# Patient Record
Sex: MALE | Race: OTHER | Employment: OTHER | URBAN - METROPOLITAN AREA
[De-identification: names, ages, dates, MRNs, and addresses within clinical notes are randomized per-mention and may not be internally consistent; named-entity substitution may affect disease eponyms.]

---

## 2021-08-05 ENCOUNTER — HOSPITAL ENCOUNTER (EMERGENCY)
Facility: HOSPITAL | Age: 66
Discharge: HOME | End: 2021-08-05
Attending: EMERGENCY MEDICINE
Payer: MEDICAID

## 2021-08-05 ENCOUNTER — APPOINTMENT (OUTPATIENT)
Dept: GENERAL RADIOLOGY | Facility: HOSPITAL | Age: 66
End: 2021-08-05
Attending: EMERGENCY MEDICINE
Payer: MEDICAID

## 2021-08-05 VITALS
DIASTOLIC BLOOD PRESSURE: 80 MMHG | RESPIRATION RATE: 18 BRPM | HEIGHT: 70 IN | HEART RATE: 67 BPM | OXYGEN SATURATION: 97 % | TEMPERATURE: 98 F | WEIGHT: 269 LBS | SYSTOLIC BLOOD PRESSURE: 143 MMHG | BODY MASS INDEX: 38.51 KG/M2

## 2021-08-05 DIAGNOSIS — R55 VASOVAGAL EPISODE: Primary | ICD-10-CM

## 2021-08-05 LAB
ALBUMIN SERPL-MCNC: 3.7 G/DL (ref 3.4–5)
ALBUMIN/GLOB SERPL: 0.8 {RATIO} (ref 1–2)
ALP LIVER SERPL-CCNC: 117 U/L
ALT SERPL-CCNC: 21 U/L
ANION GAP SERPL CALC-SCNC: 5 MMOL/L (ref 0–18)
AST SERPL-CCNC: 14 U/L (ref 15–37)
ATRIAL RATE: 73 BPM
BASOPHILS # BLD AUTO: 0.05 X10(3) UL (ref 0–0.2)
BASOPHILS NFR BLD AUTO: 0.7 %
BILIRUB SERPL-MCNC: 0.4 MG/DL (ref 0.1–2)
BUN BLD-MCNC: 19 MG/DL (ref 7–18)
CALCIUM BLD-MCNC: 9 MG/DL (ref 8.5–10.1)
CHLORIDE SERPL-SCNC: 104 MMOL/L (ref 98–112)
CO2 SERPL-SCNC: 29 MMOL/L (ref 21–32)
CREAT BLD-MCNC: 1.1 MG/DL
D-DIMER: 0.57 UG/ML FEU (ref ?–0.63)
EOSINOPHIL # BLD AUTO: 0.29 X10(3) UL (ref 0–0.7)
EOSINOPHIL NFR BLD AUTO: 4.2 %
ERYTHROCYTE [DISTWIDTH] IN BLOOD BY AUTOMATED COUNT: 11.9 %
GLOBULIN PLAS-MCNC: 4.4 G/DL (ref 2.8–4.4)
GLUCOSE BLD-MCNC: 161 MG/DL (ref 70–99)
HCT VFR BLD AUTO: 42 %
HGB BLD-MCNC: 13.6 G/DL
IMM GRANULOCYTES # BLD AUTO: 0.03 X10(3) UL (ref 0–1)
IMM GRANULOCYTES NFR BLD: 0.4 %
LYMPHOCYTES # BLD AUTO: 1.65 X10(3) UL (ref 1–4)
LYMPHOCYTES NFR BLD AUTO: 24 %
M PROTEIN MFR SERPL ELPH: 8.1 G/DL (ref 6.4–8.2)
MCH RBC QN AUTO: 26.7 PG (ref 26–34)
MCHC RBC AUTO-ENTMCNC: 32.4 G/DL (ref 31–37)
MCV RBC AUTO: 82.4 FL
MONOCYTES # BLD AUTO: 0.63 X10(3) UL (ref 0.1–1)
MONOCYTES NFR BLD AUTO: 9.2 %
NEUTROPHILS # BLD AUTO: 4.23 X10 (3) UL (ref 1.5–7.7)
NEUTROPHILS # BLD AUTO: 4.23 X10(3) UL (ref 1.5–7.7)
NEUTROPHILS NFR BLD AUTO: 61.5 %
NT-PROBNP SERPL-MCNC: 293 PG/ML (ref ?–125)
OSMOLALITY SERPL CALC.SUM OF ELEC: 292 MOSM/KG (ref 275–295)
PLATELET # BLD AUTO: 278 10(3)UL (ref 150–450)
POTASSIUM SERPL-SCNC: 4 MMOL/L (ref 3.5–5.1)
Q-T INTERVAL: 380 MS
QRS DURATION: 108 MS
QTC CALCULATION (BEZET): 454 MS
R AXIS: 13 DEGREES
RBC # BLD AUTO: 5.1 X10(6)UL
SARS-COV-2 RNA RESP QL NAA+PROBE: NOT DETECTED
SODIUM SERPL-SCNC: 138 MMOL/L (ref 136–145)
T AXIS: 44 DEGREES
TROPONIN I SERPL-MCNC: <0.045 NG/ML (ref ?–0.04)
VENTRICULAR RATE: 86 BPM
WBC # BLD AUTO: 6.9 X10(3) UL (ref 4–11)

## 2021-08-05 PROCEDURE — 85025 COMPLETE CBC W/AUTO DIFF WBC: CPT | Performed by: EMERGENCY MEDICINE

## 2021-08-05 PROCEDURE — 71045 X-RAY EXAM CHEST 1 VIEW: CPT | Performed by: EMERGENCY MEDICINE

## 2021-08-05 PROCEDURE — 93005 ELECTROCARDIOGRAM TRACING: CPT

## 2021-08-05 PROCEDURE — 84484 ASSAY OF TROPONIN QUANT: CPT | Performed by: EMERGENCY MEDICINE

## 2021-08-05 PROCEDURE — 83880 ASSAY OF NATRIURETIC PEPTIDE: CPT | Performed by: EMERGENCY MEDICINE

## 2021-08-05 PROCEDURE — 85379 FIBRIN DEGRADATION QUANT: CPT | Performed by: EMERGENCY MEDICINE

## 2021-08-05 PROCEDURE — 99284 EMERGENCY DEPT VISIT MOD MDM: CPT

## 2021-08-05 PROCEDURE — 80053 COMPREHEN METABOLIC PANEL: CPT | Performed by: EMERGENCY MEDICINE

## 2021-08-05 PROCEDURE — 99285 EMERGENCY DEPT VISIT HI MDM: CPT

## 2021-08-05 PROCEDURE — 93010 ELECTROCARDIOGRAM REPORT: CPT

## 2021-08-05 PROCEDURE — 36415 COLL VENOUS BLD VENIPUNCTURE: CPT

## 2021-08-05 RX ORDER — BICALUTAMIDE 50 MG/1
50 TABLET, FILM COATED ORAL 3 TIMES DAILY
COMMUNITY

## 2021-08-05 RX ORDER — CLOPIDOGREL BISULFATE 75 MG/1
75 TABLET ORAL DAILY
COMMUNITY

## 2021-08-05 NOTE — ED INITIAL ASSESSMENT (HPI)
Chills, nausea, diaphoretic today. Chest pain while in ambulance. Hx new onset afib dx 3 weeks ago, mitral stenosis, stage 4 prostate cancer dx 3 weeks ago.  Ileac stent placed in left leg 3 weeks ago

## 2021-08-05 NOTE — ED PROVIDER NOTES
Patient Seen in: [facility] Emergency Department      History   Patient presents with:  Chest Pain Angina    Stated Complaint: CP    HPI/Subjective:   HPI    74-year-old male brought in by family after an episode of sweating and chills today.   He wa 10\")   Wt 122 kg   SpO2 97%   BMI 38.60 kg/m²         Physical Exam    General:  Vitals as listed. No acute distress. Obese. HEENT: Sclerae anicteric. Conjunctivae show no pallor.   Oropharynx clear, mucous membranes moist   Neck: supple, no rigidity 10/09/2009, 8:38 AM.  INDICATIONS:  CP  PATIENT STATED HISTORY: (As transcribed by Technologist)   History of new onset afib diagnosed 3 weeks ago, mitral stenosis, stage 4 prostate cancer diagnosed 3 weeks ago.               CONCLUSION:  Mild cardiac enlar Disposition:  Discharge  8/5/2021  4:53 pm    Follow-up:  Natalie Andrews MD  [address] [place] [address]  [phone]    Schedule an appointment as soon as possible for a visit      See cardiology on Monday as scheduled

## 2023-01-01 ENCOUNTER — APPOINTMENT (OUTPATIENT)
Dept: GENERAL RADIOLOGY | Facility: HOSPITAL | Age: 68
End: 2023-01-01
Attending: INTERNAL MEDICINE
Payer: MEDICARE

## 2023-01-01 ENCOUNTER — APPOINTMENT (OUTPATIENT)
Dept: GENERAL RADIOLOGY | Facility: HOSPITAL | Age: 68
End: 2023-01-01
Attending: HOSPITALIST
Payer: MEDICARE

## 2023-01-01 ENCOUNTER — APPOINTMENT (OUTPATIENT)
Dept: CT IMAGING | Facility: HOSPITAL | Age: 68
End: 2023-01-01
Attending: INTERNAL MEDICINE
Payer: MEDICARE

## 2023-01-01 ENCOUNTER — APPOINTMENT (OUTPATIENT)
Dept: CT IMAGING | Facility: HOSPITAL | Age: 68
End: 2023-01-01
Attending: NURSE PRACTITIONER
Payer: MEDICARE

## 2023-01-01 ENCOUNTER — APPOINTMENT (OUTPATIENT)
Dept: INTERVENTIONAL RADIOLOGY/VASCULAR | Facility: HOSPITAL | Age: 68
End: 2023-01-01
Attending: INTERNAL MEDICINE
Payer: MEDICARE

## 2023-01-01 ENCOUNTER — HOSPITAL ENCOUNTER (INPATIENT)
Facility: HOSPITAL | Age: 68
LOS: 1 days | End: 2023-01-01
Attending: EMERGENCY MEDICINE | Admitting: HOSPITALIST
Payer: MEDICARE

## 2023-01-01 ENCOUNTER — APPOINTMENT (OUTPATIENT)
Dept: GENERAL RADIOLOGY | Facility: HOSPITAL | Age: 68
End: 2023-01-01
Attending: EMERGENCY MEDICINE
Payer: MEDICARE

## 2023-01-01 ENCOUNTER — APPOINTMENT (OUTPATIENT)
Dept: CT IMAGING | Facility: HOSPITAL | Age: 68
End: 2023-01-01
Attending: EMERGENCY MEDICINE
Payer: MEDICARE

## 2023-01-01 ENCOUNTER — HOSPITAL ENCOUNTER (INPATIENT)
Facility: HOSPITAL | Age: 68
LOS: 22 days | Discharge: NURSING, REHAB OR LONG-TERM CARE | End: 2023-01-01
Attending: EMERGENCY MEDICINE | Admitting: INTERNAL MEDICINE
Payer: MEDICARE

## 2023-01-01 ENCOUNTER — EXTERNAL FACILITY (OUTPATIENT)
Dept: FAMILY MEDICINE CLINIC | Facility: CLINIC | Age: 68
End: 2023-01-01

## 2023-01-01 VITALS
DIASTOLIC BLOOD PRESSURE: 44 MMHG | TEMPERATURE: 98 F | HEIGHT: 67 IN | WEIGHT: 206.81 LBS | BODY MASS INDEX: 32.46 KG/M2 | SYSTOLIC BLOOD PRESSURE: 79 MMHG

## 2023-01-01 VITALS
WEIGHT: 209.63 LBS | HEART RATE: 93 BPM | TEMPERATURE: 99 F | SYSTOLIC BLOOD PRESSURE: 105 MMHG | OXYGEN SATURATION: 100 % | RESPIRATION RATE: 24 BRPM | DIASTOLIC BLOOD PRESSURE: 60 MMHG | BODY MASS INDEX: 29 KG/M2

## 2023-01-01 DIAGNOSIS — C61 PROSTATE CARCINOMA (HCC): ICD-10-CM

## 2023-01-01 DIAGNOSIS — C79.9 METASTASIS FROM MALIGNANT TUMOR OF PROSTATE (HCC): Primary | ICD-10-CM

## 2023-01-01 DIAGNOSIS — C80.1 MENINGEAL CARCINOMATOSIS (HCC): Primary | ICD-10-CM

## 2023-01-01 DIAGNOSIS — D61.818 PANCYTOPENIA (HCC): ICD-10-CM

## 2023-01-01 DIAGNOSIS — D64.9 ANEMIA, UNSPECIFIED TYPE: ICD-10-CM

## 2023-01-01 DIAGNOSIS — C61 METASTASIS FROM MALIGNANT TUMOR OF PROSTATE (HCC): Primary | ICD-10-CM

## 2023-01-01 DIAGNOSIS — C79.49 MENINGEAL CARCINOMATOSIS (HCC): Primary | ICD-10-CM

## 2023-01-01 DIAGNOSIS — R06.02 SHORTNESS OF BREATH: ICD-10-CM

## 2023-01-01 DIAGNOSIS — R09.02 HYPOXIA: Primary | ICD-10-CM

## 2023-01-01 DIAGNOSIS — J69.0 ASPIRATION PNEUMONITIS (HCC): ICD-10-CM

## 2023-01-01 DIAGNOSIS — J98.11 ATELECTASIS: ICD-10-CM

## 2023-01-01 DIAGNOSIS — C79.31 MALIGNANT NEOPLASM METASTATIC TO BRAIN (HCC): ICD-10-CM

## 2023-01-01 LAB
ALBUMIN SERPL-MCNC: 2.1 G/DL (ref 3.4–5)
ALBUMIN SERPL-MCNC: 2.2 G/DL (ref 3.4–5)
ALBUMIN SERPL-MCNC: 2.2 G/DL (ref 3.4–5)
ALBUMIN SERPL-MCNC: 2.3 G/DL (ref 3.4–5)
ALBUMIN SERPL-MCNC: 2.5 G/DL (ref 3.4–5)
ALBUMIN/GLOB SERPL: 0.6 {RATIO} (ref 1–2)
ALBUMIN/GLOB SERPL: 0.7 {RATIO} (ref 1–2)
ALBUMIN/GLOB SERPL: 0.8 {RATIO} (ref 1–2)
ALP LIVER SERPL-CCNC: 331 U/L
ALP LIVER SERPL-CCNC: 378 U/L
ALP LIVER SERPL-CCNC: 405 U/L
ALP LIVER SERPL-CCNC: 413 U/L
ALP LIVER SERPL-CCNC: 475 U/L
ALT SERPL-CCNC: 25 U/L
ALT SERPL-CCNC: 33 U/L
ALT SERPL-CCNC: 35 U/L
ALT SERPL-CCNC: 36 U/L
ALT SERPL-CCNC: 39 U/L
ANION GAP SERPL CALC-SCNC: 2 MMOL/L (ref 0–18)
ANION GAP SERPL CALC-SCNC: 2 MMOL/L (ref 0–18)
ANION GAP SERPL CALC-SCNC: 4 MMOL/L (ref 0–18)
ANION GAP SERPL CALC-SCNC: 4 MMOL/L (ref 0–18)
ANION GAP SERPL CALC-SCNC: 5 MMOL/L (ref 0–18)
ANION GAP SERPL CALC-SCNC: 5 MMOL/L (ref 0–18)
ANION GAP SERPL CALC-SCNC: 6 MMOL/L (ref 0–18)
ANION GAP SERPL CALC-SCNC: 6 MMOL/L (ref 0–18)
ANION GAP SERPL CALC-SCNC: 8 MMOL/L (ref 0–18)
ANION GAP SERPL CALC-SCNC: 9 MMOL/L (ref 0–18)
ANION GAP SERPL CALC-SCNC: 9 MMOL/L (ref 0–18)
ANTIBODY SCREEN: NEGATIVE
ARTERIAL PATENCY WRIST A: POSITIVE
ARTERIAL PATENCY WRIST A: POSITIVE
AST SERPL-CCNC: 137 U/L (ref 15–37)
AST SERPL-CCNC: 40 U/L (ref 15–37)
AST SERPL-CCNC: 47 U/L (ref 15–37)
AST SERPL-CCNC: 56 U/L (ref 15–37)
AST SERPL-CCNC: 61 U/L (ref 15–37)
BASE EXCESS BLDA CALC-SCNC: 0.4 MMOL/L (ref ?–2)
BASE EXCESS BLDA CALC-SCNC: 1.1 MMOL/L (ref ?–2)
BASE EXCESS BLDA CALC-SCNC: 5 MMOL/L (ref ?–2)
BASE EXCESS BLDA CALC-SCNC: 7.4 MMOL/L (ref ?–2)
BASE EXCESS BLDV CALC-SCNC: 1.3 MMOL/L
BASOPHILS # BLD AUTO: 0.02 X10(3) UL (ref 0–0.2)
BASOPHILS # BLD: 0 X10(3) UL (ref 0–0.2)
BASOPHILS NFR BLD AUTO: 0.8 %
BASOPHILS NFR BLD AUTO: 1.1 %
BASOPHILS NFR BLD AUTO: 1.7 %
BASOPHILS NFR BLD: 0 %
BILIRUB SERPL-MCNC: 0.4 MG/DL (ref 0.1–2)
BILIRUB SERPL-MCNC: 0.5 MG/DL (ref 0.1–2)
BILIRUB SERPL-MCNC: 1 MG/DL (ref 0.1–2)
BILIRUB UR QL STRIP.AUTO: NEGATIVE
BLOOD TYPE BARCODE: 5100
BODY TEMPERATURE: 97.4 F
BODY TEMPERATURE: 98.6 F
BUN BLD-MCNC: 11 MG/DL (ref 7–18)
BUN BLD-MCNC: 12 MG/DL (ref 7–18)
BUN BLD-MCNC: 15 MG/DL (ref 7–18)
BUN BLD-MCNC: 17 MG/DL (ref 7–18)
BUN BLD-MCNC: 18 MG/DL (ref 7–18)
BUN BLD-MCNC: 19 MG/DL (ref 7–18)
BUN BLD-MCNC: 19 MG/DL (ref 7–18)
BUN BLD-MCNC: 20 MG/DL (ref 7–18)
BUN BLD-MCNC: 9 MG/DL (ref 7–18)
C DIFF TOX B STL QL: NEGATIVE
CA-I BLD-SCNC: 1.14 MMOL/L (ref 0.95–1.32)
CA-I BLD-SCNC: 1.19 MMOL/L (ref 0.95–1.32)
CA-I BLD-SCNC: 1.2 MMOL/L (ref 0.95–1.32)
CA-I BLD-SCNC: 1.24 MMOL/L (ref 0.95–1.32)
CALCIUM BLD-MCNC: 8.1 MG/DL (ref 8.5–10.1)
CALCIUM BLD-MCNC: 8.2 MG/DL (ref 8.5–10.1)
CALCIUM BLD-MCNC: 8.2 MG/DL (ref 8.5–10.1)
CALCIUM BLD-MCNC: 8.3 MG/DL (ref 8.5–10.1)
CALCIUM BLD-MCNC: 8.4 MG/DL (ref 8.5–10.1)
CALCIUM BLD-MCNC: 8.4 MG/DL (ref 8.5–10.1)
CALCIUM BLD-MCNC: 8.6 MG/DL (ref 8.5–10.1)
CALCIUM BLD-MCNC: 8.9 MG/DL (ref 8.5–10.1)
CHLORIDE SERPL-SCNC: 104 MMOL/L (ref 98–112)
CHLORIDE SERPL-SCNC: 107 MMOL/L (ref 98–112)
CHLORIDE SERPL-SCNC: 109 MMOL/L (ref 98–112)
CHLORIDE SERPL-SCNC: 113 MMOL/L (ref 98–112)
CHLORIDE SERPL-SCNC: 115 MMOL/L (ref 98–112)
CHLORIDE SERPL-SCNC: 116 MMOL/L (ref 98–112)
CHLORIDE SERPL-SCNC: 117 MMOL/L (ref 98–112)
CHLORIDE SERPL-SCNC: 99 MMOL/L (ref 98–112)
CHLORIDE UR-SCNC: 13 MMOL/L
CLARITY UR REFRACT.AUTO: CLEAR
CO2 SERPL-SCNC: 22 MMOL/L (ref 21–32)
CO2 SERPL-SCNC: 24 MMOL/L (ref 21–32)
CO2 SERPL-SCNC: 24 MMOL/L (ref 21–32)
CO2 SERPL-SCNC: 27 MMOL/L (ref 21–32)
CO2 SERPL-SCNC: 28 MMOL/L (ref 21–32)
CO2 SERPL-SCNC: 31 MMOL/L (ref 21–32)
COHGB MFR BLD: 1.4 % SAT (ref 0–3)
COHGB MFR BLD: 1.5 % SAT (ref 0–3)
COHGB MFR BLD: 1.6 % SAT (ref 0–3)
COHGB MFR BLD: 1.8 % SAT (ref 0–3)
COLOR UR AUTO: YELLOW
CREAT BLD-MCNC: 0.42 MG/DL
CREAT BLD-MCNC: 0.52 MG/DL
CREAT BLD-MCNC: 0.62 MG/DL
CREAT BLD-MCNC: 0.64 MG/DL
CREAT BLD-MCNC: 0.65 MG/DL
CREAT BLD-MCNC: 0.66 MG/DL
CREAT BLD-MCNC: 0.67 MG/DL
CREAT BLD-MCNC: 0.68 MG/DL
CREAT BLD-MCNC: 0.68 MG/DL
CREAT BLD-MCNC: 0.76 MG/DL
CREAT BLD-MCNC: 0.83 MG/DL
EGFRCR SERPLBLD CKD-EPI 2021: 100 ML/MIN/1.73M2 (ref 60–?)
EGFRCR SERPLBLD CKD-EPI 2021: 103 ML/MIN/1.73M2 (ref 60–?)
EGFRCR SERPLBLD CKD-EPI 2021: 103 ML/MIN/1.73M2 (ref 60–?)
EGFRCR SERPLBLD CKD-EPI 2021: 104 ML/MIN/1.73M2 (ref 60–?)
EGFRCR SERPLBLD CKD-EPI 2021: 105 ML/MIN/1.73M2 (ref 60–?)
EGFRCR SERPLBLD CKD-EPI 2021: 105 ML/MIN/1.73M2 (ref 60–?)
EGFRCR SERPLBLD CKD-EPI 2021: 106 ML/MIN/1.73M2 (ref 60–?)
EGFRCR SERPLBLD CKD-EPI 2021: 119 ML/MIN/1.73M2 (ref 60–?)
EGFRCR SERPLBLD CKD-EPI 2021: 97 ML/MIN/1.73M2 (ref 60–?)
EOSINOPHIL # BLD AUTO: 0.01 X10(3) UL (ref 0–0.7)
EOSINOPHIL # BLD AUTO: 0.01 X10(3) UL (ref 0–0.7)
EOSINOPHIL # BLD AUTO: 0.04 X10(3) UL (ref 0–0.7)
EOSINOPHIL # BLD: 0 X10(3) UL (ref 0–0.7)
EOSINOPHIL # BLD: 0.03 X10(3) UL (ref 0–0.7)
EOSINOPHIL # BLD: 0.04 X10(3) UL (ref 0–0.7)
EOSINOPHIL NFR BLD AUTO: 0.5 %
EOSINOPHIL NFR BLD AUTO: 0.8 %
EOSINOPHIL NFR BLD AUTO: 1.7 %
EOSINOPHIL NFR BLD: 0 %
EOSINOPHIL NFR BLD: 1 %
EOSINOPHIL NFR BLD: 1 %
ERYTHROCYTE [DISTWIDTH] IN BLOOD BY AUTOMATED COUNT: 15.6 %
ERYTHROCYTE [DISTWIDTH] IN BLOOD BY AUTOMATED COUNT: 15.9 %
ERYTHROCYTE [DISTWIDTH] IN BLOOD BY AUTOMATED COUNT: 16.1 %
ERYTHROCYTE [DISTWIDTH] IN BLOOD BY AUTOMATED COUNT: 16.2 %
ERYTHROCYTE [DISTWIDTH] IN BLOOD BY AUTOMATED COUNT: 16.3 %
ERYTHROCYTE [DISTWIDTH] IN BLOOD BY AUTOMATED COUNT: 16.4 %
ERYTHROCYTE [DISTWIDTH] IN BLOOD BY AUTOMATED COUNT: 16.5 %
ERYTHROCYTE [DISTWIDTH] IN BLOOD BY AUTOMATED COUNT: 16.6 %
ERYTHROCYTE [DISTWIDTH] IN BLOOD BY AUTOMATED COUNT: 16.7 %
ERYTHROCYTE [DISTWIDTH] IN BLOOD BY AUTOMATED COUNT: 16.8 %
EXPIRATORY PRESSURE: 5 CM H2O
FIO2: 100 %
FLUAV + FLUBV RNA SPEC NAA+PROBE: NEGATIVE
FLUAV + FLUBV RNA SPEC NAA+PROBE: NEGATIVE
GFR SERPLBLD BASED ON 1.73 SQ M-ARVRAT: 104 ML/MIN/1.73M2 (ref 60–?)
GFR SERPLBLD BASED ON 1.73 SQ M-ARVRAT: 112 ML/MIN/1.73M2 (ref 60–?)
GLOBULIN PLAS-MCNC: 3 G/DL (ref 2.8–4.4)
GLOBULIN PLAS-MCNC: 3 G/DL (ref 2.8–4.4)
GLOBULIN PLAS-MCNC: 3.1 G/DL (ref 2.8–4.4)
GLOBULIN PLAS-MCNC: 3.2 G/DL (ref 2.8–4.4)
GLOBULIN PLAS-MCNC: 4.2 G/DL (ref 2.8–4.4)
GLUCOSE BLD-MCNC: 102 MG/DL (ref 70–99)
GLUCOSE BLD-MCNC: 105 MG/DL (ref 70–99)
GLUCOSE BLD-MCNC: 105 MG/DL (ref 70–99)
GLUCOSE BLD-MCNC: 107 MG/DL (ref 70–99)
GLUCOSE BLD-MCNC: 108 MG/DL (ref 70–99)
GLUCOSE BLD-MCNC: 116 MG/DL (ref 70–99)
GLUCOSE BLD-MCNC: 116 MG/DL (ref 70–99)
GLUCOSE BLD-MCNC: 117 MG/DL (ref 70–99)
GLUCOSE BLD-MCNC: 119 MG/DL (ref 70–99)
GLUCOSE BLD-MCNC: 120 MG/DL (ref 70–99)
GLUCOSE BLD-MCNC: 121 MG/DL (ref 70–99)
GLUCOSE BLD-MCNC: 123 MG/DL (ref 70–99)
GLUCOSE BLD-MCNC: 124 MG/DL (ref 70–99)
GLUCOSE BLD-MCNC: 124 MG/DL (ref 70–99)
GLUCOSE BLD-MCNC: 125 MG/DL (ref 70–99)
GLUCOSE BLD-MCNC: 128 MG/DL (ref 70–99)
GLUCOSE BLD-MCNC: 128 MG/DL (ref 70–99)
GLUCOSE BLD-MCNC: 129 MG/DL (ref 70–99)
GLUCOSE BLD-MCNC: 129 MG/DL (ref 70–99)
GLUCOSE BLD-MCNC: 130 MG/DL (ref 70–99)
GLUCOSE BLD-MCNC: 131 MG/DL (ref 70–99)
GLUCOSE BLD-MCNC: 135 MG/DL (ref 70–99)
GLUCOSE BLD-MCNC: 136 MG/DL (ref 70–99)
GLUCOSE BLD-MCNC: 136 MG/DL (ref 70–99)
GLUCOSE BLD-MCNC: 137 MG/DL (ref 70–99)
GLUCOSE BLD-MCNC: 138 MG/DL (ref 70–99)
GLUCOSE BLD-MCNC: 139 MG/DL (ref 70–99)
GLUCOSE BLD-MCNC: 140 MG/DL (ref 70–99)
GLUCOSE BLD-MCNC: 140 MG/DL (ref 70–99)
GLUCOSE BLD-MCNC: 141 MG/DL (ref 70–99)
GLUCOSE BLD-MCNC: 141 MG/DL (ref 70–99)
GLUCOSE BLD-MCNC: 142 MG/DL (ref 70–99)
GLUCOSE BLD-MCNC: 145 MG/DL (ref 70–99)
GLUCOSE BLD-MCNC: 146 MG/DL (ref 70–99)
GLUCOSE BLD-MCNC: 147 MG/DL (ref 70–99)
GLUCOSE BLD-MCNC: 147 MG/DL (ref 70–99)
GLUCOSE BLD-MCNC: 154 MG/DL (ref 70–99)
GLUCOSE BLD-MCNC: 157 MG/DL (ref 70–99)
GLUCOSE BLD-MCNC: 158 MG/DL (ref 70–99)
GLUCOSE BLD-MCNC: 159 MG/DL (ref 70–99)
GLUCOSE BLD-MCNC: 162 MG/DL (ref 70–99)
GLUCOSE BLD-MCNC: 166 MG/DL (ref 70–99)
GLUCOSE BLD-MCNC: 168 MG/DL (ref 70–99)
GLUCOSE BLD-MCNC: 170 MG/DL (ref 70–99)
GLUCOSE BLD-MCNC: 172 MG/DL (ref 70–99)
GLUCOSE BLD-MCNC: 179 MG/DL (ref 70–99)
GLUCOSE BLD-MCNC: 182 MG/DL (ref 70–99)
GLUCOSE BLD-MCNC: 185 MG/DL (ref 70–99)
GLUCOSE BLD-MCNC: 189 MG/DL (ref 70–99)
GLUCOSE BLD-MCNC: 189 MG/DL (ref 70–99)
GLUCOSE BLD-MCNC: 190 MG/DL (ref 70–99)
GLUCOSE BLD-MCNC: 193 MG/DL (ref 70–99)
GLUCOSE BLD-MCNC: 195 MG/DL (ref 70–99)
GLUCOSE BLD-MCNC: 196 MG/DL (ref 70–99)
GLUCOSE BLD-MCNC: 197 MG/DL (ref 70–99)
GLUCOSE BLD-MCNC: 197 MG/DL (ref 70–99)
GLUCOSE BLD-MCNC: 199 MG/DL (ref 70–99)
GLUCOSE BLD-MCNC: 204 MG/DL (ref 70–99)
GLUCOSE BLD-MCNC: 205 MG/DL (ref 70–99)
GLUCOSE BLD-MCNC: 205 MG/DL (ref 70–99)
GLUCOSE BLD-MCNC: 206 MG/DL (ref 70–99)
GLUCOSE BLD-MCNC: 210 MG/DL (ref 70–99)
GLUCOSE BLD-MCNC: 212 MG/DL (ref 70–99)
GLUCOSE BLD-MCNC: 216 MG/DL (ref 70–99)
GLUCOSE BLD-MCNC: 217 MG/DL (ref 70–99)
GLUCOSE BLD-MCNC: 221 MG/DL (ref 70–99)
GLUCOSE BLD-MCNC: 223 MG/DL (ref 70–99)
GLUCOSE BLD-MCNC: 224 MG/DL (ref 70–99)
GLUCOSE BLD-MCNC: 225 MG/DL (ref 70–99)
GLUCOSE BLD-MCNC: 226 MG/DL (ref 70–99)
GLUCOSE BLD-MCNC: 230 MG/DL (ref 70–99)
GLUCOSE BLD-MCNC: 233 MG/DL (ref 70–99)
GLUCOSE BLD-MCNC: 235 MG/DL (ref 70–99)
GLUCOSE BLD-MCNC: 236 MG/DL (ref 70–99)
GLUCOSE BLD-MCNC: 238 MG/DL (ref 70–99)
GLUCOSE BLD-MCNC: 239 MG/DL (ref 70–99)
GLUCOSE BLD-MCNC: 239 MG/DL (ref 70–99)
GLUCOSE BLD-MCNC: 241 MG/DL (ref 70–99)
GLUCOSE BLD-MCNC: 241 MG/DL (ref 70–99)
GLUCOSE BLD-MCNC: 243 MG/DL (ref 70–99)
GLUCOSE BLD-MCNC: 245 MG/DL (ref 70–99)
GLUCOSE BLD-MCNC: 246 MG/DL (ref 70–99)
GLUCOSE BLD-MCNC: 248 MG/DL (ref 70–99)
GLUCOSE BLD-MCNC: 249 MG/DL (ref 70–99)
GLUCOSE BLD-MCNC: 253 MG/DL (ref 70–99)
GLUCOSE BLD-MCNC: 255 MG/DL (ref 70–99)
GLUCOSE BLD-MCNC: 261 MG/DL (ref 70–99)
GLUCOSE BLD-MCNC: 266 MG/DL (ref 70–99)
GLUCOSE BLD-MCNC: 270 MG/DL (ref 70–99)
GLUCOSE BLD-MCNC: 282 MG/DL (ref 70–99)
GLUCOSE BLD-MCNC: 285 MG/DL (ref 70–99)
GLUCOSE BLD-MCNC: 286 MG/DL (ref 70–99)
GLUCOSE BLD-MCNC: 318 MG/DL (ref 70–99)
GLUCOSE UR STRIP.AUTO-MCNC: NEGATIVE MG/DL
HCO3 BLDA-SCNC: 25.3 MEQ/L (ref 21–27)
HCO3 BLDA-SCNC: 25.7 MEQ/L (ref 21–27)
HCO3 BLDA-SCNC: 28.8 MEQ/L (ref 21–27)
HCO3 BLDA-SCNC: 30.6 MEQ/L (ref 21–27)
HCO3 BLDV-SCNC: 24.6 MEQ/L (ref 22–26)
HCT VFR BLD AUTO: 18.6 %
HCT VFR BLD AUTO: 20.4 %
HCT VFR BLD AUTO: 22.4 %
HCT VFR BLD AUTO: 22.6 %
HCT VFR BLD AUTO: 22.6 %
HCT VFR BLD AUTO: 23.3 %
HCT VFR BLD AUTO: 23.4 %
HCT VFR BLD AUTO: 23.5 %
HCT VFR BLD AUTO: 23.8 %
HCT VFR BLD AUTO: 24.8 %
HCT VFR BLD AUTO: 25.2 %
HCT VFR BLD AUTO: 25.9 %
HCT VFR BLD AUTO: 27.1 %
HCT VFR BLD AUTO: 28.1 %
HCT VFR BLD AUTO: 29.3 %
HCT VFR BLD AUTO: 29.6 %
HGB BLD-MCNC: 5.8 G/DL
HGB BLD-MCNC: 6.2 G/DL
HGB BLD-MCNC: 6.6 G/DL
HGB BLD-MCNC: 7 G/DL
HGB BLD-MCNC: 7.1 G/DL
HGB BLD-MCNC: 7.2 G/DL
HGB BLD-MCNC: 7.3 G/DL
HGB BLD-MCNC: 7.3 G/DL
HGB BLD-MCNC: 7.4 G/DL
HGB BLD-MCNC: 7.5 G/DL
HGB BLD-MCNC: 7.6 G/DL
HGB BLD-MCNC: 7.7 G/DL
HGB BLD-MCNC: 7.8 G/DL
HGB BLD-MCNC: 7.8 G/DL
HGB BLD-MCNC: 8 G/DL
HGB BLD-MCNC: 8.1 G/DL
HGB BLD-MCNC: 8.4 G/DL
HGB BLD-MCNC: 9 G/DL
HGB BLD-MCNC: 9.7 G/DL
IMM GRANULOCYTES # BLD AUTO: 0.05 X10(3) UL (ref 0–1)
IMM GRANULOCYTES # BLD AUTO: 0.05 X10(3) UL (ref 0–1)
IMM GRANULOCYTES # BLD AUTO: 0.09 X10(3) UL (ref 0–1)
IMM GRANULOCYTES NFR BLD: 2.7 %
IMM GRANULOCYTES NFR BLD: 3.7 %
IMM GRANULOCYTES NFR BLD: 4.2 %
INR BLD: 1.13 (ref 0.85–1.16)
INSP PRESSURE: 12 CM H2O
KETONES UR STRIP.AUTO-MCNC: NEGATIVE MG/DL
L/M: 4 L/MIN
L/M: 6 L/MIN
LACTATE BLD-SCNC: 1.5 MMOL/L (ref 0.5–2)
LACTATE BLD-SCNC: 1.6 MMOL/L (ref 0.5–2)
LACTATE BLD-SCNC: 2.6 MMOL/L (ref 0.5–2)
LACTATE BLD-SCNC: 2.7 MMOL/L (ref 0.5–2)
LACTATE SERPL-SCNC: 1.2 MMOL/L (ref 0.4–2)
LACTATE SERPL-SCNC: 2 MMOL/L (ref 0.4–2)
LACTATE SERPL-SCNC: 2 MMOL/L (ref 0.4–2)
LACTATE SERPL-SCNC: 2.6 MMOL/L (ref 0.4–2)
LACTATE SERPL-SCNC: 2.7 MMOL/L (ref 0.4–2)
LACTATE SERPL-SCNC: 3.6 MMOL/L (ref 0.4–2)
LACTATE SERPL-SCNC: 3.8 MMOL/L (ref 0.4–2)
LDH SERPL L TO P-CCNC: 1574 U/L
LDH SERPL L TO P-CCNC: >4000 U/L
LEUKOCYTE ESTERASE UR QL STRIP.AUTO: NEGATIVE
LIPASE SERPL-CCNC: 23 U/L (ref 13–75)
LYMPHOCYTES # BLD AUTO: 0.31 X10(3) UL (ref 1–4)
LYMPHOCYTES # BLD AUTO: 0.59 X10(3) UL (ref 1–4)
LYMPHOCYTES # BLD AUTO: 0.8 X10(3) UL (ref 1–4)
LYMPHOCYTES NFR BLD AUTO: 26.3 %
LYMPHOCYTES NFR BLD AUTO: 31.9 %
LYMPHOCYTES NFR BLD AUTO: 33.1 %
LYMPHOCYTES NFR BLD: 0.26 X10(3) UL (ref 1–4)
LYMPHOCYTES NFR BLD: 0.27 X10(3) UL (ref 1–4)
LYMPHOCYTES NFR BLD: 0.28 X10(3) UL (ref 1–4)
LYMPHOCYTES NFR BLD: 0.45 X10(3) UL (ref 1–4)
LYMPHOCYTES NFR BLD: 0.48 X10(3) UL (ref 1–4)
LYMPHOCYTES NFR BLD: 0.54 X10(3) UL (ref 1–4)
LYMPHOCYTES NFR BLD: 0.6 X10(3) UL (ref 1–4)
LYMPHOCYTES NFR BLD: 14 %
LYMPHOCYTES NFR BLD: 14 %
LYMPHOCYTES NFR BLD: 15 %
LYMPHOCYTES NFR BLD: 15 %
LYMPHOCYTES NFR BLD: 16 %
LYMPHOCYTES NFR BLD: 17 %
LYMPHOCYTES NFR BLD: 20 %
MAGNESIUM SERPL-MCNC: 1.9 MG/DL (ref 1.6–2.6)
MAGNESIUM SERPL-MCNC: 2 MG/DL (ref 1.6–2.6)
MAGNESIUM SERPL-MCNC: 2.3 MG/DL (ref 1.6–2.6)
MAGNESIUM SERPL-MCNC: 2.4 MG/DL (ref 1.6–2.6)
MCH RBC QN AUTO: 25.5 PG (ref 26–34)
MCH RBC QN AUTO: 25.6 PG (ref 26–34)
MCH RBC QN AUTO: 25.8 PG (ref 26–34)
MCH RBC QN AUTO: 26 PG (ref 26–34)
MCH RBC QN AUTO: 26 PG (ref 26–34)
MCH RBC QN AUTO: 26.1 PG (ref 26–34)
MCH RBC QN AUTO: 26.1 PG (ref 26–34)
MCH RBC QN AUTO: 26.5 PG (ref 26–34)
MCH RBC QN AUTO: 26.7 PG (ref 26–34)
MCH RBC QN AUTO: 26.8 PG (ref 26–34)
MCH RBC QN AUTO: 26.9 PG (ref 26–34)
MCH RBC QN AUTO: 27.1 PG (ref 26–34)
MCH RBC QN AUTO: 27.2 PG (ref 26–34)
MCH RBC QN AUTO: 27.3 PG (ref 26–34)
MCH RBC QN AUTO: 27.4 PG (ref 26–34)
MCH RBC QN AUTO: 27.5 PG (ref 26–34)
MCHC RBC AUTO-ENTMCNC: 28.2 G/DL (ref 31–37)
MCHC RBC AUTO-ENTMCNC: 28.4 G/DL (ref 31–37)
MCHC RBC AUTO-ENTMCNC: 28.8 G/DL (ref 31–37)
MCHC RBC AUTO-ENTMCNC: 29.5 G/DL (ref 31–37)
MCHC RBC AUTO-ENTMCNC: 29.8 G/DL (ref 31–37)
MCHC RBC AUTO-ENTMCNC: 30.1 G/DL (ref 31–37)
MCHC RBC AUTO-ENTMCNC: 30.4 G/DL (ref 31–37)
MCHC RBC AUTO-ENTMCNC: 30.5 G/DL (ref 31–37)
MCHC RBC AUTO-ENTMCNC: 30.7 G/DL (ref 31–37)
MCHC RBC AUTO-ENTMCNC: 31.1 G/DL (ref 31–37)
MCHC RBC AUTO-ENTMCNC: 31.1 G/DL (ref 31–37)
MCHC RBC AUTO-ENTMCNC: 31.2 G/DL (ref 31–37)
MCHC RBC AUTO-ENTMCNC: 31.3 G/DL (ref 31–37)
MCHC RBC AUTO-ENTMCNC: 31.4 G/DL (ref 31–37)
MCHC RBC AUTO-ENTMCNC: 31.5 G/DL (ref 31–37)
MCHC RBC AUTO-ENTMCNC: 31.9 G/DL (ref 31–37)
MCV RBC AUTO: 83.6 FL
MCV RBC AUTO: 84 FL
MCV RBC AUTO: 85.3 FL
MCV RBC AUTO: 85.3 FL
MCV RBC AUTO: 85.7 FL
MCV RBC AUTO: 86.1 FL
MCV RBC AUTO: 87.2 FL
MCV RBC AUTO: 87.3 FL
MCV RBC AUTO: 87.8 FL
MCV RBC AUTO: 87.8 FL
MCV RBC AUTO: 88.3 FL
MCV RBC AUTO: 88.9 FL
MCV RBC AUTO: 89.3 FL
MCV RBC AUTO: 90 FL
MCV RBC AUTO: 90.3 FL
MCV RBC AUTO: 91.9 FL
METAMYELOCYTES # BLD: 0.02 X10(3) UL
METAMYELOCYTES # BLD: 0.03 X10(3) UL
METAMYELOCYTES # BLD: 0.07 X10(3) UL
METAMYELOCYTES # BLD: 0.08 X10(3) UL
METAMYELOCYTES NFR BLD: 1 %
METAMYELOCYTES NFR BLD: 1 %
METAMYELOCYTES NFR BLD: 2 %
METAMYELOCYTES NFR BLD: 2 %
METHGB MFR BLD: 0 % SAT (ref 0.4–1.5)
METHGB MFR BLD: 0.4 % SAT (ref 0.4–1.5)
METHGB MFR BLD: 0.7 % SAT (ref 0.4–1.5)
METHGB MFR BLD: 0.7 % SAT (ref 0.4–1.5)
MONOCYTES # BLD AUTO: 0.12 X10(3) UL (ref 0.1–1)
MONOCYTES # BLD AUTO: 0.14 X10(3) UL (ref 0.1–1)
MONOCYTES # BLD AUTO: 0.14 X10(3) UL (ref 0.1–1)
MONOCYTES # BLD: 0.03 X10(3) UL (ref 0.1–1)
MONOCYTES # BLD: 0.08 X10(3) UL (ref 0.1–1)
MONOCYTES # BLD: 0.1 X10(3) UL (ref 0.1–1)
MONOCYTES # BLD: 0.13 X10(3) UL (ref 0.1–1)
MONOCYTES # BLD: 0.2 X10(3) UL (ref 0.1–1)
MONOCYTES # BLD: 0.28 X10(3) UL (ref 0.1–1)
MONOCYTES # BLD: 0.32 X10(3) UL (ref 0.1–1)
MONOCYTES NFR BLD AUTO: 11.9 %
MONOCYTES NFR BLD AUTO: 5.8 %
MONOCYTES NFR BLD AUTO: 6.5 %
MONOCYTES NFR BLD: 1 %
MONOCYTES NFR BLD: 20 %
MONOCYTES NFR BLD: 3 %
MONOCYTES NFR BLD: 5 %
MONOCYTES NFR BLD: 5 %
MONOCYTES NFR BLD: 8 %
MONOCYTES NFR BLD: 9 %
MORPHOLOGY: NORMAL
MORPHOLOGY: NORMAL
MYELOCYTES # BLD: 0.08 X10(3) UL
MYELOCYTES NFR BLD: 2 %
NEUTROPHILS # BLD AUTO: 0.65 X10 (3) UL (ref 1.5–7.7)
NEUTROPHILS # BLD AUTO: 0.65 X10(3) UL (ref 1.5–7.7)
NEUTROPHILS # BLD AUTO: 0.86 X10 (3) UL (ref 1.5–7.7)
NEUTROPHILS # BLD AUTO: 1.06 X10 (3) UL (ref 1.5–7.7)
NEUTROPHILS # BLD AUTO: 1.06 X10 (3) UL (ref 1.5–7.7)
NEUTROPHILS # BLD AUTO: 1.06 X10(3) UL (ref 1.5–7.7)
NEUTROPHILS # BLD AUTO: 1.1 X10 (3) UL (ref 1.5–7.7)
NEUTROPHILS # BLD AUTO: 1.33 X10 (3) UL (ref 1.5–7.7)
NEUTROPHILS # BLD AUTO: 1.33 X10(3) UL (ref 1.5–7.7)
NEUTROPHILS # BLD AUTO: 1.63 X10 (3) UL (ref 1.5–7.7)
NEUTROPHILS # BLD AUTO: 2.04 X10 (3) UL (ref 1.5–7.7)
NEUTROPHILS # BLD AUTO: 2.25 X10 (3) UL (ref 1.5–7.7)
NEUTROPHILS # BLD AUTO: 2.76 X10 (3) UL (ref 1.5–7.7)
NEUTROPHILS NFR BLD AUTO: 54.9 %
NEUTROPHILS NFR BLD AUTO: 55.1 %
NEUTROPHILS NFR BLD AUTO: 57.3 %
NEUTROPHILS NFR BLD: 60 %
NEUTROPHILS NFR BLD: 65 %
NEUTROPHILS NFR BLD: 69 %
NEUTROPHILS NFR BLD: 73 %
NEUTROPHILS NFR BLD: 75 %
NEUTROPHILS NFR BLD: 76 %
NEUTROPHILS NFR BLD: 79 %
NEUTS BAND NFR BLD: 1 %
NEUTS BAND NFR BLD: 10 %
NEUTS BAND NFR BLD: 5 %
NEUTS BAND NFR BLD: 6 %
NEUTS BAND NFR BLD: 9 %
NEUTS HYPERSEG # BLD: 0.84 X10(3) UL (ref 1.5–7.7)
NEUTS HYPERSEG # BLD: 1.22 X10(3) UL (ref 1.5–7.7)
NEUTS HYPERSEG # BLD: 1.52 X10(3) UL (ref 1.5–7.7)
NEUTS HYPERSEG # BLD: 2.18 X10(3) UL (ref 1.5–7.7)
NEUTS HYPERSEG # BLD: 2.66 X10(3) UL (ref 1.5–7.7)
NEUTS HYPERSEG # BLD: 2.72 X10(3) UL (ref 1.5–7.7)
NEUTS HYPERSEG # BLD: 3 X10(3) UL (ref 1.5–7.7)
NITRITE UR QL STRIP.AUTO: NEGATIVE
NRBC BLD MANUAL-RTO: 1 %
NRBC BLD MANUAL-RTO: 10 %
NRBC BLD MANUAL-RTO: 3 %
NRBC BLD MANUAL-RTO: 4 %
NRBC BLD MANUAL-RTO: 5 %
NT-PROBNP SERPL-MCNC: 2267 PG/ML (ref ?–125)
OSMOLALITY SERPL CALC.SUM OF ELEC: 274 MOSM/KG (ref 275–295)
OSMOLALITY SERPL CALC.SUM OF ELEC: 280 MOSM/KG (ref 275–295)
OSMOLALITY SERPL CALC.SUM OF ELEC: 293 MOSM/KG (ref 275–295)
OSMOLALITY SERPL CALC.SUM OF ELEC: 300 MOSM/KG (ref 275–295)
OSMOLALITY SERPL CALC.SUM OF ELEC: 309 MOSM/KG (ref 275–295)
OSMOLALITY SERPL CALC.SUM OF ELEC: 311 MOSM/KG (ref 275–295)
OSMOLALITY SERPL CALC.SUM OF ELEC: 312 MOSM/KG (ref 275–295)
OSMOLALITY SERPL CALC.SUM OF ELEC: 313 MOSM/KG (ref 275–295)
OSMOLALITY SERPL CALC.SUM OF ELEC: 314 MOSM/KG (ref 275–295)
OSMOLALITY SERPL CALC.SUM OF ELEC: 320 MOSM/KG (ref 275–295)
OSMOLALITY SERPL CALC.SUM OF ELEC: 323 MOSM/KG (ref 275–295)
OXYHGB MFR BLDA: 90.7 % (ref 92–100)
OXYHGB MFR BLDA: 92.4 % (ref 92–100)
OXYHGB MFR BLDA: 96.1 % (ref 92–100)
OXYHGB MFR BLDA: 98.2 % (ref 92–100)
OXYHGB MFR BLDV: 47 % (ref 72–78)
PCO2 BLDA: 33 MM HG (ref 35–45)
PCO2 BLDA: 40 MM HG (ref 35–45)
PCO2 BLDA: 40 MM HG (ref 35–45)
PCO2 BLDA: 55 MM HG (ref 35–45)
PCO2 BLDV: 40 MM HG (ref 38–50)
PEEP: 5 CM H2O
PH BLDA: 7.3 [PH] (ref 7.35–7.45)
PH BLDA: 7.47 [PH] (ref 7.35–7.45)
PH BLDA: 7.48 [PH] (ref 7.35–7.45)
PH BLDA: 7.5 [PH] (ref 7.35–7.45)
PH BLDV: 7.42 [PH] (ref 7.33–7.43)
PH UR STRIP.AUTO: 5 [PH] (ref 5–8)
PHOSPHATE SERPL-MCNC: 1.7 MG/DL (ref 2.5–4.9)
PHOSPHATE SERPL-MCNC: 2.4 MG/DL (ref 2.5–4.9)
PHOSPHATE SERPL-MCNC: 2.4 MG/DL (ref 2.5–4.9)
PHOSPHATE SERPL-MCNC: 2.7 MG/DL (ref 2.5–4.9)
PHOSPHATE SERPL-MCNC: 2.7 MG/DL (ref 2.5–4.9)
PHOSPHATE SERPL-MCNC: 3.2 MG/DL (ref 2.5–4.9)
PLATELET # BLD AUTO: 18 10(3)UL (ref 150–450)
PLATELET # BLD AUTO: 20 10(3)UL (ref 150–450)
PLATELET # BLD AUTO: 21 10(3)UL (ref 150–450)
PLATELET # BLD AUTO: 22 10(3)UL (ref 150–450)
PLATELET # BLD AUTO: 23 10(3)UL (ref 150–450)
PLATELET # BLD AUTO: 24 10(3)UL (ref 150–450)
PLATELET # BLD AUTO: 25 10(3)UL (ref 150–450)
PLATELET # BLD AUTO: 26 10(3)UL (ref 150–450)
PLATELET # BLD AUTO: 28 10(3)UL (ref 150–450)
PLATELET # BLD AUTO: 30 10(3)UL (ref 150–450)
PLATELET # BLD AUTO: 33 10(3)UL (ref 150–450)
PLATELET # BLD AUTO: 39 10(3)UL (ref 150–450)
PLATELET # BLD AUTO: 49 10(3)UL (ref 150–450)
PLATELET # BLD AUTO: 49 10(3)UL (ref 150–450)
PLATELET # BLD AUTO: 52 10(3)UL (ref 150–450)
PLATELET # BLD AUTO: 56 10(3)UL (ref 150–450)
PLATELET # BLD AUTO: 86 10(3)UL (ref 150–450)
PLATELET # BLD AUTO: 96 10(3)UL (ref 150–450)
PLATELET MORPHOLOGY: NORMAL
PO2 BLDA: 249 MM HG (ref 80–100)
PO2 BLDA: 62 MM HG (ref 80–100)
PO2 BLDA: 68 MM HG (ref 80–100)
PO2 BLDA: 79 MM HG (ref 80–100)
PO2 BLDV: 29 MM HG (ref 30–50)
POTASSIUM BLD-SCNC: 3.3 MMOL/L (ref 3.6–5.1)
POTASSIUM BLD-SCNC: 3.8 MMOL/L (ref 3.6–5.1)
POTASSIUM BLD-SCNC: 4.3 MMOL/L (ref 3.6–5.1)
POTASSIUM BLD-SCNC: 4.8 MMOL/L (ref 3.6–5.1)
POTASSIUM SERPL-SCNC: 3.1 MMOL/L (ref 3.5–5.1)
POTASSIUM SERPL-SCNC: 3.4 MMOL/L (ref 3.5–5.1)
POTASSIUM SERPL-SCNC: 3.6 MMOL/L (ref 3.5–5.1)
POTASSIUM SERPL-SCNC: 3.8 MMOL/L (ref 3.5–5.1)
POTASSIUM SERPL-SCNC: 3.8 MMOL/L (ref 3.5–5.1)
POTASSIUM SERPL-SCNC: 3.9 MMOL/L (ref 3.5–5.1)
POTASSIUM SERPL-SCNC: 4 MMOL/L (ref 3.5–5.1)
POTASSIUM SERPL-SCNC: 4.1 MMOL/L (ref 3.5–5.1)
POTASSIUM SERPL-SCNC: 4.2 MMOL/L (ref 3.5–5.1)
POTASSIUM SERPL-SCNC: 4.3 MMOL/L (ref 3.5–5.1)
POTASSIUM SERPL-SCNC: 4.4 MMOL/L (ref 3.5–5.1)
PROT SERPL-MCNC: 5.1 G/DL (ref 6.4–8.2)
PROT SERPL-MCNC: 5.3 G/DL (ref 6.4–8.2)
PROT SERPL-MCNC: 5.3 G/DL (ref 6.4–8.2)
PROT SERPL-MCNC: 5.4 G/DL (ref 6.4–8.2)
PROT SERPL-MCNC: 6.7 G/DL (ref 6.4–8.2)
PROT UR STRIP.AUTO-MCNC: NEGATIVE MG/DL
PROTHROMBIN TIME: 14.5 SECONDS (ref 11.6–14.8)
PSA SERPL-MCNC: 299 NG/ML (ref ?–4)
Q-T INTERVAL: 316 MS
QRS DURATION: 88 MS
QTC CALCULATION (BEZET): 474 MS
R AXIS: -18 DEGREES
RBC # BLD AUTO: 2.17 X10(6)UL
RBC # BLD AUTO: 2.34 X10(6)UL
RBC # BLD AUTO: 2.55 X10(6)UL
RBC # BLD AUTO: 2.59 X10(6)UL
RBC # BLD AUTO: 2.59 X10(6)UL
RBC # BLD AUTO: 2.65 X10(6)UL
RBC # BLD AUTO: 2.71 X10(6)UL
RBC # BLD AUTO: 2.73 X10(6)UL
RBC # BLD AUTO: 2.8 X10(6)UL
RBC # BLD AUTO: 2.81 X10(6)UL
RBC # BLD AUTO: 2.88 X10(6)UL
RBC # BLD AUTO: 2.9 X10(6)UL
RBC # BLD AUTO: 3.07 X10(6)UL
RBC # BLD AUTO: 3.16 X10(6)UL
RBC # BLD AUTO: 3.22 X10(6)UL
RBC # BLD AUTO: 3.49 X10(6)UL
RBC UR QL AUTO: NEGATIVE
RH BLOOD TYPE: POSITIVE
RSV RNA SPEC NAA+PROBE: NEGATIVE
SARS-COV-2 RNA RESP QL NAA+PROBE: NOT DETECTED
SODIUM BLD-SCNC: 135 MMOL/L (ref 135–145)
SODIUM BLD-SCNC: 139 MMOL/L (ref 135–145)
SODIUM BLD-SCNC: 145 MMOL/L (ref 135–145)
SODIUM BLD-SCNC: 147 MMOL/L (ref 135–145)
SODIUM SERPL-SCNC: 131 MMOL/L (ref 136–145)
SODIUM SERPL-SCNC: 135 MMOL/L (ref 136–145)
SODIUM SERPL-SCNC: 140 MMOL/L (ref 136–145)
SODIUM SERPL-SCNC: 142 MMOL/L (ref 136–145)
SODIUM SERPL-SCNC: 146 MMOL/L (ref 136–145)
SODIUM SERPL-SCNC: 147 MMOL/L (ref 136–145)
SODIUM SERPL-SCNC: 148 MMOL/L (ref 136–145)
SODIUM SERPL-SCNC: 148 MMOL/L (ref 136–145)
SODIUM SERPL-SCNC: 149 MMOL/L (ref 136–145)
SODIUM SERPL-SCNC: 149 MMOL/L (ref 136–145)
SODIUM SERPL-SCNC: 152 MMOL/L (ref 136–145)
SP GR UR STRIP.AUTO: 1.01 (ref 1–1.03)
T AXIS: 85 DEGREES
TOTAL CELLS COUNTED BLD: 10
TOTAL CELLS COUNTED BLD: 100
TOTAL CELLS COUNTED BLD: 25
TROPONIN I HIGH SENSITIVITY: 14 NG/L
TROPONIN I HIGH SENSITIVITY: 15 NG/L
UNIT VOLUME: 199 ML
UNIT VOLUME: 200 ML
UNIT VOLUME: 206 ML
UNIT VOLUME: 277 ML
UNIT VOLUME: 350 ML
UNIT VOLUME: 350 ML
UROBILINOGEN UR STRIP.AUTO-MCNC: <2 MG/DL
VENT RATE: 14 /MIN
VENTRICULAR RATE: 135 BPM
WBC # BLD AUTO: 1.2 X10(3) UL (ref 4–11)
WBC # BLD AUTO: 1.4 X10(3) UL (ref 4–11)
WBC # BLD AUTO: 1.6 X10(3) UL (ref 4–11)
WBC # BLD AUTO: 1.7 X10(3) UL (ref 4–11)
WBC # BLD AUTO: 1.8 X10(3) UL (ref 4–11)
WBC # BLD AUTO: 1.8 X10(3) UL (ref 4–11)
WBC # BLD AUTO: 1.9 X10(3) UL (ref 4–11)
WBC # BLD AUTO: 2.4 X10(3) UL (ref 4–11)
WBC # BLD AUTO: 2.5 X10(3) UL (ref 4–11)
WBC # BLD AUTO: 2.6 X10(3) UL (ref 4–11)
WBC # BLD AUTO: 2.8 X10(3) UL (ref 4–11)
WBC # BLD AUTO: 3.2 X10(3) UL (ref 4–11)
WBC # BLD AUTO: 3.6 X10(3) UL (ref 4–11)
WBC # BLD AUTO: 4 X10(3) UL (ref 4–11)

## 2023-01-01 PROCEDURE — 99233 SBSQ HOSP IP/OBS HIGH 50: CPT | Performed by: HOSPITALIST

## 2023-01-01 PROCEDURE — 3E053XZ INTRODUCTION OF VASOPRESSOR INTO PERIPHERAL ARTERY, PERCUTANEOUS APPROACH: ICD-10-PCS | Performed by: HOSPITALIST

## 2023-01-01 PROCEDURE — 99232 SBSQ HOSP IP/OBS MODERATE 35: CPT | Performed by: HOSPITALIST

## 2023-01-01 PROCEDURE — 99223 1ST HOSP IP/OBS HIGH 75: CPT | Performed by: INTERNAL MEDICINE

## 2023-01-01 PROCEDURE — 71045 X-RAY EXAM CHEST 1 VIEW: CPT | Performed by: INTERNAL MEDICINE

## 2023-01-01 PROCEDURE — 1111F DSCHRG MED/CURRENT MED MERGE: CPT | Performed by: FAMILY MEDICINE

## 2023-01-01 PROCEDURE — 5A09357 ASSISTANCE WITH RESPIRATORY VENTILATION, LESS THAN 24 CONSECUTIVE HOURS, CONTINUOUS POSITIVE AIRWAY PRESSURE: ICD-10-PCS | Performed by: HOSPITALIST

## 2023-01-01 PROCEDURE — 30233R1 TRANSFUSION OF NONAUTOLOGOUS PLATELETS INTO PERIPHERAL VEIN, PERCUTANEOUS APPROACH: ICD-10-PCS | Performed by: HOSPITALIST

## 2023-01-01 PROCEDURE — 99235 HOSP IP/OBS SAME DATE MOD 70: CPT | Performed by: HOSPITALIST

## 2023-01-01 PROCEDURE — 99291 CRITICAL CARE FIRST HOUR: CPT | Performed by: INTERNAL MEDICINE

## 2023-01-01 PROCEDURE — 71045 X-RAY EXAM CHEST 1 VIEW: CPT | Performed by: EMERGENCY MEDICINE

## 2023-01-01 PROCEDURE — 71275 CT ANGIOGRAPHY CHEST: CPT | Performed by: NURSE PRACTITIONER

## 2023-01-01 PROCEDURE — 71045 X-RAY EXAM CHEST 1 VIEW: CPT | Performed by: HOSPITALIST

## 2023-01-01 PROCEDURE — 99232 SBSQ HOSP IP/OBS MODERATE 35: CPT | Performed by: INTERNAL MEDICINE

## 2023-01-01 PROCEDURE — 43752 NASAL/OROGASTRIC W/TUBE PLMT: CPT | Performed by: INTERNAL MEDICINE

## 2023-01-01 PROCEDURE — 74176 CT ABD & PELVIS W/O CONTRAST: CPT | Performed by: EMERGENCY MEDICINE

## 2023-01-01 PROCEDURE — 74340 X-RAY GUIDE FOR GI TUBE: CPT | Performed by: INTERNAL MEDICINE

## 2023-01-01 PROCEDURE — 44500 INTRO GASTROINTESTINAL TUBE: CPT | Performed by: INTERNAL MEDICINE

## 2023-01-01 PROCEDURE — 99233 SBSQ HOSP IP/OBS HIGH 50: CPT | Performed by: INTERNAL MEDICINE

## 2023-01-01 PROCEDURE — 0DH63UZ INSERTION OF FEEDING DEVICE INTO STOMACH, PERCUTANEOUS APPROACH: ICD-10-PCS | Performed by: RADIOLOGY

## 2023-01-01 PROCEDURE — 70450 CT HEAD/BRAIN W/O DYE: CPT | Performed by: EMERGENCY MEDICINE

## 2023-01-01 PROCEDURE — 30233R1 TRANSFUSION OF NONAUTOLOGOUS PLATELETS INTO PERIPHERAL VEIN, PERCUTANEOUS APPROACH: ICD-10-PCS | Performed by: INTERNAL MEDICINE

## 2023-01-01 PROCEDURE — 99239 HOSP IP/OBS DSCHRG MGMT >30: CPT | Performed by: HOSPITALIST

## 2023-01-01 PROCEDURE — 99223 1ST HOSP IP/OBS HIGH 75: CPT | Performed by: HOSPITALIST

## 2023-01-01 PROCEDURE — 70496 CT ANGIOGRAPHY HEAD: CPT | Performed by: INTERNAL MEDICINE

## 2023-01-01 PROCEDURE — 30233N1 TRANSFUSION OF NONAUTOLOGOUS RED BLOOD CELLS INTO PERIPHERAL VEIN, PERCUTANEOUS APPROACH: ICD-10-PCS | Performed by: HOSPITALIST

## 2023-01-01 PROCEDURE — 3E0G76Z INTRODUCTION OF NUTRITIONAL SUBSTANCE INTO UPPER GI, VIA NATURAL OR ARTIFICIAL OPENING: ICD-10-PCS | Performed by: INTERNAL MEDICINE

## 2023-01-01 PROCEDURE — 30233N1 TRANSFUSION OF NONAUTOLOGOUS RED BLOOD CELLS INTO PERIPHERAL VEIN, PERCUTANEOUS APPROACH: ICD-10-PCS | Performed by: INTERNAL MEDICINE

## 2023-01-01 RX ORDER — METOCLOPRAMIDE HYDROCHLORIDE 5 MG/ML
10 INJECTION INTRAMUSCULAR; INTRAVENOUS EVERY 8 HOURS PRN
Status: DISCONTINUED | OUTPATIENT
Start: 2023-01-01 | End: 2023-01-01

## 2023-01-01 RX ORDER — POTASSIUM CHLORIDE 1.5 G/1.58G
40 POWDER, FOR SOLUTION ORAL ONCE
Status: COMPLETED | OUTPATIENT
Start: 2023-01-01 | End: 2023-01-01

## 2023-01-01 RX ORDER — SCOLOPAMINE TRANSDERMAL SYSTEM 1 MG/1
1 PATCH, EXTENDED RELEASE TRANSDERMAL
Status: DISCONTINUED | OUTPATIENT
Start: 2023-01-01 | End: 2023-01-01

## 2023-01-01 RX ORDER — GABAPENTIN 250 MG/5ML
250 SOLUTION ORAL 2 TIMES DAILY
COMMUNITY

## 2023-01-01 RX ORDER — ACETAMINOPHEN 500 MG
500 TABLET ORAL EVERY 4 HOURS PRN
Status: DISCONTINUED | OUTPATIENT
Start: 2023-01-01 | End: 2023-01-01

## 2023-01-01 RX ORDER — INSULIN DETEMIR 100 [IU]/ML
15 INJECTION, SOLUTION SUBCUTANEOUS DAILY
Qty: 5 EACH | Refills: 0 | Status: SHIPPED | OUTPATIENT
Start: 2023-01-01 | End: 2023-08-13

## 2023-01-01 RX ORDER — MORPHINE SULFATE 2 MG/ML
0.5 INJECTION, SOLUTION INTRAMUSCULAR; INTRAVENOUS EVERY 4 HOURS PRN
Status: DISCONTINUED | OUTPATIENT
Start: 2023-01-01 | End: 2023-01-01

## 2023-01-01 RX ORDER — MORPHINE SULFATE 15 MG/1
30 TABLET, FILM COATED, EXTENDED RELEASE ORAL EVERY 12 HOURS SCHEDULED
Qty: 30 TABLET | Refills: 0 | Status: SHIPPED | OUTPATIENT
Start: 2023-01-01 | End: 2023-08-13

## 2023-01-01 RX ORDER — LANSOPRAZOLE 30 MG/1
30 TABLET, ORALLY DISINTEGRATING, DELAYED RELEASE ORAL
Status: DISCONTINUED | OUTPATIENT
Start: 2023-01-01 | End: 2023-01-01

## 2023-01-01 RX ORDER — MIDAZOLAM HYDROCHLORIDE 1 MG/ML
INJECTION INTRAMUSCULAR; INTRAVENOUS
Status: COMPLETED
Start: 2023-01-01 | End: 2023-01-01

## 2023-01-01 RX ORDER — NICOTINE POLACRILEX 4 MG
15 LOZENGE BUCCAL
Status: DISCONTINUED | OUTPATIENT
Start: 2023-01-01 | End: 2023-01-01

## 2023-01-01 RX ORDER — FINASTERIDE 5 MG/1
5 TABLET, FILM COATED ORAL DAILY
Status: DISCONTINUED | OUTPATIENT
Start: 2023-01-01 | End: 2023-01-01

## 2023-01-01 RX ORDER — IPRATROPIUM BROMIDE AND ALBUTEROL SULFATE 2.5; .5 MG/3ML; MG/3ML
3 SOLUTION RESPIRATORY (INHALATION) EVERY 6 HOURS PRN
Refills: 0 | Status: SHIPPED | COMMUNITY
Start: 2023-01-01

## 2023-01-01 RX ORDER — DEXAMETHASONE SODIUM PHOSPHATE 4 MG/ML
4 VIAL (ML) INJECTION DAILY
Status: DISCONTINUED | OUTPATIENT
Start: 2023-01-01 | End: 2023-01-01

## 2023-01-01 RX ORDER — DILTIAZEM HYDROCHLORIDE 5 MG/ML
10 INJECTION INTRAVENOUS EVERY 2 HOUR PRN
Status: DISCONTINUED | OUTPATIENT
Start: 2023-01-01 | End: 2023-01-01

## 2023-01-01 RX ORDER — ONDANSETRON 2 MG/ML
4 INJECTION INTRAMUSCULAR; INTRAVENOUS EVERY 6 HOURS PRN
Status: DISCONTINUED | OUTPATIENT
Start: 2023-01-01 | End: 2023-01-01

## 2023-01-01 RX ORDER — DILTIAZEM HYDROCHLORIDE 5 MG/ML
10 INJECTION INTRAVENOUS EVERY 6 HOURS PRN
Status: DISCONTINUED | OUTPATIENT
Start: 2023-01-01 | End: 2023-01-01

## 2023-01-01 RX ORDER — SODIUM CHLORIDE 9 MG/ML
INJECTION, SOLUTION INTRAVENOUS ONCE
Status: COMPLETED | OUTPATIENT
Start: 2023-01-01 | End: 2023-01-01

## 2023-01-01 RX ORDER — DEXTROSE MONOHYDRATE 25 G/50ML
50 INJECTION, SOLUTION INTRAVENOUS
Status: DISCONTINUED | OUTPATIENT
Start: 2023-01-01 | End: 2023-01-01

## 2023-01-01 RX ORDER — HYDROCODONE BITARTRATE AND ACETAMINOPHEN 5; 325 MG/1; MG/1
2 TABLET ORAL EVERY 4 HOURS PRN
Status: DISCONTINUED | OUTPATIENT
Start: 2023-01-01 | End: 2023-01-01

## 2023-01-01 RX ORDER — METOPROLOL SUCCINATE 25 MG/1
25 TABLET, EXTENDED RELEASE ORAL NIGHTLY
Status: DISCONTINUED | OUTPATIENT
Start: 2023-01-01 | End: 2023-01-01

## 2023-01-01 RX ORDER — DILTIAZEM HYDROCHLORIDE 5 MG/ML
5 INJECTION INTRAVENOUS ONCE
Status: COMPLETED | OUTPATIENT
Start: 2023-01-01 | End: 2023-01-01

## 2023-01-01 RX ORDER — DEXAMETHASONE SODIUM PHOSPHATE 4 MG/ML
4 INJECTION, SOLUTION INTRA-ARTICULAR; INTRALESIONAL; INTRAMUSCULAR; INTRAVENOUS; SOFT TISSUE DAILY
Status: DISCONTINUED | OUTPATIENT
Start: 2023-01-01 | End: 2023-01-01

## 2023-01-01 RX ORDER — AMOXICILLIN AND CLAVULANATE POTASSIUM 600; 42.9 MG/5ML; MG/5ML
875 POWDER, FOR SUSPENSION ORAL 2 TIMES DAILY
Status: DISCONTINUED | OUTPATIENT
Start: 2023-01-01 | End: 2023-01-01

## 2023-01-01 RX ORDER — BISACODYL 10 MG
10 SUPPOSITORY, RECTAL RECTAL
Status: DISCONTINUED | OUTPATIENT
Start: 2023-01-01 | End: 2023-01-01

## 2023-01-01 RX ORDER — LORAZEPAM 2 MG/ML
0.5 INJECTION INTRAMUSCULAR EVERY 30 MIN PRN
Status: DISCONTINUED | OUTPATIENT
Start: 2023-01-01 | End: 2023-01-01

## 2023-01-01 RX ORDER — POTASSIUM CHLORIDE 14.9 MG/ML
20 INJECTION INTRAVENOUS ONCE
Status: COMPLETED | OUTPATIENT
Start: 2023-01-01 | End: 2023-01-01

## 2023-01-01 RX ORDER — MORPHINE SULFATE 4 MG/ML
4 INJECTION, SOLUTION INTRAMUSCULAR; INTRAVENOUS EVERY 2 HOUR PRN
Status: DISCONTINUED | OUTPATIENT
Start: 2023-01-01 | End: 2023-01-01

## 2023-01-01 RX ORDER — HYDROMORPHONE HYDROCHLORIDE 1 MG/ML
0.5 INJECTION, SOLUTION INTRAMUSCULAR; INTRAVENOUS; SUBCUTANEOUS EVERY 30 MIN PRN
Status: ACTIVE | OUTPATIENT
Start: 2023-01-01 | End: 2023-01-01

## 2023-01-01 RX ORDER — IPRATROPIUM BROMIDE AND ALBUTEROL SULFATE 2.5; .5 MG/3ML; MG/3ML
3 SOLUTION RESPIRATORY (INHALATION) EVERY 6 HOURS PRN
Status: DISCONTINUED | OUTPATIENT
Start: 2023-01-01 | End: 2023-01-01

## 2023-01-01 RX ORDER — METOPROLOL TARTRATE 5 MG/5ML
5 INJECTION INTRAVENOUS EVERY 6 HOURS SCHEDULED
Status: DISCONTINUED | OUTPATIENT
Start: 2023-01-01 | End: 2023-01-01

## 2023-01-01 RX ORDER — INSULIN ASPART 100 [IU]/ML
INJECTION, SOLUTION INTRAVENOUS; SUBCUTANEOUS
Qty: 5 EACH | Refills: 0 | Status: SHIPPED | OUTPATIENT
Start: 2023-01-01 | End: 2023-08-13

## 2023-01-01 RX ORDER — ONDANSETRON 2 MG/ML
4 INJECTION INTRAMUSCULAR; INTRAVENOUS EVERY 4 HOURS PRN
Status: DISCONTINUED | OUTPATIENT
Start: 2023-01-01 | End: 2023-01-01 | Stop reason: ALTCHOICE

## 2023-01-01 RX ORDER — ECHINACEA PURPUREA EXTRACT 125 MG
1 TABLET ORAL
Status: DISCONTINUED | OUTPATIENT
Start: 2023-01-01 | End: 2023-01-01

## 2023-01-01 RX ORDER — HYDROCODONE BITARTRATE AND ACETAMINOPHEN 5; 325 MG/1; MG/1
1 TABLET ORAL EVERY 4 HOURS PRN
Status: DISCONTINUED | OUTPATIENT
Start: 2023-01-01 | End: 2023-01-01

## 2023-01-01 RX ORDER — SODIUM CHLORIDE 9 MG/ML
INJECTION, SOLUTION INTRAVENOUS CONTINUOUS
Status: DISCONTINUED | OUTPATIENT
Start: 2023-01-01 | End: 2023-01-01

## 2023-01-01 RX ORDER — ENEMA 19; 7 G/133ML; G/133ML
1 ENEMA RECTAL ONCE AS NEEDED
Status: DISCONTINUED | OUTPATIENT
Start: 2023-01-01 | End: 2023-01-01

## 2023-01-01 RX ORDER — MORPHINE SULFATE 2 MG/ML
1 INJECTION, SOLUTION INTRAMUSCULAR; INTRAVENOUS EVERY 2 HOUR PRN
Status: DISCONTINUED | OUTPATIENT
Start: 2023-01-01 | End: 2023-01-01

## 2023-01-01 RX ORDER — SODIUM CHLORIDE 9 MG/ML
INJECTION, SOLUTION INTRAVENOUS ONCE
Status: DISCONTINUED | OUTPATIENT
Start: 2023-01-01 | End: 2023-01-01

## 2023-01-01 RX ORDER — MINERAL OIL AND PETROLATUM 150; 830 MG/G; MG/G
OINTMENT OPHTHALMIC EVERY 4 HOURS
Status: DISCONTINUED | OUTPATIENT
Start: 2023-01-01 | End: 2023-01-01

## 2023-01-01 RX ORDER — POLYETHYLENE GLYCOL 3350 17 G/17G
17 POWDER, FOR SOLUTION ORAL DAILY PRN
Status: DISCONTINUED | OUTPATIENT
Start: 2023-01-01 | End: 2023-01-01

## 2023-01-01 RX ORDER — GABAPENTIN 250 MG/5ML
250 SOLUTION ORAL 2 TIMES DAILY
Status: DISCONTINUED | OUTPATIENT
Start: 2023-01-01 | End: 2023-01-01

## 2023-01-01 RX ORDER — SODIUM BICARBONATE 325 MG/1
325 TABLET ORAL AS NEEDED
Status: DISCONTINUED | OUTPATIENT
Start: 2023-01-01 | End: 2023-01-01

## 2023-01-01 RX ORDER — SENNOSIDES 8.6 MG
17.2 TABLET ORAL NIGHTLY PRN
Status: DISCONTINUED | OUTPATIENT
Start: 2023-01-01 | End: 2023-01-01

## 2023-01-01 RX ORDER — MORPHINE SULFATE 15 MG/1
30 TABLET, FILM COATED, EXTENDED RELEASE ORAL EVERY 12 HOURS SCHEDULED
Qty: 6 TABLET | Refills: 0 | Status: SHIPPED | OUTPATIENT
Start: 2023-01-01 | End: 2023-01-01

## 2023-01-01 RX ORDER — FUROSEMIDE 10 MG/ML
20 INJECTION INTRAMUSCULAR; INTRAVENOUS ONCE
Status: COMPLETED | OUTPATIENT
Start: 2023-01-01 | End: 2023-01-01

## 2023-01-01 RX ORDER — IPRATROPIUM BROMIDE AND ALBUTEROL SULFATE 2.5; .5 MG/3ML; MG/3ML
3 SOLUTION RESPIRATORY (INHALATION)
Status: DISCONTINUED | OUTPATIENT
Start: 2023-01-01 | End: 2023-01-01

## 2023-01-01 RX ORDER — MORPHINE SULFATE 2 MG/ML
1 INJECTION, SOLUTION INTRAMUSCULAR; INTRAVENOUS EVERY 4 HOURS PRN
Status: DISCONTINUED | OUTPATIENT
Start: 2023-01-01 | End: 2023-01-01

## 2023-01-01 RX ORDER — ACETAMINOPHEN 325 MG/1
650 TABLET ORAL EVERY 4 HOURS PRN
Status: DISCONTINUED | OUTPATIENT
Start: 2023-01-01 | End: 2023-01-01

## 2023-01-01 RX ORDER — ACETAMINOPHEN 650 MG/1
650 SUPPOSITORY RECTAL EVERY 6 HOURS PRN
Status: DISCONTINUED | OUTPATIENT
Start: 2023-01-01 | End: 2023-01-01

## 2023-01-01 RX ORDER — NICOTINE POLACRILEX 4 MG
30 LOZENGE BUCCAL
Status: DISCONTINUED | OUTPATIENT
Start: 2023-01-01 | End: 2023-01-01

## 2023-01-01 RX ORDER — FUROSEMIDE 10 MG/ML
20 INJECTION INTRAMUSCULAR; INTRAVENOUS ONCE
Status: DISCONTINUED | OUTPATIENT
Start: 2023-01-01 | End: 2023-01-01

## 2023-01-01 RX ORDER — DEXTROSE AND SODIUM CHLORIDE 5; .45 G/100ML; G/100ML
INJECTION, SOLUTION INTRAVENOUS CONTINUOUS
Status: DISCONTINUED | OUTPATIENT
Start: 2023-01-01 | End: 2023-01-01

## 2023-01-01 RX ORDER — SODIUM CHLORIDE 9 MG/ML
INJECTION, SOLUTION INTRAVENOUS CONTINUOUS
Status: ACTIVE | OUTPATIENT
Start: 2023-01-01 | End: 2023-01-01

## 2023-01-01 RX ORDER — METOPROLOL TARTRATE 5 MG/5ML
5 INJECTION INTRAVENOUS EVERY 4 HOURS PRN
Status: DISCONTINUED | OUTPATIENT
Start: 2023-01-01 | End: 2023-01-01

## 2023-01-01 RX ORDER — CEFAZOLIN SODIUM 1 G/3ML
INJECTION, POWDER, FOR SOLUTION INTRAMUSCULAR; INTRAVENOUS
Status: COMPLETED
Start: 2023-01-01 | End: 2023-01-01

## 2023-01-01 RX ORDER — MORPHINE SULFATE 2 MG/ML
2 INJECTION, SOLUTION INTRAMUSCULAR; INTRAVENOUS EVERY 2 HOUR PRN
Status: DISCONTINUED | OUTPATIENT
Start: 2023-01-01 | End: 2023-01-01

## 2023-01-01 RX ORDER — ENOXAPARIN SODIUM 100 MG/ML
40 INJECTION SUBCUTANEOUS DAILY
Status: DISCONTINUED | OUTPATIENT
Start: 2023-01-01 | End: 2023-01-01

## 2023-01-01 RX ORDER — DEXAMETHASONE 4 MG/1
4 TABLET ORAL DAILY
Status: DISCONTINUED | OUTPATIENT
Start: 2023-01-01 | End: 2023-01-01

## 2023-01-01 RX ORDER — TAMSULOSIN HYDROCHLORIDE 0.4 MG/1
0.4 CAPSULE ORAL
Status: DISCONTINUED | OUTPATIENT
Start: 2023-01-01 | End: 2023-01-01

## 2023-01-01 RX ORDER — PANTOPRAZOLE SODIUM 40 MG/1
40 TABLET, DELAYED RELEASE ORAL
Status: DISCONTINUED | OUTPATIENT
Start: 2023-01-01 | End: 2023-01-01 | Stop reason: SDUPTHER

## 2023-01-01 RX ORDER — DEXAMETHASONE 4 MG/1
4 TABLET ORAL
Status: DISCONTINUED | OUTPATIENT
Start: 2023-01-01 | End: 2023-01-01

## 2023-01-01 RX ORDER — LIDOCAINE HYDROCHLORIDE 10 MG/ML
INJECTION, SOLUTION INFILTRATION; PERINEURAL
Status: COMPLETED
Start: 2023-01-01 | End: 2023-01-01

## 2023-01-01 RX ORDER — BENZONATATE 100 MG/1
200 CAPSULE ORAL 3 TIMES DAILY PRN
Status: DISCONTINUED | OUTPATIENT
Start: 2023-01-01 | End: 2023-01-01

## 2023-01-01 RX ORDER — MELATONIN
3 NIGHTLY PRN
Status: DISCONTINUED | OUTPATIENT
Start: 2023-01-01 | End: 2023-01-01

## 2023-01-26 NOTE — LETTER
[facility] [place] BLOOD OR BLOOD COMPONENTS      In the course of your treatment, it may become necessary to administer a transfusion of blood or blood components. This form provides basic information concerning this procedure and, if signed by you, authorizes its performance by qualified medical personnel. DESCRIPTION OF PROCEDURE:  Blood is introduced into one of your veins, commonly in the arm, using a sterilized disposable needle. The amount of blood transfused, and whether the transfusion will be of blood or blood components is a judgment the physician will make based on your particular needs. RISKS:  The transfusion is a common procedure of low risk. MINOR AND TEMPORARY REACTIONS ARE NOT UNCOMMON, including a slight bruise, swelling or local reaction in the area where the needle pierces your skin, or a non-serious reaction to the transfused material itself, including headache, fever or a mild skin reaction, such as rash. Serious reactions are possible, though very unlikely and include severe allergic reaction (shock)  and destruction (hemolysis) of transfused blood cells. Infectious diseases which are known to be transmitted by blood transfusion include CERTAIN TYPES OF VIRAL HEPATITIS, a viral infection of the liver, HUMAN IMMUNODEFICIENCY VIRUS (HIV-1,2) infection, a viral infection known to cause ACQUIRED IMMUNODEFICIENCY SYNDROME (AIDS) AS WELL AS CERTAIN OTHER BACTERIAL, VIRAL AND PARASITIC DISEASES. While a minimal risk of acquiring an infectious disease from transfused blood exists, in accordance with Federal and State law all due care has been taken in donor selection and testing to avoid transmission of disease. ALTERNATIVES:  If loss of blood poses serious threats in the course of your treatment, THERE IS NO EFFECTIVE ALTERNATIVE TO BLOOD TRANSFUSION.  However, if you have any further questions on this matter, your physician will fully explain the alternatives to you if it has not already been done. I,Kael Bishop, have read/had read to me the above. I understand the matters bearing on the decision whether or not to authorize a transfusion of blood or blood components. I have no questions which have not been answered to my full satisfaction.  I hereby consent to such transfusion as  my physician may deem necessary or advisable in the course of my treatment.        _______________   __________________________________________________  Date     Signature of Patient, Parent or Legal Guardian      ([facility])      __________________________________________  Witness to Signature (title or relationship to patient)    Patient Name: Polina Nesbitt     : 1955                 Printed: 2023     Medical Record #: [id number]                    Page 1 of 1 No

## 2023-03-01 ENCOUNTER — APPOINTMENT (OUTPATIENT)
Dept: GENERAL RADIOLOGY | Facility: HOSPITAL | Age: 68
End: 2023-03-01
Attending: EMERGENCY MEDICINE
Payer: MEDICAID

## 2023-03-01 ENCOUNTER — APPOINTMENT (OUTPATIENT)
Dept: MRI IMAGING | Facility: HOSPITAL | Age: 68
End: 2023-03-01
Attending: EMERGENCY MEDICINE
Payer: MEDICAID

## 2023-03-01 ENCOUNTER — HOSPITAL ENCOUNTER (INPATIENT)
Facility: HOSPITAL | Age: 68
LOS: 9 days | Discharge: NURSING, REHAB OR LONG-TERM CARE | End: 2023-03-10
Attending: INTERNAL MEDICINE | Admitting: HOSPITALIST
Payer: MEDICAID

## 2023-03-01 ENCOUNTER — APPOINTMENT (OUTPATIENT)
Dept: MRI IMAGING | Facility: HOSPITAL | Age: 68
End: 2023-03-01
Attending: Other
Payer: MEDICAID

## 2023-03-01 DIAGNOSIS — C79.51 PROSTATE CANCER METASTATIC TO BONE (HCC): ICD-10-CM

## 2023-03-01 DIAGNOSIS — C61 PROSTATE CANCER METASTATIC TO BONE (HCC): ICD-10-CM

## 2023-03-01 DIAGNOSIS — R53.1 WEAKNESS: Primary | ICD-10-CM

## 2023-03-01 PROBLEM — E87.1 HYPONATREMIA: Status: ACTIVE | Noted: 2023-03-01

## 2023-03-01 PROBLEM — E87.1 HYPONATREMIA: Status: ACTIVE | Noted: 2023-01-01

## 2023-03-01 LAB
ALBUMIN SERPL-MCNC: 3 G/DL (ref 3.4–5)
ALBUMIN/GLOB SERPL: 0.7 {RATIO} (ref 1–2)
ALP LIVER SERPL-CCNC: 930 U/L
ALT SERPL-CCNC: 28 U/L
ANION GAP SERPL CALC-SCNC: 3 MMOL/L (ref 0–18)
AST SERPL-CCNC: 77 U/L (ref 15–37)
BASOPHILS # BLD AUTO: 0.01 X10(3) UL (ref 0–0.2)
BASOPHILS NFR BLD AUTO: 0.1 %
BILIRUB SERPL-MCNC: 0.7 MG/DL (ref 0.1–2)
BILIRUB UR QL STRIP.AUTO: NEGATIVE
BUN BLD-MCNC: 20 MG/DL (ref 7–18)
CALCIUM BLD-MCNC: 8.9 MG/DL (ref 8.5–10.1)
CHLORIDE SERPL-SCNC: 107 MMOL/L (ref 98–112)
CLARITY UR REFRACT.AUTO: CLEAR
CO2 SERPL-SCNC: 24 MMOL/L (ref 21–32)
COLOR UR AUTO: YELLOW
CREAT BLD-MCNC: 1.18 MG/DL
EOSINOPHIL # BLD AUTO: 0.03 X10(3) UL (ref 0–0.7)
EOSINOPHIL NFR BLD AUTO: 0.4 %
ERYTHROCYTE [DISTWIDTH] IN BLOOD BY AUTOMATED COUNT: 12.6 %
EST. AVERAGE GLUCOSE BLD GHB EST-MCNC: 134 MG/DL (ref 68–126)
GFR SERPLBLD BASED ON 1.73 SQ M-ARVRAT: 68 ML/MIN/1.73M2 (ref 60–?)
GLOBULIN PLAS-MCNC: 4.2 G/DL (ref 2.8–4.4)
GLUCOSE BLD-MCNC: 149 MG/DL (ref 70–99)
GLUCOSE BLD-MCNC: 169 MG/DL (ref 70–99)
GLUCOSE BLD-MCNC: 212 MG/DL (ref 70–99)
GLUCOSE UR STRIP.AUTO-MCNC: NEGATIVE MG/DL
HBA1C MFR BLD: 6.3 % (ref ?–5.7)
HCT VFR BLD AUTO: 37.8 %
HGB BLD-MCNC: 12.4 G/DL
HYALINE CASTS #/AREA URNS AUTO: PRESENT /LPF
IMM GRANULOCYTES # BLD AUTO: 0.03 X10(3) UL (ref 0–1)
IMM GRANULOCYTES NFR BLD: 0.4 %
INR BLD: 2.8 (ref 0.85–1.16)
KETONES UR STRIP.AUTO-MCNC: NEGATIVE MG/DL
LDH SERPL L TO P-CCNC: 1651 U/L
LEUKOCYTE ESTERASE UR QL STRIP.AUTO: NEGATIVE
LYMPHOCYTES # BLD AUTO: 1.27 X10(3) UL (ref 1–4)
LYMPHOCYTES NFR BLD AUTO: 15.9 %
MCH RBC QN AUTO: 27.9 PG (ref 26–34)
MCHC RBC AUTO-ENTMCNC: 32.8 G/DL (ref 31–37)
MCV RBC AUTO: 84.9 FL
MONOCYTES # BLD AUTO: 0.75 X10(3) UL (ref 0.1–1)
MONOCYTES NFR BLD AUTO: 9.4 %
NEUTROPHILS # BLD AUTO: 5.92 X10 (3) UL (ref 1.5–7.7)
NEUTROPHILS # BLD AUTO: 5.92 X10(3) UL (ref 1.5–7.7)
NEUTROPHILS NFR BLD AUTO: 73.8 %
NITRITE UR QL STRIP.AUTO: NEGATIVE
OSMOLALITY SERPL CALC.SUM OF ELEC: 287 MOSM/KG (ref 275–295)
PH UR STRIP.AUTO: 5 [PH] (ref 5–8)
PLATELET # BLD AUTO: 263 10(3)UL (ref 150–450)
POTASSIUM SERPL-SCNC: 4.7 MMOL/L (ref 3.5–5.1)
PROT SERPL-MCNC: 7.2 G/DL (ref 6.4–8.2)
PROT UR STRIP.AUTO-MCNC: 30 MG/DL
PROTHROMBIN TIME: 29.1 SECONDS (ref 11.6–14.8)
PSA SERPL-MCNC: 50 NG/ML (ref ?–4)
RBC # BLD AUTO: 4.45 X10(6)UL
RBC UR QL AUTO: NEGATIVE
SARS-COV-2 RNA RESP QL NAA+PROBE: NOT DETECTED
SODIUM SERPL-SCNC: 134 MMOL/L (ref 136–145)
SP GR UR STRIP.AUTO: 1.02 (ref 1–1.03)
UROBILINOGEN UR STRIP.AUTO-MCNC: <2 MG/DL
WBC # BLD AUTO: 8 X10(3) UL (ref 4–11)

## 2023-03-01 PROCEDURE — 73502 X-RAY EXAM HIP UNI 2-3 VIEWS: CPT | Performed by: EMERGENCY MEDICINE

## 2023-03-01 PROCEDURE — 99223 1ST HOSP IP/OBS HIGH 75: CPT | Performed by: INTERNAL MEDICINE

## 2023-03-01 PROCEDURE — 99223 1ST HOSP IP/OBS HIGH 75: CPT | Performed by: STUDENT IN AN ORGANIZED HEALTH CARE EDUCATION/TRAINING PROGRAM

## 2023-03-01 PROCEDURE — 73552 X-RAY EXAM OF FEMUR 2/>: CPT | Performed by: EMERGENCY MEDICINE

## 2023-03-01 PROCEDURE — 72158 MRI LUMBAR SPINE W/O & W/DYE: CPT | Performed by: EMERGENCY MEDICINE

## 2023-03-01 RX ORDER — SENNOSIDES 8.6 MG
17.2 TABLET ORAL NIGHTLY PRN
Status: DISCONTINUED | OUTPATIENT
Start: 2023-03-01 | End: 2023-03-10

## 2023-03-01 RX ORDER — FINASTERIDE 5 MG/1
5 TABLET, FILM COATED ORAL DAILY
COMMUNITY

## 2023-03-01 RX ORDER — NICOTINE POLACRILEX 4 MG
15 LOZENGE BUCCAL
Status: DISCONTINUED | OUTPATIENT
Start: 2023-03-01 | End: 2023-03-10

## 2023-03-01 RX ORDER — FINASTERIDE 5 MG/1
5 TABLET, FILM COATED ORAL DAILY
Status: DISCONTINUED | OUTPATIENT
Start: 2023-03-01 | End: 2023-03-10

## 2023-03-01 RX ORDER — SODIUM PHOSPHATE, DIBASIC AND SODIUM PHOSPHATE, MONOBASIC 7; 19 G/133ML; G/133ML
1 ENEMA RECTAL ONCE AS NEEDED
Status: DISCONTINUED | OUTPATIENT
Start: 2023-03-01 | End: 2023-03-10

## 2023-03-01 RX ORDER — GADOTERATE MEGLUMINE 376.9 MG/ML
20 INJECTION INTRAVENOUS
Status: COMPLETED | OUTPATIENT
Start: 2023-03-01 | End: 2023-03-01

## 2023-03-01 RX ORDER — MELATONIN
3 NIGHTLY PRN
Status: DISCONTINUED | OUTPATIENT
Start: 2023-03-01 | End: 2023-03-10

## 2023-03-01 RX ORDER — NICOTINE POLACRILEX 4 MG
30 LOZENGE BUCCAL
Status: DISCONTINUED | OUTPATIENT
Start: 2023-03-01 | End: 2023-03-10

## 2023-03-01 RX ORDER — HYDROCODONE BITARTRATE AND ACETAMINOPHEN 5; 325 MG/1; MG/1
1 TABLET ORAL EVERY 4 HOURS PRN
Status: DISCONTINUED | OUTPATIENT
Start: 2023-03-01 | End: 2023-03-05

## 2023-03-01 RX ORDER — APALUTAMIDE 60 MG/1
60 TABLET, FILM COATED ORAL 4 TIMES DAILY
COMMUNITY
Start: 2023-02-20

## 2023-03-01 RX ORDER — TAMSULOSIN HYDROCHLORIDE 0.4 MG/1
0.4 CAPSULE ORAL EVERY EVENING
Status: DISCONTINUED | OUTPATIENT
Start: 2023-03-01 | End: 2023-03-10

## 2023-03-01 RX ORDER — WARFARIN SODIUM 7.5 MG/1
7.5 TABLET ORAL DAILY
Status: ON HOLD | COMMUNITY
Start: 2023-01-24 | End: 2023-03-08

## 2023-03-01 RX ORDER — ACETAMINOPHEN 325 MG/1
650 TABLET ORAL EVERY 4 HOURS PRN
Status: DISCONTINUED | OUTPATIENT
Start: 2023-03-01 | End: 2023-03-10

## 2023-03-01 RX ORDER — ACETAMINOPHEN 500 MG
500 TABLET ORAL EVERY 4 HOURS PRN
Status: DISCONTINUED | OUTPATIENT
Start: 2023-03-01 | End: 2023-03-10

## 2023-03-01 RX ORDER — ONDANSETRON 2 MG/ML
4 INJECTION INTRAMUSCULAR; INTRAVENOUS EVERY 6 HOURS PRN
Status: DISCONTINUED | OUTPATIENT
Start: 2023-03-01 | End: 2023-03-10

## 2023-03-01 RX ORDER — HYDROCODONE BITARTRATE AND ACETAMINOPHEN 5; 325 MG/1; MG/1
2 TABLET ORAL EVERY 4 HOURS PRN
Status: DISCONTINUED | OUTPATIENT
Start: 2023-03-01 | End: 2023-03-05

## 2023-03-01 RX ORDER — BISACODYL 10 MG
10 SUPPOSITORY, RECTAL RECTAL
Status: DISCONTINUED | OUTPATIENT
Start: 2023-03-01 | End: 2023-03-10

## 2023-03-01 RX ORDER — TAMSULOSIN HYDROCHLORIDE 0.4 MG/1
0.4 CAPSULE ORAL DAILY
COMMUNITY

## 2023-03-01 RX ORDER — DEXTROSE MONOHYDRATE 25 G/50ML
50 INJECTION, SOLUTION INTRAVENOUS
Status: DISCONTINUED | OUTPATIENT
Start: 2023-03-01 | End: 2023-03-10

## 2023-03-01 RX ORDER — POLYETHYLENE GLYCOL 3350 17 G/17G
17 POWDER, FOR SOLUTION ORAL DAILY PRN
Status: DISCONTINUED | OUTPATIENT
Start: 2023-03-01 | End: 2023-03-10

## 2023-03-01 NOTE — ED QUICK NOTES
Orders for admission, patient is aware of plan and ready to go upstairs. Any questions, please call ED RN Pierre Purvis at extension [phone].      Patient Covid vaccination status: Fully vaccinated     COVID Test Ordered in ED: Rapid SARS-CoV-2 by PCR    COVID Suspicion at Admission: N/A    Running Infusions:  None    Mental Status/LOC at time of transport: AAOX4    Other pertinent information:   CIWA score: N/A   NIH score:  N/A

## 2023-03-01 NOTE — ED QUICK NOTES
Per MD MRI called him and stated pt could go to MRI. MRI tech just got off the phone with this RN stating he called for pt 30min ago. This RN was not aware. Per EDT they did call and pt was in XR and asked them to have pt go to MRI post XR which apparently wasn't done. This RN explained to MRI tech that no one is able to bring pt at this time and I was unaware they called 30min ago. MRI tech also states he is unable to get pt and will do the next stat pt. Explained to him pt is awaiting to go to floor for MRI. Pt then taken to MRI by [facility] transporter per MD order.  Pt can go to floor after MRI per hospitalist

## 2023-03-01 NOTE — ED INITIAL ASSESSMENT (HPI)
Pt to ED for left leg pain and swelling. Pt has hx of a fib and is on coumadin. Pt also has hx of stage 4 prostate CA and recently had 17 hour flight. +CMS to bilateral legs with strong pedal pulses.  Pt states pain radiates from hip down leg denies trauma

## 2023-03-01 NOTE — ED QUICK NOTES
Report given to [facility] on 4th floor. She is aware pt is in MRI and will be there after its complete.  MRI made aware to send pt to floor post scan

## 2023-03-02 ENCOUNTER — APPOINTMENT (OUTPATIENT)
Dept: MRI IMAGING | Facility: HOSPITAL | Age: 68
End: 2023-03-02
Attending: Other
Payer: MEDICAID

## 2023-03-02 LAB
ANION GAP SERPL CALC-SCNC: 5 MMOL/L (ref 0–18)
BASOPHILS # BLD AUTO: 0.01 X10(3) UL (ref 0–0.2)
BASOPHILS NFR BLD AUTO: 0.2 %
BUN BLD-MCNC: 18 MG/DL (ref 7–18)
CALCIUM BLD-MCNC: 8.6 MG/DL (ref 8.5–10.1)
CHLORIDE SERPL-SCNC: 109 MMOL/L (ref 98–112)
CO2 SERPL-SCNC: 22 MMOL/L (ref 21–32)
CREAT BLD-MCNC: 0.76 MG/DL
EOSINOPHIL # BLD AUTO: 0.02 X10(3) UL (ref 0–0.7)
EOSINOPHIL NFR BLD AUTO: 0.3 %
ERYTHROCYTE [DISTWIDTH] IN BLOOD BY AUTOMATED COUNT: 12.4 %
GFR SERPLBLD BASED ON 1.73 SQ M-ARVRAT: 100 ML/MIN/1.73M2 (ref 60–?)
GLUCOSE BLD-MCNC: 170 MG/DL (ref 70–99)
GLUCOSE BLD-MCNC: 185 MG/DL (ref 70–99)
GLUCOSE BLD-MCNC: 186 MG/DL (ref 70–99)
GLUCOSE BLD-MCNC: 194 MG/DL (ref 70–99)
GLUCOSE BLD-MCNC: 261 MG/DL (ref 70–99)
HCT VFR BLD AUTO: 33.3 %
HGB BLD-MCNC: 11 G/DL
IMM GRANULOCYTES # BLD AUTO: 0.03 X10(3) UL (ref 0–1)
IMM GRANULOCYTES NFR BLD: 0.5 %
LYMPHOCYTES # BLD AUTO: 1.51 X10(3) UL (ref 1–4)
LYMPHOCYTES NFR BLD AUTO: 25.5 %
MCH RBC QN AUTO: 28 PG (ref 26–34)
MCHC RBC AUTO-ENTMCNC: 33 G/DL (ref 31–37)
MCV RBC AUTO: 84.7 FL
MONOCYTES # BLD AUTO: 0.6 X10(3) UL (ref 0.1–1)
MONOCYTES NFR BLD AUTO: 10.2 %
NEUTROPHILS # BLD AUTO: 3.74 X10 (3) UL (ref 1.5–7.7)
NEUTROPHILS # BLD AUTO: 3.74 X10(3) UL (ref 1.5–7.7)
NEUTROPHILS NFR BLD AUTO: 63.3 %
OSMOLALITY SERPL CALC.SUM OF ELEC: 289 MOSM/KG (ref 275–295)
PLATELET # BLD AUTO: 255 10(3)UL (ref 150–450)
POTASSIUM SERPL-SCNC: 3.9 MMOL/L (ref 3.5–5.1)
Q-T INTERVAL: 342 MS
QRS DURATION: 92 MS
QTC CALCULATION (BEZET): 452 MS
R AXIS: -23 DEGREES
RBC # BLD AUTO: 3.93 X10(6)UL
SODIUM SERPL-SCNC: 136 MMOL/L (ref 136–145)
T AXIS: 12 DEGREES
VENTRICULAR RATE: 105 BPM
WBC # BLD AUTO: 5.9 X10(3) UL (ref 4–11)

## 2023-03-02 PROCEDURE — 72141 MRI NECK SPINE W/O DYE: CPT | Performed by: OTHER

## 2023-03-02 PROCEDURE — 99232 SBSQ HOSP IP/OBS MODERATE 35: CPT | Performed by: INTERNAL MEDICINE

## 2023-03-02 PROCEDURE — 72146 MRI CHEST SPINE W/O DYE: CPT | Performed by: OTHER

## 2023-03-02 PROCEDURE — 99254 IP/OBS CNSLTJ NEW/EST MOD 60: CPT | Performed by: INTERNAL MEDICINE

## 2023-03-02 PROCEDURE — 99222 1ST HOSP IP/OBS MODERATE 55: CPT | Performed by: OTHER

## 2023-03-02 RX ORDER — DEXAMETHASONE 4 MG/1
4 TABLET ORAL EVERY 12 HOURS SCHEDULED
Status: DISCONTINUED | OUTPATIENT
Start: 2023-03-02 | End: 2023-03-10

## 2023-03-02 RX ORDER — GABAPENTIN 100 MG/1
100 CAPSULE ORAL 3 TIMES DAILY
Status: COMPLETED | OUTPATIENT
Start: 2023-03-02 | End: 2023-03-03

## 2023-03-02 RX ORDER — GABAPENTIN 100 MG/1
200 CAPSULE ORAL 3 TIMES DAILY
Status: DISCONTINUED | OUTPATIENT
Start: 2023-03-04 | End: 2023-03-10

## 2023-03-02 RX ORDER — DEXAMETHASONE 4 MG/1
4 TABLET ORAL EVERY 12 HOURS SCHEDULED
Qty: 40 TABLET | Refills: 0 | Status: SHIPPED | OUTPATIENT
Start: 2023-03-02

## 2023-03-02 NOTE — PLAN OF CARE
Seen by Dr. Arnol Oswald from orthopedics spine. See notes. Remains alert and oriented. Still c/o legs pain. IV decadron given. Prn norco given with some relief. Dr. Daina Alonzo of MRI orders changed to be done without contrast. MRI thoracic/ cervical done tonight, result pending. Patient still c/o weakness/ pain to legs. Incontinent of urine. No new symptoms. Family at the bedside.    Problem: MUSCULOSKELETAL - ADULT  Goal: Return mobility to safest level of function  Description: INTERVENTIONS:  - Assess patient stability and activity tolerance for standing, transferring and ambulating w/ or w/o assistive devices  - Assist with transfers and ambulation using safe patient handling equipment as needed  - Ensure adequate protection for wounds/incisions during mobilization  - Obtain PT/OT consults as needed  - Advance activity as appropriate  - Communicate ordered activity level and limitations with patient/family  Outcome: Not Progressing

## 2023-03-02 NOTE — PLAN OF CARE
Patient admitted via [address] to room. Safety precautions initiated. Bed in low position. Call light in reach. Pt AOx4. VSS. RA. Speaks english not very well. Reported BLLE pain. Tylenol 650 mg administered PRN. Good appetite. Family at the bedside. MRI lumbar done. MRI thoracic and cervical ordered. Will continue plan of care.        Problem: Diabetes/Glucose Control  Goal: Glucose maintained within prescribed range  Description: INTERVENTIONS:  - Monitor Blood Glucose as ordered  - Assess for signs and symptoms of hyperglycemia and hypoglycemia  - Administer ordered medications to maintain glucose within target range  - Assess barriers to adequate nutritional intake and initiate nutrition consult as needed  - Instruct patient on self management of diabetes  3/1/2023 1818 by Pedro Luis Pearson RN  Outcome: Progressing  3/1/2023 1818 by Pedro Luis Pearson RN  Outcome: Progressing     Problem: MUSCULOSKELETAL - ADULT  Goal: Return mobility to safest level of function  Description: INTERVENTIONS:  - Assess patient stability and activity tolerance for standing, transferring and ambulating w/ or w/o assistive devices  - Assist with transfers and ambulation using safe patient handling equipment as needed  - Ensure adequate protection for wounds/incisions during mobilization  - Obtain PT/OT consults as needed  - Advance activity as appropriate  - Communicate ordered activity level and limitations with patient/family  3/1/2023 1818 by Pedro Luis Pearson RN  Outcome: Progressing  3/1/2023 1818 by Pedro Luis Pearson RN  Outcome: Progressing     Problem: NEUROLOGICAL - ADULT  Goal: Achieves stable or improved neurological status  Description: INTERVENTIONS  - Assess for and report changes in neurological status  - Initiate measures to prevent increased intracranial pressure  - Maintain blood pressure and fluid volume within ordered parameters to optimize cerebral perfusion and minimize risk of hemorrhage  - Monitor temperature, glucose, and sodium.  Initiate appropriate interventions as ordered  3/1/2023 1818 by Horace Tucker RN  Outcome: Progressing  3/1/2023 1818 by Horace Tucker RN  Outcome: Progressing     Problem: PAIN - ADULT  Goal: Verbalizes/displays adequate comfort level or patient's stated pain goal  Description: INTERVENTIONS:  - Encourage pt to monitor pain and request assistance  - Assess pain using appropriate pain scale  - Administer analgesics based on type and severity of pain and evaluate response  - Implement non-pharmacological measures as appropriate and evaluate response  - Consider cultural and social influences on pain and pain management  - Manage/alleviate anxiety  - Utilize distraction and/or relaxation techniques  - Monitor for opioid side effects  - Notify MD/LIP if interventions unsuccessful or patient reports new pain  - Anticipate increased pain with activity and pre-medicate as appropriate  Outcome: Progressing

## 2023-03-02 NOTE — CM/SW NOTE
SW noted PT recommendation for [facility] placement for patient. SW sent referral for [facility] via [facility] and will discuss options list with patient and family once it is available. SW noted consult order for shower bench. SW will discuss DME and desired DC plan with patient and family once [facility] choice list is available. SW will continue to follow for plan of care changes and remain available for any additional DC needs or concerns.      Isael Peters MSW, LSW  Discharge Planner   [phone]

## 2023-03-02 NOTE — PLAN OF CARE
Pt alert and oriented X4. VSS. No co of pain for me today. All meds given per [facility]. Physical therapy worked with pt, rec [facility]. Family at bedside. Fall precautions in place, call light in reach. Pt and family requested no insulin at dinner, mainly is managed with diet.      Problem: Diabetes/Glucose Control  Goal: Glucose maintained within prescribed range  Description: INTERVENTIONS:  - Monitor Blood Glucose as ordered  - Assess for signs and symptoms of hyperglycemia and hypoglycemia  - Administer ordered medications to maintain glucose within target range  - Assess barriers to adequate nutritional intake and initiate nutrition consult as needed  - Instruct patient on self management of diabetes  Outcome: Progressing

## 2023-03-03 LAB
GLUCOSE BLD-MCNC: 131 MG/DL (ref 70–99)
GLUCOSE BLD-MCNC: 151 MG/DL (ref 70–99)
GLUCOSE BLD-MCNC: 192 MG/DL (ref 70–99)
GLUCOSE BLD-MCNC: 221 MG/DL (ref 70–99)
INR BLD: 1.46 (ref 0.85–1.16)
PROTHROMBIN TIME: 17.6 SECONDS (ref 11.6–14.8)

## 2023-03-03 PROCEDURE — 99232 SBSQ HOSP IP/OBS MODERATE 35: CPT | Performed by: INTERNAL MEDICINE

## 2023-03-03 NOTE — PLAN OF CARE
Pt received A&Ox4, Yakut speaking - son at bedside. VSS. RA. Tele A-fib. Afebrile. Pt c/o hip pain radiating to knees, norco given prn. Medications given per MAR. External catheter in place. Call light within reach. Fall precautions in place. Problem: Diabetes/Glucose Control  Goal: Glucose maintained within prescribed range  Description: INTERVENTIONS:  - Monitor Blood Glucose as ordered  - Assess for signs and symptoms of hyperglycemia and hypoglycemia  - Administer ordered medications to maintain glucose within target range  - Assess barriers to adequate nutritional intake and initiate nutrition consult as needed  - Instruct patient on self management of diabetes  Outcome: Progressing     Problem: MUSCULOSKELETAL - ADULT  Goal: Return mobility to safest level of function  Description: INTERVENTIONS:  - Assess patient stability and activity tolerance for standing, transferring and ambulating w/ or w/o assistive devices  - Assist with transfers and ambulation using safe patient handling equipment as needed  - Ensure adequate protection for wounds/incisions during mobilization  - Obtain PT/OT consults as needed  - Advance activity as appropriate  - Communicate ordered activity level and limitations with patient/family  Outcome: Progressing     Problem: NEUROLOGICAL - ADULT  Goal: Achieves stable or improved neurological status  Description: INTERVENTIONS  - Assess for and report changes in neurological status  - Initiate measures to prevent increased intracranial pressure  - Maintain blood pressure and fluid volume within ordered parameters to optimize cerebral perfusion and minimize risk of hemorrhage  - Monitor temperature, glucose, and sodium.  Initiate appropriate interventions as ordered  Outcome: Progressing     Problem: PAIN - ADULT  Goal: Verbalizes/displays adequate comfort level or patient's stated pain goal  Description: INTERVENTIONS:  - Encourage pt to monitor pain and request assistance  - Assess pain using appropriate pain scale  - Administer analgesics based on type and severity of pain and evaluate response  - Implement non-pharmacological measures as appropriate and evaluate response  - Consider cultural and social influences on pain and pain management  - Manage/alleviate anxiety  - Utilize distraction and/or relaxation techniques  - Monitor for opioid side effects  - Notify MD/LIP if interventions unsuccessful or patient reports new pain  - Anticipate increased pain with activity and pre-medicate as appropriate  Outcome: Progressing

## 2023-03-03 NOTE — DISCHARGE INSTRUCTIONS
If you wish to establish care for your cancer at [facility], please call [phone] and ask for appointment with Dr. Arcenio Doshi or Dr. Alberto Lindo. Monitor INR for goal 2-3.   INR check on 3/11/23

## 2023-03-03 NOTE — CM/SW NOTE
SW met with patient and his daughter at bedside to discuss [facility] choice list.     Daughter, Daniela Walsh, is concerned about patient going to [facility] due to patient being immunocompromised with his Cancer. She reported that she would speak with her brother regarding choice list and would notify this SW about choice shortly. SW will continue to follow for patient and family choice. SW will continue to follow for plan of care changes and remain available for any additional DC needs or concerns.      Owen Orlando MSW, LSW  Discharge Planner   [phone]

## 2023-03-04 LAB
GLUCOSE BLD-MCNC: 159 MG/DL (ref 70–99)
GLUCOSE BLD-MCNC: 175 MG/DL (ref 70–99)
GLUCOSE BLD-MCNC: 200 MG/DL (ref 70–99)
GLUCOSE BLD-MCNC: 223 MG/DL (ref 70–99)
INR BLD: 1.34 (ref 0.85–1.16)
PROTHROMBIN TIME: 16.6 SECONDS (ref 11.6–14.8)

## 2023-03-04 PROCEDURE — 99232 SBSQ HOSP IP/OBS MODERATE 35: CPT | Performed by: INTERNAL MEDICINE

## 2023-03-04 RX ORDER — GABAPENTIN 100 MG/1
200 CAPSULE ORAL 3 TIMES DAILY
Qty: 90 CAPSULE | Refills: 1 | Status: SHIPPED | OUTPATIENT
Start: 2023-03-04

## 2023-03-04 RX ORDER — HYDROCODONE BITARTRATE AND ACETAMINOPHEN 5; 325 MG/1; MG/1
1 TABLET ORAL EVERY 4 HOURS PRN
Qty: 20 TABLET | Refills: 0 | Status: SHIPPED | OUTPATIENT
Start: 2023-03-04

## 2023-03-04 NOTE — PLAN OF CARE
Problem: Diabetes/Glucose Control  Goal: Glucose maintained within prescribed range  Description: INTERVENTIONS:  - Monitor Blood Glucose as ordered  - Assess for signs and symptoms of hyperglycemia and hypoglycemia  - Administer ordered medications to maintain glucose within target range  - Assess barriers to adequate nutritional intake and initiate nutrition consult as needed  - Instruct patient on self management of diabetes  3/3/2023 1950 by Marilou Prado RN  Outcome: Progressing  3/3/2023 1911 by Marilou Prado RN  Outcome: Progressing     Problem: MUSCULOSKELETAL - ADULT  Goal: Return mobility to safest level of function  Description: INTERVENTIONS:  - Assess patient stability and activity tolerance for standing, transferring and ambulating w/ or w/o assistive devices  - Assist with transfers and ambulation using safe patient handling equipment as needed  - Ensure adequate protection for wounds/incisions during mobilization  - Obtain PT/OT consults as needed  - Advance activity as appropriate  - Communicate ordered activity level and limitations with patient/family  3/3/2023 1950 by Marilou Prado RN  Outcome: Not Progressing  3/3/2023 1911 by Marilou Prado RN  Outcome: Progressing     Problem: NEUROLOGICAL - ADULT  Goal: Achieves stable or improved neurological status  Description: INTERVENTIONS  - Assess for and report changes in neurological status  - Initiate measures to prevent increased intracranial pressure  - Maintain blood pressure and fluid volume within ordered parameters to optimize cerebral perfusion and minimize risk of hemorrhage  - Monitor temperature, glucose, and sodium. Initiate appropriate interventions as ordered  3/3/2023 1950 by Marilou Prado RN  Outcome: Progressing  3/3/2023 1911 by Marilou Prado RN  Outcome: Progressing  Medicated x1 for c/o low backpain Has fair appetite,No s/s of diabetic reaction,  Awaiting for [facility] placement, Assisted needs.

## 2023-03-04 NOTE — PLAN OF CARE
Patient alert and oriented x4. Pashto speaking, Son at bedside to translate. Online Language translation available. Afebrile. Room air. No SOB. Tele Afib, on Coumadin. Complained of low back pain, PRN Norco given with good relief. On PO Chemo 4x/daily (own meds). Blood glucose monitored, no Insulin coverage given per MAR. Patient on max assist to be able sit on the edge of bed. Incontinent of bladder. On Primofit. Fall precautions in place. Call light in reach. Needs attended. Son at bedside overnight. Problem: MUSCULOSKELETAL - ADULT  Goal: Return mobility to safest level of function  Description: INTERVENTIONS:  - Assess patient stability and activity tolerance for standing, transferring and ambulating w/ or w/o assistive devices  - Assist with transfers and ambulation using safe patient handling equipment as needed  - Ensure adequate protection for wounds/incisions during mobilization  - Obtain PT/OT consults as needed  - Advance activity as appropriate  - Communicate ordered activity level and limitations with patient/family  Outcome: Progressing     Problem: NEUROLOGICAL - ADULT  Goal: Achieves stable or improved neurological status  Description: INTERVENTIONS  - Assess for and report changes in neurological status  - Initiate measures to prevent increased intracranial pressure  - Maintain blood pressure and fluid volume within ordered parameters to optimize cerebral perfusion and minimize risk of hemorrhage  - Monitor temperature, glucose, and sodium.  Initiate appropriate interventions as ordered  Outcome: Progressing     Problem: PAIN - ADULT  Goal: Verbalizes/displays adequate comfort level or patient's stated pain goal  Description: INTERVENTIONS:  - Encourage pt to monitor pain and request assistance  - Assess pain using appropriate pain scale  - Administer analgesics based on type and severity of pain and evaluate response  - Implement non-pharmacological measures as appropriate and evaluate response  - Consider cultural and social influences on pain and pain management  - Manage/alleviate anxiety  - Utilize distraction and/or relaxation techniques  - Monitor for opioid side effects  - Notify MD/LIP if interventions unsuccessful or patient reports new pain  - Anticipate increased pain with activity and pre-medicate as appropriate  Outcome: Progressing

## 2023-03-04 NOTE — CM/SW NOTE
Received call from son - family [facility] [address] [facility]- they preferred [facility] but were told that pt would not receive PT there due to hie Medicaid. Sw re-opened referral and messaged [facility] to see if they will provide PT under his Medicaid plan. Family also asking about [facility].  Needs PT/ OT to see pt to see if they feel he would be appropriate for Acute Rehab.   Sent message to RN to request PT/OT to re-eval.    Deyanira Vu LCSW  /Discharge Planner

## 2023-03-05 LAB
GLUCOSE BLD-MCNC: 165 MG/DL (ref 70–99)
GLUCOSE BLD-MCNC: 171 MG/DL (ref 70–99)
GLUCOSE BLD-MCNC: 172 MG/DL (ref 70–99)
GLUCOSE BLD-MCNC: 240 MG/DL (ref 70–99)
INR BLD: 1.45 (ref 0.85–1.16)
PROTHROMBIN TIME: 17.6 SECONDS (ref 11.6–14.8)

## 2023-03-05 PROCEDURE — 99232 SBSQ HOSP IP/OBS MODERATE 35: CPT | Performed by: INTERNAL MEDICINE

## 2023-03-05 RX ORDER — WARFARIN SODIUM 5 MG/1
10 TABLET ORAL
Status: DISCONTINUED | OUTPATIENT
Start: 2023-03-05 | End: 2023-03-05

## 2023-03-05 RX ORDER — OXYCODONE AND ACETAMINOPHEN 10; 325 MG/1; MG/1
1 TABLET ORAL EVERY 4 HOURS PRN
Status: DISCONTINUED | OUTPATIENT
Start: 2023-03-05 | End: 2023-03-10

## 2023-03-05 RX ORDER — WARFARIN SODIUM 5 MG/1
10 TABLET ORAL NIGHTLY
Status: DISCONTINUED | OUTPATIENT
Start: 2023-03-05 | End: 2023-03-05

## 2023-03-05 RX ORDER — METOPROLOL SUCCINATE 25 MG/1
25 TABLET, EXTENDED RELEASE ORAL
Status: DISCONTINUED | OUTPATIENT
Start: 2023-03-06 | End: 2023-03-10

## 2023-03-05 NOTE — CONSULTS
[facility] Dosing Service  Warfarin (Coumadin) Initial Dosing    Chayo Tapia is a 74year old patient for whom pharmacy has been consulted to dose warfarin (COUMADIN) for  AFib  by Dr. Ronnie Gaytan. Based on this indication, goal INR is 2-3. Pertinent Patient Medical History:  Arrythmia, Mitral stenosis  Potential Drug Interactions:      Latest INR:   Recent Labs     03/05/23  0732   INR 1.45*       Other Anticoagulants:  none  Home regimen (if applicable): Warfarin 7.5 mg qhs  Date/Time last dose was given (if applicable): 7.5 mg @6/61    Based on above -  1. For today, Give warfarin (COUMADIN) 7.5 mg at 2100 tonight    2. PT/INR ordered daily while on warfarin    3. Pharmacy will continue to follow. We appreciate the opportunity to assist in the care of this patient.     CORBY Richardson [facility]  3/5/2023  2:23 PM

## 2023-03-05 NOTE — PLAN OF CARE
Patient alert and oriented X3. Afebrile Room air. No SOB. PRN pain medication given for low back pain with good relief. No nausea and vomiting. Blood glucose monitored, insulin coverage given per MAR. Patient with Hx of AFIB, on coumadin. Max assist. Fall precaution in place. Call light in reach. Needs attended. Son at bedside overnight.       Problem: Diabetes/Glucose Control  Goal: Glucose maintained within prescribed range  Description: INTERVENTIONS:  - Monitor Blood Glucose as ordered  - Assess for signs and symptoms of hyperglycemia and hypoglycemia  - Administer ordered medications to maintain glucose within target range  - Assess barriers to adequate nutritional intake and initiate nutrition consult as needed  - Instruct patient on self management of diabetes  Outcome: Progressing     Problem: MUSCULOSKELETAL - ADULT  Goal: Return mobility to safest level of function  Description: INTERVENTIONS:  - Assess patient stability and activity tolerance for standing, transferring and ambulating w/ or w/o assistive devices  - Assist with transfers and ambulation using safe patient handling equipment as needed  - Ensure adequate protection for wounds/incisions during mobilization  - Obtain PT/OT consults as needed  - Advance activity as appropriate  - Communicate ordered activity level and limitations with patient/family  Outcome: Progressing     Problem: PAIN - ADULT  Goal: Verbalizes/displays adequate comfort level or patient's stated pain goal  Description: INTERVENTIONS:  - Encourage pt to monitor pain and request assistance  - Assess pain using appropriate pain scale  - Administer analgesics based on type and severity of pain and evaluate response  - Implement non-pharmacological measures as appropriate and evaluate response  - Consider cultural and social influences on pain and pain management  - Manage/alleviate anxiety  - Utilize distraction and/or relaxation techniques  - Monitor for opioid side effects  - Notify MD/LIP if interventions unsuccessful or patient reports new pain  - Anticipate increased pain with activity and pre-medicate as appropriate  Outcome: Progressing

## 2023-03-05 NOTE — PROGRESS NOTES
Has fair appetite,Medicated for pain,Shed meds given. Up in chair w/ stand by assist,Son at bedside and assist w/ care,Pt to re evaluate tomorrow for acute rehab,Assisted needs.

## 2023-03-06 ENCOUNTER — APPOINTMENT (OUTPATIENT)
Dept: ULTRASOUND IMAGING | Facility: HOSPITAL | Age: 68
End: 2023-03-06
Attending: INTERNAL MEDICINE
Payer: MEDICAID

## 2023-03-06 LAB
ANION GAP SERPL CALC-SCNC: 6 MMOL/L (ref 0–18)
BUN BLD-MCNC: 19 MG/DL (ref 7–18)
CALCIUM BLD-MCNC: 9.1 MG/DL (ref 8.5–10.1)
CHLORIDE SERPL-SCNC: 104 MMOL/L (ref 98–112)
CO2 SERPL-SCNC: 25 MMOL/L (ref 21–32)
CREAT BLD-MCNC: 0.74 MG/DL
ERYTHROCYTE [DISTWIDTH] IN BLOOD BY AUTOMATED COUNT: 12.3 %
GFR SERPLBLD BASED ON 1.73 SQ M-ARVRAT: 101 ML/MIN/1.73M2 (ref 60–?)
GLUCOSE BLD-MCNC: 152 MG/DL (ref 70–99)
GLUCOSE BLD-MCNC: 167 MG/DL (ref 70–99)
GLUCOSE BLD-MCNC: 191 MG/DL (ref 70–99)
GLUCOSE BLD-MCNC: 206 MG/DL (ref 70–99)
GLUCOSE BLD-MCNC: 253 MG/DL (ref 70–99)
HCT VFR BLD AUTO: 37.9 %
HGB BLD-MCNC: 12 G/DL
INR BLD: 1.65 (ref 0.85–1.16)
MAGNESIUM SERPL-MCNC: 2 MG/DL (ref 1.6–2.6)
MCH RBC QN AUTO: 27.3 PG (ref 26–34)
MCHC RBC AUTO-ENTMCNC: 31.7 G/DL (ref 31–37)
MCV RBC AUTO: 86.1 FL
NT-PROBNP SERPL-MCNC: 415 PG/ML (ref ?–125)
OSMOLALITY SERPL CALC.SUM OF ELEC: 287 MOSM/KG (ref 275–295)
PLATELET # BLD AUTO: 322 10(3)UL (ref 150–450)
POTASSIUM SERPL-SCNC: 4.1 MMOL/L (ref 3.5–5.1)
PROTHROMBIN TIME: 19.4 SECONDS (ref 11.6–14.8)
RBC # BLD AUTO: 4.4 X10(6)UL
SODIUM SERPL-SCNC: 135 MMOL/L (ref 136–145)
WBC # BLD AUTO: 8 X10(3) UL (ref 4–11)

## 2023-03-06 PROCEDURE — 93970 EXTREMITY STUDY: CPT | Performed by: INTERNAL MEDICINE

## 2023-03-06 PROCEDURE — 99232 SBSQ HOSP IP/OBS MODERATE 35: CPT | Performed by: INTERNAL MEDICINE

## 2023-03-06 NOTE — PROGRESS NOTES
Pt is alert and oriented and speaks French. Pt is weak and has son at bedside. Pt has no complaints of pain. Pt has call light in reach and safety measures in place.

## 2023-03-06 NOTE — CM/SW NOTE
MALLORY noted PMR consult verdict was a recommendation for Acute Rehab. MALLORY sent referral to Acute Rehab's via [facility] and will follow for list of accepting. MALLORY will continue to follow for plan of care changes and remain available for any additional DC needs or concerns.      Rebeca Davis MSW, LSW  Discharge Planner   [phone]

## 2023-03-06 NOTE — PLAN OF CARE
Received pt alert and orientated x3-4. Korean speaking, family at the bedside. VSS. No sob on RA. Afebrile. C/o pain, PRN given with good relief. Worked with physical therapy. Voiding. Tolerating diet. Fall precautions in place. Call light within reach.       Problem: Diabetes/Glucose Control  Goal: Glucose maintained within prescribed range  Description: INTERVENTIONS:  - Monitor Blood Glucose as ordered  - Assess for signs and symptoms of hyperglycemia and hypoglycemia  - Administer ordered medications to maintain glucose within target range  - Assess barriers to adequate nutritional intake and initiate nutrition consult as needed  - Instruct patient on self management of diabetes  Outcome: Progressing     Problem: MUSCULOSKELETAL - ADULT  Goal: Return mobility to safest level of function  Description: INTERVENTIONS:  - Assess patient stability and activity tolerance for standing, transferring and ambulating w/ or w/o assistive devices  - Assist with transfers and ambulation using safe patient handling equipment as needed  - Ensure adequate protection for wounds/incisions during mobilization  - Obtain PT/OT consults as needed  - Advance activity as appropriate  - Communicate ordered activity level and limitations with patient/family  Outcome: Progressing

## 2023-03-06 NOTE — CONSULTS
120 [facility] Dosing Service  Warfarin (Coumadin) Subsequent Dosing    Mouna Espino is a 74year old patient for whom pharmacy is dosing warfarin (Coumadin). Goal INR is 2-3    Recent Labs   Lab 03/01/23  1210 03/03/23  0626 03/04/23  0546 03/05/23  0732 03/06/23  0621   INR 2.80* 1.46* 1.34* 1.45* 1.65*       Consulted by:  Dr. Georgana Angelucci  Indication:  afib  Potential Drug Interactions:  none  Other Anticoagulants:  none  Home regimen (if applicable):  warfarin 7.5 mg qhs    Inpatient Dosing History:    Date 3/5 3/6       INR 1.45 1.65       Coumadin dose 7.5 mg                 Based on above -  1. For today, Give warfarin (COUMADIN) 7.5 mg at 2100 tonight  2   PT/INR ordered daily while on warfarin  3. Pharmacy will continue to follow. We appreciate the opportunity to assist in the care of this patient.     Pili Beckett, PharmD  3/6/2023  10:39 AM

## 2023-03-07 ENCOUNTER — APPOINTMENT (OUTPATIENT)
Dept: CV DIAGNOSTICS | Facility: HOSPITAL | Age: 68
End: 2023-03-07
Attending: INTERNAL MEDICINE
Payer: MEDICAID

## 2023-03-07 LAB
GLUCOSE BLD-MCNC: 166 MG/DL (ref 70–99)
GLUCOSE BLD-MCNC: 187 MG/DL (ref 70–99)
GLUCOSE BLD-MCNC: 200 MG/DL (ref 70–99)
GLUCOSE BLD-MCNC: 228 MG/DL (ref 70–99)
INR BLD: 1.54 (ref 0.85–1.16)
PROTHROMBIN TIME: 18.4 SECONDS (ref 11.6–14.8)

## 2023-03-07 PROCEDURE — 93306 TTE W/DOPPLER COMPLETE: CPT | Performed by: INTERNAL MEDICINE

## 2023-03-07 PROCEDURE — 99232 SBSQ HOSP IP/OBS MODERATE 35: CPT | Performed by: INTERNAL MEDICINE

## 2023-03-07 RX ORDER — WARFARIN SODIUM 2 MG/1
8 TABLET ORAL
Status: DISCONTINUED | OUTPATIENT
Start: 2023-03-07 | End: 2023-03-07

## 2023-03-07 RX ORDER — OXYCODONE AND ACETAMINOPHEN 10; 325 MG/1; MG/1
1-2 TABLET ORAL EVERY 4 HOURS PRN
Qty: 30 TABLET | Refills: 0 | Status: SHIPPED | OUTPATIENT
Start: 2023-03-07

## 2023-03-07 RX ORDER — WARFARIN SODIUM 5 MG/1
10 TABLET ORAL
Status: COMPLETED | OUTPATIENT
Start: 2023-03-07 | End: 2023-03-07

## 2023-03-07 RX ORDER — METOPROLOL SUCCINATE 25 MG/1
25 TABLET, EXTENDED RELEASE ORAL
Qty: 90 TABLET | Refills: 2 | Status: SHIPPED | OUTPATIENT
Start: 2023-03-08

## 2023-03-07 NOTE — CONSULTS
120 [facility] Dosing Service  Warfarin (Coumadin) Subsequent Dosing    Michelle Da Silva is a 74year old patient for whom pharmacy is dosing warfarin (Coumadin). Goal INR is 2-3    Recent Labs   Lab 03/03/23  0626 03/04/23  0546 03/05/23  0732 03/06/23  0621 03/07/23  0852   INR 1.46* 1.34* 1.45* 1.65* 1.54*       Consulted by: Dr. Gisell Mcmullen  Indication: a-fib  Potential Drug Interactions: none  Other Anticoagulants: none   Home regimen (if applicable): 7.5 mg qhs    Inpatient Dosing History:     Date 3/5 3/6  3/7         INR 1.45 1.65  1.54         Coumadin dose 7.5 mg  7.5 mg  8 mg                  Based on above -  1. For today, Give warfarin (COUMADIN) 8 mg at 2100 tonight  2   PT/INR ordered daily while on warfarin  3. Pharmacy will continue to follow. We appreciate the opportunity to assist in the care of this patient.     Linda Oliveira, PharmD  3/7/2023  10:43 AM

## 2023-03-07 NOTE — PLAN OF CARE
Pt. A&O x4. VSS. Family at the bedside to translate. Pt. C/o pain; PRN pain medication given per [facility] with relief. US doppler of BLE completed; negative for DVT. Pt. Sitting in the chair for most of the day; able to ambulate with assist with the walker. Call light within reach. Will continue to monitor. Problem: Diabetes/Glucose Control  Goal: Glucose maintained within prescribed range  Description: INTERVENTIONS:  - Monitor Blood Glucose as ordered  - Assess for signs and symptoms of hyperglycemia and hypoglycemia  - Administer ordered medications to maintain glucose within target range  - Assess barriers to adequate nutritional intake and initiate nutrition consult as needed  - Instruct patient on self management of diabetes  Outcome: Progressing     Problem: MUSCULOSKELETAL - ADULT  Goal: Return mobility to safest level of function  Description: INTERVENTIONS:  - Assess patient stability and activity tolerance for standing, transferring and ambulating w/ or w/o assistive devices  - Assist with transfers and ambulation using safe patient handling equipment as needed  - Ensure adequate protection for wounds/incisions during mobilization  - Obtain PT/OT consults as needed  - Advance activity as appropriate  - Communicate ordered activity level and limitations with patient/family  Outcome: Progressing     Problem: NEUROLOGICAL - ADULT  Goal: Achieves stable or improved neurological status  Description: INTERVENTIONS  - Assess for and report changes in neurological status  - Initiate measures to prevent increased intracranial pressure  - Maintain blood pressure and fluid volume within ordered parameters to optimize cerebral perfusion and minimize risk of hemorrhage  - Monitor temperature, glucose, and sodium.  Initiate appropriate interventions as ordered  Outcome: Progressing     Problem: PAIN - ADULT  Goal: Verbalizes/displays adequate comfort level or patient's stated pain goal  Description: INTERVENTIONS:  - Encourage pt to monitor pain and request assistance  - Assess pain using appropriate pain scale  - Administer analgesics based on type and severity of pain and evaluate response  - Implement non-pharmacological measures as appropriate and evaluate response  - Consider cultural and social influences on pain and pain management  - Manage/alleviate anxiety  - Utilize distraction and/or relaxation techniques  - Monitor for opioid side effects  - Notify MD/LIP if interventions unsuccessful or patient reports new pain  - Anticipate increased pain with activity and pre-medicate as appropriate  Outcome: Progressing

## 2023-03-07 NOTE — CM/SW NOTE
Celsa spoke to son today re: dc plans. [facility] did eval and approved pt for acute rehab but [facility] is out of network. Additional Acute Rehab referrals sent and [facility] is reviewing for possible admission. Explained to son that once [facility] approves they will need to get insurance auth also. Advised that he have a back up plan  Of [facility] choice picked out also. Son states pt is eligible [place] but [place]. He did apply for Medicare for pt but this may take at least 30 days.     Oscar Erickson LCSW  /Discharge Planner

## 2023-03-07 NOTE — CM/SW NOTE
MALLORY spoke to [facility]- their physiatrist is reviewing pt and would like updated therapy notes-sw sent message to PT. [facility] liaison states therapy notes not currently supporting need for acute rehab so would most likely not be able to get insurance auth. They will send request for updates in aidin. Mallory updates son that likely will not get approval for acute rehab- no other acute rehabs are even considering pt. Provided son an updated SNF list and pointed out the ones that would consider doing supportive therapy ( 3x week ). Pt's insurance does not cover therapy in a nursing home. Advised that son have SNF choice picked out as back up plans. Son also updated that pt has dc orders.       Itzel Busch LCSW  /Discharge Planner

## 2023-03-08 LAB
GLUCOSE BLD-MCNC: 139 MG/DL (ref 70–99)
GLUCOSE BLD-MCNC: 176 MG/DL (ref 70–99)
GLUCOSE BLD-MCNC: 206 MG/DL (ref 70–99)
GLUCOSE BLD-MCNC: 213 MG/DL (ref 70–99)
INR BLD: 1.62 (ref 0.85–1.16)
PROTHROMBIN TIME: 19.1 SECONDS (ref 11.6–14.8)
SARS-COV-2 RNA RESP QL NAA+PROBE: NOT DETECTED

## 2023-03-08 PROCEDURE — 99232 SBSQ HOSP IP/OBS MODERATE 35: CPT | Performed by: HOSPITALIST

## 2023-03-08 RX ORDER — WARFARIN SODIUM 7.5 MG/1
10 TABLET ORAL DAILY
Refills: 0 | Status: SHIPPED | COMMUNITY
Start: 2023-03-08 | End: 2023-03-10

## 2023-03-08 RX ORDER — WARFARIN SODIUM 5 MG/1
10 TABLET ORAL
Status: COMPLETED | OUTPATIENT
Start: 2023-03-08 | End: 2023-03-08

## 2023-03-08 NOTE — CONSULTS
120 [facility] Dosing Service  Warfarin (Coumadin) Subsequent Dosing    Aníbal Martinez is a 74year old patient for whom pharmacy is dosing warfarin (Coumadin). Goal INR is 2-3    Recent Labs   Lab 03/04/23  0546 03/05/23  0732 03/06/23  0621 03/07/23  0852 03/08/23  0635   INR 1.34* 1.45* 1.65* 1.54* 1.62*       Consulted by:  Dr. Carmelo Og  Indication:  afib  Potential Drug Interactions:  none  Other Anticoagulants:  none  Home regimen (if applicable):  warfarin 7.5 mg qhs    Inpatient Dosing History:    Date 3/5 3/6 3/7 3/8     INR 1.45 1.65 1.54 1.62     Coumadin dose 7.5 mg 7.5 mg 10 mg               Based on above -  1. For today, Give warfarin (COUMADIN) 10 mg at 2100 tonight  2   PT/INR ordered daily while on warfarin  3. Pharmacy will continue to follow. We appreciate the opportunity to assist in the care of this patient.     Sameer Bell, PharmD  3/8/2023  11:36 AM

## 2023-03-08 NOTE — CM/SW NOTE
SW received notice from [facility] in [facility] that they are able to accept patient and will submit to patient's insurance for authorization if family is agreeable. MALLORY spoke with patient's son Moreno Erazo who reported that he is agreeable. SW reserved [facility] in [address] [place] [address] and son will continue touring [facility] facilities in case backup placement is needed. SW will continue to follow for plan of care changes and remain available for any additional DC needs or concerns.      Laura Marks MSW, LSW  Discharge Planner   [phone]

## 2023-03-08 NOTE — PLAN OF CARE
Received pt alert and orientated x3-4. Korean speaking, family at the bedside. VSS. No sob on RA. Afebrile. C/o pain, PRN given with good relief. Worked with physical therapy. Up and walking in the halls, sitting up in the chair. Blood sugars covered with insulin per MAR. Voiding. Tolerating diet. Fall precautions in place. Call light within reach.     Problem: Diabetes/Glucose Control  Goal: Glucose maintained within prescribed range  Description: INTERVENTIONS:  - Monitor Blood Glucose as ordered  - Assess for signs and symptoms of hyperglycemia and hypoglycemia  - Administer ordered medications to maintain glucose within target range  - Assess barriers to adequate nutritional intake and initiate nutrition consult as needed  - Instruct patient on self management of diabetes  Outcome: Progressing     Problem: MUSCULOSKELETAL - ADULT  Goal: Return mobility to safest level of function  Description: INTERVENTIONS:  - Assess patient stability and activity tolerance for standing, transferring and ambulating w/ or w/o assistive devices  - Assist with transfers and ambulation using safe patient handling equipment as needed  - Ensure adequate protection for wounds/incisions during mobilization  - Obtain PT/OT consults as needed  - Advance activity as appropriate  - Communicate ordered activity level and limitations with patient/family  Outcome: Progressing     Problem: PAIN - ADULT  Goal: Verbalizes/displays adequate comfort level or patient's stated pain goal  Description: INTERVENTIONS:  - Encourage pt to monitor pain and request assistance  - Assess pain using appropriate pain scale  - Administer analgesics based on type and severity of pain and evaluate response  - Implement non-pharmacological measures as appropriate and evaluate response  - Consider cultural and social influences on pain and pain management  - Manage/alleviate anxiety  - Utilize distraction and/or relaxation techniques  - Monitor for opioid side effects  - Notify MD/LIP if interventions unsuccessful or patient reports new pain  - Anticipate increased pain with activity and pre-medicate as appropriate  Outcome: Progressing

## 2023-03-08 NOTE — PLAN OF CARE
Received pt a/o x4. VSS. Afebrile. Icelandic speaking, family at bedside to translate per pt request. Pt c/o LE pain, PRN given with relief. Up to chair this shift. Tolerating diet. Family questions answered & updated on POC. Call light within reach. Safety precautions in place.      Problem: Diabetes/Glucose Control  Goal: Glucose maintained within prescribed range  Description: INTERVENTIONS:  - Monitor Blood Glucose as ordered  - Assess for signs and symptoms of hyperglycemia and hypoglycemia  - Administer ordered medications to maintain glucose within target range  - Assess barriers to adequate nutritional intake and initiate nutrition consult as needed  - Instruct patient on self management of diabetes  Outcome: Progressing     Problem: MUSCULOSKELETAL - ADULT  Goal: Return mobility to safest level of function  Description: INTERVENTIONS:  - Assess patient stability and activity tolerance for standing, transferring and ambulating w/ or w/o assistive devices  - Assist with transfers and ambulation using safe patient handling equipment as needed  - Ensure adequate protection for wounds/incisions during mobilization  - Obtain PT/OT consults as needed  - Advance activity as appropriate  - Communicate ordered activity level and limitations with patient/family  Outcome: Progressing     Problem: PAIN - ADULT  Goal: Verbalizes/displays adequate comfort level or patient's stated pain goal  Description: INTERVENTIONS:  - Encourage pt to monitor pain and request assistance  - Assess pain using appropriate pain scale  - Administer analgesics based on type and severity of pain and evaluate response  - Implement non-pharmacological measures as appropriate and evaluate response  - Consider cultural and social influences on pain and pain management  - Manage/alleviate anxiety  - Utilize distraction and/or relaxation techniques  - Monitor for opioid side effects  - Notify MD/LIP if interventions unsuccessful or patient reports new pain  - Anticipate increased pain with activity and pre-medicate as appropriate  Outcome: Progressing

## 2023-03-08 NOTE — CM/SW NOTE
MALLORY sent updated PT and OT notes to [facility] for their review. MALLORY will continue to follow for acceptance and insurance authorization. MALLORY will continue to follow for plan of care changes and remain available for any additional DC needs or concerns.      Monika TATE, AARONW  Discharge Planner   [phone]

## 2023-03-08 NOTE — CM/SW NOTE
SW messaged PT and OT to request early morning eval for patient to assist with DC planning. SW will continue to follow for plan of care changes and remain available for any additional DC needs or concerns.      Javier Camacho MSW, LSW  Discharge Planner   [phone]

## 2023-03-08 NOTE — PROGRESS NOTES
Talked with Dr. Mei Mitchell concerning the Decadron at discharge.  Per MD will keep 4mg BID at discharge and will have patient follow with hemo/onc at d/c

## 2023-03-09 LAB
GLUCOSE BLD-MCNC: 130 MG/DL (ref 70–99)
GLUCOSE BLD-MCNC: 177 MG/DL (ref 70–99)
GLUCOSE BLD-MCNC: 188 MG/DL (ref 70–99)
GLUCOSE BLD-MCNC: 258 MG/DL (ref 70–99)
INR BLD: 1.98 (ref 0.85–1.16)
PROTHROMBIN TIME: 22.3 SECONDS (ref 11.6–14.8)

## 2023-03-09 PROCEDURE — 99497 ADVNCD CARE PLAN 30 MIN: CPT | Performed by: HOSPITALIST

## 2023-03-09 PROCEDURE — 99232 SBSQ HOSP IP/OBS MODERATE 35: CPT | Performed by: HOSPITALIST

## 2023-03-09 NOTE — PLAN OF CARE
Received pt a/o x4. Mongolian speaking, son at bedside to translate. VSS. Afebrile. Pt c/o Lt leg pain, PRN given with relief. Tolerating diet. Plan to discharge to [facility] today. Ambulating within room using walker. Call light within reach. Safety precautions in place.      Problem: Diabetes/Glucose Control  Goal: Glucose maintained within prescribed range  Description: INTERVENTIONS:  - Monitor Blood Glucose as ordered  - Assess for signs and symptoms of hyperglycemia and hypoglycemia  - Administer ordered medications to maintain glucose within target range  - Assess barriers to adequate nutritional intake and initiate nutrition consult as needed  - Instruct patient on self management of diabetes  Outcome: Progressing     Problem: MUSCULOSKELETAL - ADULT  Goal: Return mobility to safest level of function  Description: INTERVENTIONS:  - Assess patient stability and activity tolerance for standing, transferring and ambulating w/ or w/o assistive devices  - Assist with transfers and ambulation using safe patient handling equipment as needed  - Ensure adequate protection for wounds/incisions during mobilization  - Obtain PT/OT consults as needed  - Advance activity as appropriate  - Communicate ordered activity level and limitations with patient/family  Outcome: Progressing     Problem: PAIN - ADULT  Goal: Verbalizes/displays adequate comfort level or patient's stated pain goal  Description: INTERVENTIONS:  - Encourage pt to monitor pain and request assistance  - Assess pain using appropriate pain scale  - Administer analgesics based on type and severity of pain and evaluate response  - Implement non-pharmacological measures as appropriate and evaluate response  - Consider cultural and social influences on pain and pain management  - Manage/alleviate anxiety  - Utilize distraction and/or relaxation techniques  - Monitor for opioid side effects  - Notify MD/LIP if interventions unsuccessful or patient reports new pain  - Anticipate increased pain with activity and pre-medicate as appropriate  Outcome: Progressing

## 2023-03-09 NOTE — CM/SW NOTE
MALLORY verified with [facility] Liaison that patient's insurance authorization for Acute rehab is still pending. SW will continue to follow for plan of care changes and remain available for any additional DC needs or concerns.      Isael Peters MSW, LSW  Discharge Planner   [phone]

## 2023-03-09 NOTE — PLAN OF CARE
Patient is alert, Bengali speaking. Family member at bedside to assist with translating. Patient c/o GARY leg pain - PRN given per MAR. Assist x1 with a walker to the chair for meal, then back to bed. Fall precautions in place, call light within reach, bed alarm on.

## 2023-03-09 NOTE — CONSULTS
[facility] Dosing Service  Warfarin (Coumadin) Subsequent Dosing    Sandor Gilman is a 74year old patient for whom pharmacy is dosing warfarin (Coumadin). Goal INR is 2-3    Recent Labs   Lab 03/05/23  0732 03/06/23  0621 03/07/23  0852 03/08/23  0635 03/09/23  0651   INR 1.45* 1.65* 1.54* 1.62* 1.98*       Consulted by:  Dr. Peter Arteaga  Indication:  afib  Potential Drug Interactions:  none  Other Anticoagulants:  none  Home regimen (if applicable):  warfarin 7.5 mg qhs    Inpatient Dosing History:    Date 3/5 3/6 3/7 3/8 3/9    INR 1.45 1.65 1.54 1.62 1.98    Coumadin dose 7.5mg 7.5 mg 7.5 mg 10 mg 19 mg             Based on above -  1. For today, Give warfarin (COUMADIN) 7.5 mg at 2100 tonight  2   PT/INR ordered daily while on warfarin  3. Pharmacy will continue to follow. We appreciate the opportunity to assist in the care of this patient.     Jeff Downs, PharmD  3/9/2023  1:57 PM

## 2023-03-10 VITALS
HEIGHT: 70 IN | WEIGHT: 273.31 LBS | DIASTOLIC BLOOD PRESSURE: 58 MMHG | BODY MASS INDEX: 39.13 KG/M2 | RESPIRATION RATE: 16 BRPM | OXYGEN SATURATION: 96 % | TEMPERATURE: 98 F | SYSTOLIC BLOOD PRESSURE: 123 MMHG | HEART RATE: 69 BPM

## 2023-03-10 LAB
GLUCOSE BLD-MCNC: 178 MG/DL (ref 70–99)
GLUCOSE BLD-MCNC: 233 MG/DL (ref 70–99)
INR BLD: 1.91 (ref 0.85–1.16)
PROTHROMBIN TIME: 21.7 SECONDS (ref 11.6–14.8)

## 2023-03-10 PROCEDURE — 99239 HOSP IP/OBS DSCHRG MGMT >30: CPT | Performed by: HOSPITALIST

## 2023-03-10 RX ORDER — WARFARIN SODIUM 7.5 MG/1
8 TABLET ORAL DAILY
Refills: 0 | Status: SHIPPED | COMMUNITY
Start: 2023-03-10

## 2023-03-10 RX ORDER — WARFARIN SODIUM 2 MG/1
8 TABLET ORAL
Status: DISCONTINUED | OUTPATIENT
Start: 2023-03-10 | End: 2023-03-10

## 2023-03-10 NOTE — CONSULTS
120 [facility] Dosing Service  Warfarin (Coumadin) Subsequent Dosing    Umang Peters is a 74year old patient for whom pharmacy is dosing warfarin (Coumadin). Goal INR is 2-3    Recent Labs   Lab 03/06/23  0621 03/07/23  0852 03/08/23  0635 03/09/23  0651 03/10/23  0737   INR 1.65* 1.54* 1.62* 1.98* 1.91*       Consulted by: Dr. Malachi Sherman  Indication: a-fib  Potential Drug Interactions: none  Other Anticoagulants: none  Home regimen (if applicable): 7.5 mg qhs    Inpatient Dosing History:     Date 3/5 3/6 3/7 3/8 3/9  3/10   INR 1.45 1.65 1.54 1.62 1.98  1.91   Coumadin dose 7.5mg 7.5 mg 7.5 mg 10 mg 7.5 mg 8 mg             Based on above -  1. For today, Give warfarin (COUMADIN) 8 mg at 2100 tonight  2   PT/INR ordered daily while on warfarin  3. Pharmacy will continue to follow. We appreciate the opportunity to assist in the care of this patient.     Pedrito Chun, PharmD  3/10/2023  11:06 AM

## 2023-03-10 NOTE — PLAN OF CARE
Patient enroutee to [facility] rehab in [facility] via wheelchair/medicare accopanied by 2 transport personnel. Several attempts were made to call [facility] for nursing report but to no avail.either writer's call is unattended or call is transferred miultiple times to different extensions but call is unattended.

## 2023-03-10 NOTE — PLAN OF CARE
Received pt a/o x4. Maori speaking, son at bedside to translate. VSS. Afebrile. Pt c/o Lt leg pain, PRN given with relief. Up to chair & ambulating within room this shift. Awaiting insurance auth, hoping to discharge to rehab today. Call light within reach. Safety precautions in place.      Problem: Diabetes/Glucose Control  Goal: Glucose maintained within prescribed range  Description: INTERVENTIONS:  - Monitor Blood Glucose as ordered  - Assess for signs and symptoms of hyperglycemia and hypoglycemia  - Administer ordered medications to maintain glucose within target range  - Assess barriers to adequate nutritional intake and initiate nutrition consult as needed  - Instruct patient on self management of diabetes  Outcome: Progressing     Problem: PAIN - ADULT  Goal: Verbalizes/displays adequate comfort level or patient's stated pain goal  Description: INTERVENTIONS:  - Encourage pt to monitor pain and request assistance  - Assess pain using appropriate pain scale  - Administer analgesics based on type and severity of pain and evaluate response  - Implement non-pharmacological measures as appropriate and evaluate response  - Consider cultural and social influences on pain and pain management  - Manage/alleviate anxiety  - Utilize distraction and/or relaxation techniques  - Monitor for opioid side effects  - Notify MD/LIP if interventions unsuccessful or patient reports new pain  - Anticipate increased pain with activity and pre-medicate as appropriate  Outcome: Progressing     Problem: MUSCULOSKELETAL - ADULT  Goal: Return mobility to safest level of function  Description: INTERVENTIONS:  - Assess patient stability and activity tolerance for standing, transferring and ambulating w/ or w/o assistive devices  - Assist with transfers and ambulation using safe patient handling equipment as needed  - Ensure adequate protection for wounds/incisions during mobilization  - Obtain PT/OT consults as needed  - Advance activity as appropriate  - Communicate ordered activity level and limitations with patient/family  Outcome: Progressing

## 2023-03-10 NOTE — CM/SW NOTE
SW sent updated clinical information to [facility] and requested immediate notification once insurance authorization is approved. SW will continue to follow for insurance approval and assist with coordinating transfer. SW will continue to follow for plan of care changes and remain available for any additional DC needs or concerns.      Yaakov TATE, LSW  Discharge Planner   [phone]

## 2023-03-10 NOTE — CM/SW NOTE
03/10/23 1000   Discharge disposition   Expected discharge disposition Rehab [address]   Discharge transportation Myra Maria was notified by [facility] that insurance authorization was obtained. Patient has a DC order. SW spoke with patient's son to notify. Pt to transport via [address] patient/family notified transport is not covered by insurance. Pt/family are agreeable to the charges. Transport arranged for 1pm. SW was informed by [facility] that ambulance may be sent with [facility] charges if [address] is not available at that time. Son is aware. Willow Quezada   [phone]    SW will continue to follow for plan of care changes and remain available for any additional DC needs or concerns.      Cherelle Manley MSW, LSW  Discharge Planner   [phone]

## 2023-03-10 NOTE — PHYSICAL THERAPY NOTE
IP PT attempted, RN consulted prior to session and agreeable to pt participating. Pt presents in sidelying, agreeable to participate. Initiated t/f OOB, pt's family member in bathroom requesting to skip PT as pt had been in chair for 2-3hours and is resting.

## 2023-04-11 ENCOUNTER — APPOINTMENT (OUTPATIENT)
Dept: CT IMAGING | Facility: HOSPITAL | Age: 68
End: 2023-04-11
Attending: EMERGENCY MEDICINE
Payer: MEDICARE

## 2023-04-11 ENCOUNTER — HOSPITAL ENCOUNTER (OUTPATIENT)
Facility: HOSPITAL | Age: 68
Setting detail: OBSERVATION
Discharge: HOME HEALTH | End: 2023-04-13
Attending: EMERGENCY MEDICINE | Admitting: HOSPITALIST
Payer: MEDICARE

## 2023-04-11 DIAGNOSIS — Z79.01 ANTICOAGULATED ON COUMADIN: ICD-10-CM

## 2023-04-11 DIAGNOSIS — C61 PROSTATE CANCER METASTATIC TO BONE (HCC): ICD-10-CM

## 2023-04-11 DIAGNOSIS — C61 PROSTATE CARCINOMA (HCC): ICD-10-CM

## 2023-04-11 DIAGNOSIS — R53.1 WEAKNESS: ICD-10-CM

## 2023-04-11 DIAGNOSIS — K92.1 MELENA: ICD-10-CM

## 2023-04-11 DIAGNOSIS — K92.0 HEMATEMESIS WITH NAUSEA: Primary | ICD-10-CM

## 2023-04-11 DIAGNOSIS — C79.51 PROSTATE CANCER METASTATIC TO BONE (HCC): ICD-10-CM

## 2023-04-11 LAB
ALBUMIN SERPL-MCNC: 2.9 G/DL (ref 3.4–5)
ALBUMIN/GLOB SERPL: 0.7 {RATIO} (ref 1–2)
ALP LIVER SERPL-CCNC: 672 U/L
ALT SERPL-CCNC: 37 U/L
ANION GAP SERPL CALC-SCNC: 3 MMOL/L (ref 0–18)
ANTIBODY SCREEN: NEGATIVE
APTT PPP: 101.3 SECONDS (ref 23.3–35.6)
AST SERPL-CCNC: 35 U/L (ref 15–37)
BASOPHILS # BLD AUTO: 0.01 X10(3) UL (ref 0–0.2)
BASOPHILS NFR BLD AUTO: 0.2 %
BILIRUB SERPL-MCNC: 0.4 MG/DL (ref 0.1–2)
BUN BLD-MCNC: 10 MG/DL (ref 7–18)
CALCIUM BLD-MCNC: 7.7 MG/DL (ref 8.5–10.1)
CHLORIDE SERPL-SCNC: 109 MMOL/L (ref 98–112)
CO2 SERPL-SCNC: 24 MMOL/L (ref 21–32)
CREAT BLD-MCNC: 0.64 MG/DL
EOSINOPHIL # BLD AUTO: 0.06 X10(3) UL (ref 0–0.7)
EOSINOPHIL NFR BLD AUTO: 1.4 %
ERYTHROCYTE [DISTWIDTH] IN BLOOD BY AUTOMATED COUNT: 13.5 %
GFR SERPLBLD BASED ON 1.73 SQ M-ARVRAT: 105 ML/MIN/1.73M2 (ref 60–?)
GLOBULIN PLAS-MCNC: 3.9 G/DL (ref 2.8–4.4)
GLUCOSE BLD-MCNC: 152 MG/DL (ref 70–99)
HCT VFR BLD AUTO: 38.6 %
HGB BLD-MCNC: 12.4 G/DL
IMM GRANULOCYTES # BLD AUTO: 0.02 X10(3) UL (ref 0–1)
IMM GRANULOCYTES NFR BLD: 0.5 %
INR BLD: 13.26 (ref 0.85–1.16)
LIPASE SERPL-CCNC: 23 U/L (ref 13–75)
LYMPHOCYTES # BLD AUTO: 0.41 X10(3) UL (ref 1–4)
LYMPHOCYTES NFR BLD AUTO: 9.5 %
MCH RBC QN AUTO: 26.8 PG (ref 26–34)
MCHC RBC AUTO-ENTMCNC: 32.1 G/DL (ref 31–37)
MCV RBC AUTO: 83.4 FL
MONOCYTES # BLD AUTO: 0.35 X10(3) UL (ref 0.1–1)
MONOCYTES NFR BLD AUTO: 8.1 %
NEUTROPHILS # BLD AUTO: 3.47 X10 (3) UL (ref 1.5–7.7)
NEUTROPHILS # BLD AUTO: 3.47 X10(3) UL (ref 1.5–7.7)
NEUTROPHILS NFR BLD AUTO: 80.3 %
OSMOLALITY SERPL CALC.SUM OF ELEC: 284 MOSM/KG (ref 275–295)
PLATELET # BLD AUTO: 231 10(3)UL (ref 150–450)
POTASSIUM SERPL-SCNC: 3.6 MMOL/L (ref 3.5–5.1)
PROT SERPL-MCNC: 6.8 G/DL (ref 6.4–8.2)
PROTHROMBIN TIME: 96.4 SECONDS (ref 11.6–14.8)
RBC # BLD AUTO: 4.63 X10(6)UL
RH BLOOD TYPE: POSITIVE
SARS-COV-2 RNA RESP QL NAA+PROBE: NOT DETECTED
SODIUM SERPL-SCNC: 136 MMOL/L (ref 136–145)
WBC # BLD AUTO: 4.3 X10(3) UL (ref 4–11)

## 2023-04-11 PROCEDURE — 30233N1 TRANSFUSION OF NONAUTOLOGOUS RED BLOOD CELLS INTO PERIPHERAL VEIN, PERCUTANEOUS APPROACH: ICD-10-PCS | Performed by: HOSPITALIST

## 2023-04-11 PROCEDURE — 74177 CT ABD & PELVIS W/CONTRAST: CPT | Performed by: EMERGENCY MEDICINE

## 2023-04-11 PROCEDURE — 99223 1ST HOSP IP/OBS HIGH 75: CPT | Performed by: HOSPITALIST

## 2023-04-11 RX ORDER — FINASTERIDE 5 MG/1
5 TABLET, FILM COATED ORAL DAILY
Status: DISCONTINUED | OUTPATIENT
Start: 2023-04-12 | End: 2023-04-13

## 2023-04-11 RX ORDER — OXYCODONE HYDROCHLORIDE 15 MG/1
15 TABLET ORAL EVERY 6 HOURS PRN
Status: DISCONTINUED | OUTPATIENT
Start: 2023-04-11 | End: 2023-04-13

## 2023-04-11 RX ORDER — ONDANSETRON 2 MG/ML
4 INJECTION INTRAMUSCULAR; INTRAVENOUS ONCE
Status: COMPLETED | OUTPATIENT
Start: 2023-04-11 | End: 2023-04-11

## 2023-04-11 RX ORDER — DEXAMETHASONE 4 MG/1
4 TABLET ORAL EVERY 12 HOURS SCHEDULED
Status: DISCONTINUED | OUTPATIENT
Start: 2023-04-11 | End: 2023-04-12

## 2023-04-11 RX ORDER — MORPHINE SULFATE 15 MG/1
15 TABLET, FILM COATED, EXTENDED RELEASE ORAL EVERY 12 HOURS SCHEDULED
COMMUNITY

## 2023-04-11 RX ORDER — GABAPENTIN 100 MG/1
200 CAPSULE ORAL 3 TIMES DAILY
Status: DISCONTINUED | OUTPATIENT
Start: 2023-04-11 | End: 2023-04-12 | Stop reason: SDUPTHER

## 2023-04-11 RX ORDER — OXYCODONE HYDROCHLORIDE 15 MG/1
15 TABLET ORAL EVERY 6 HOURS PRN
COMMUNITY

## 2023-04-11 RX ORDER — TAMSULOSIN HYDROCHLORIDE 0.4 MG/1
0.4 CAPSULE ORAL DAILY
Status: DISCONTINUED | OUTPATIENT
Start: 2023-04-12 | End: 2023-04-13

## 2023-04-11 RX ORDER — GABAPENTIN 250 MG/5ML
100 SOLUTION ORAL DAILY
COMMUNITY

## 2023-04-11 RX ORDER — ONDANSETRON 2 MG/ML
4 INJECTION INTRAMUSCULAR; INTRAVENOUS ONCE
Status: DISCONTINUED | OUTPATIENT
Start: 2023-04-11 | End: 2023-04-13

## 2023-04-11 RX ORDER — SODIUM CHLORIDE 9 MG/ML
INJECTION, SOLUTION INTRAVENOUS CONTINUOUS
Status: DISCONTINUED | OUTPATIENT
Start: 2023-04-11 | End: 2023-04-12

## 2023-04-11 RX ORDER — MORPHINE SULFATE 15 MG/1
15 TABLET, FILM COATED, EXTENDED RELEASE ORAL EVERY 12 HOURS SCHEDULED
Status: DISCONTINUED | OUTPATIENT
Start: 2023-04-11 | End: 2023-04-13

## 2023-04-11 RX ORDER — METOPROLOL SUCCINATE 25 MG/1
25 TABLET, EXTENDED RELEASE ORAL
Status: DISCONTINUED | OUTPATIENT
Start: 2023-04-12 | End: 2023-04-13

## 2023-04-11 NOTE — ED INITIAL ASSESSMENT (HPI)
Pt presents via EMS c/o N/v/d. Pt states he is vomiting bright red blood. Pt with hx of prostate cancer and currently getting radiation.

## 2023-04-12 LAB
BASOPHILS # BLD AUTO: 0.01 X10(3) UL (ref 0–0.2)
BASOPHILS NFR BLD AUTO: 0.6 %
C DIFF TOX B STL QL: NEGATIVE
EOSINOPHIL # BLD AUTO: 0.05 X10(3) UL (ref 0–0.7)
EOSINOPHIL NFR BLD AUTO: 2.9 %
ERYTHROCYTE [DISTWIDTH] IN BLOOD BY AUTOMATED COUNT: 13.7 %
GLUCOSE BLD-MCNC: 116 MG/DL (ref 70–99)
GLUCOSE BLD-MCNC: 124 MG/DL (ref 70–99)
GLUCOSE BLD-MCNC: 134 MG/DL (ref 70–99)
GLUCOSE BLD-MCNC: 142 MG/DL (ref 70–99)
HCT VFR BLD AUTO: 32.8 %
HGB BLD-MCNC: 10.5 G/DL
IMM GRANULOCYTES # BLD AUTO: 0.01 X10(3) UL (ref 0–1)
IMM GRANULOCYTES NFR BLD: 0.6 %
INR BLD: 1.74 (ref 0.85–1.16)
LDH SERPL L TO P-CCNC: 757 U/L
LYMPHOCYTES # BLD AUTO: 0.35 X10(3) UL (ref 1–4)
LYMPHOCYTES NFR BLD AUTO: 20.1 %
MCH RBC QN AUTO: 26.6 PG (ref 26–34)
MCHC RBC AUTO-ENTMCNC: 32 G/DL (ref 31–37)
MCV RBC AUTO: 83.2 FL
MONOCYTES # BLD AUTO: 0.26 X10(3) UL (ref 0.1–1)
MONOCYTES NFR BLD AUTO: 14.9 %
NEUTROPHILS # BLD AUTO: 1.06 X10 (3) UL (ref 1.5–7.7)
NEUTROPHILS # BLD AUTO: 1.06 X10(3) UL (ref 1.5–7.7)
NEUTROPHILS NFR BLD AUTO: 60.9 %
PLATELET # BLD AUTO: 173 10(3)UL (ref 150–450)
PROTHROMBIN TIME: 20.2 SECONDS (ref 11.6–14.8)
PSA SERPL-MCNC: 86.7 NG/ML (ref ?–4)
RBC # BLD AUTO: 3.94 X10(6)UL
WBC # BLD AUTO: 1.7 X10(3) UL (ref 4–11)

## 2023-04-12 PROCEDURE — 99232 SBSQ HOSP IP/OBS MODERATE 35: CPT | Performed by: INTERNAL MEDICINE

## 2023-04-12 PROCEDURE — 99215 OFFICE O/P EST HI 40 MIN: CPT | Performed by: INTERNAL MEDICINE

## 2023-04-12 RX ORDER — NICOTINE POLACRILEX 4 MG
30 LOZENGE BUCCAL
Status: DISCONTINUED | OUTPATIENT
Start: 2023-04-12 | End: 2023-04-13

## 2023-04-12 RX ORDER — GABAPENTIN 250 MG/5ML
200 SOLUTION ORAL 3 TIMES DAILY
Status: DISCONTINUED | OUTPATIENT
Start: 2023-04-12 | End: 2023-04-13

## 2023-04-12 RX ORDER — ONDANSETRON 2 MG/ML
4 INJECTION INTRAMUSCULAR; INTRAVENOUS EVERY 6 HOURS PRN
Status: DISCONTINUED | OUTPATIENT
Start: 2023-04-12 | End: 2023-04-13

## 2023-04-12 RX ORDER — ACETAMINOPHEN 325 MG/1
650 TABLET ORAL EVERY 6 HOURS PRN
Status: DISCONTINUED | OUTPATIENT
Start: 2023-04-12 | End: 2023-04-13

## 2023-04-12 RX ORDER — DEXAMETHASONE 4 MG/1
4 TABLET ORAL DAILY
Status: DISCONTINUED | OUTPATIENT
Start: 2023-04-12 | End: 2023-04-13

## 2023-04-12 RX ORDER — MELATONIN
3 NIGHTLY PRN
Status: DISCONTINUED | OUTPATIENT
Start: 2023-04-12 | End: 2023-04-13

## 2023-04-12 RX ORDER — DEXTROSE MONOHYDRATE 25 G/50ML
50 INJECTION, SOLUTION INTRAVENOUS
Status: DISCONTINUED | OUTPATIENT
Start: 2023-04-12 | End: 2023-04-13

## 2023-04-12 RX ORDER — NICOTINE POLACRILEX 4 MG
15 LOZENGE BUCCAL
Status: DISCONTINUED | OUTPATIENT
Start: 2023-04-12 | End: 2023-04-13

## 2023-04-12 NOTE — CM/SW NOTE
04/12/23 1300   CM/SW Referral Data   Referral Source Social Work (self-referral)   Reason for Referral Discharge planning   Informant Patient;Daughter   [address]   Patient lives with   (Family)   Discharge Needs   Anticipated D/C needs Home health care     SW spoke with patient and his daughter at bedside. Patient's daughter reported that patient was recently Dc'ed home from [facility] rehab. Patient was doing well at home prior to admission. Patient was sent home with a home Aid at [place] from [facility] who comes out 3 days a week to assist with home care. [facility]     SW sent referral for home health via [facility] and is awaiting list of accepting providers. SW will continue to follow for plan of care changes and remain available for any additional DC needs or concerns.      Maricel Hannon MSW, LSW  Discharge Planner   [phone]

## 2023-04-12 NOTE — PROGRESS NOTES
NURSING ADMISSION NOTE      Patient admitted via Cart  Oriented to room. Safety precautions initiated. Bed in low position. Call light in reach. Alert and oriented x4. Very weak. INR 1.74 after FFPs transfusion. Chronic back/ hip pain. Scheduled MSER with prn oxycodone given as needed. Clear liquid diet, tolerated well. ivf infusing. Incontinent of bladder at times. Fall precaution maintained.

## 2023-04-13 ENCOUNTER — ANESTHESIA (OUTPATIENT)
Dept: ENDOSCOPY | Facility: HOSPITAL | Age: 68
End: 2023-04-13
Payer: MEDICARE

## 2023-04-13 ENCOUNTER — ANESTHESIA EVENT (OUTPATIENT)
Dept: ENDOSCOPY | Facility: HOSPITAL | Age: 68
End: 2023-04-13
Payer: MEDICARE

## 2023-04-13 VITALS
HEART RATE: 59 BPM | OXYGEN SATURATION: 99 % | TEMPERATURE: 98 F | SYSTOLIC BLOOD PRESSURE: 136 MMHG | DIASTOLIC BLOOD PRESSURE: 65 MMHG | RESPIRATION RATE: 16 BRPM

## 2023-04-13 LAB
ALBUMIN SERPL-MCNC: 2.8 G/DL (ref 3.4–5)
ALBUMIN/GLOB SERPL: 0.8 {RATIO} (ref 1–2)
ALK PHOSPHATASE: 588 IU/L
ALP LIVER SERPL-CCNC: 599 U/L
ALT SERPL-CCNC: 42 U/L
ANION GAP SERPL CALC-SCNC: 5 MMOL/L (ref 0–18)
AST SERPL-CCNC: 37 U/L (ref 15–37)
BASOPHILS # BLD AUTO: 0.01 X10(3) UL (ref 0–0.2)
BASOPHILS NFR BLD AUTO: 0.4 %
BILIRUB SERPL-MCNC: 0.6 MG/DL (ref 0.1–2)
BLOOD TYPE BARCODE: 5100
BONE FRAC: 71 %
BUN BLD-MCNC: 6 MG/DL (ref 7–18)
CALCIUM BLD-MCNC: 6.7 MG/DL (ref 8.5–10.1)
CHLORIDE SERPL-SCNC: 109 MMOL/L (ref 98–112)
CO2 SERPL-SCNC: 23 MMOL/L (ref 21–32)
CREAT BLD-MCNC: 0.54 MG/DL
EOSINOPHIL # BLD AUTO: 0.06 X10(3) UL (ref 0–0.7)
EOSINOPHIL NFR BLD AUTO: 2.5 %
ERYTHROCYTE [DISTWIDTH] IN BLOOD BY AUTOMATED COUNT: 13.7 %
GFR SERPLBLD BASED ON 1.73 SQ M-ARVRAT: 111 ML/MIN/1.73M2 (ref 60–?)
GLOBULIN PLAS-MCNC: 3.4 G/DL (ref 2.8–4.4)
GLUCOSE BLD-MCNC: 105 MG/DL (ref 70–99)
GLUCOSE BLD-MCNC: 110 MG/DL (ref 70–99)
GLUCOSE BLD-MCNC: 115 MG/DL (ref 70–99)
HCT VFR BLD AUTO: 34.6 %
HGB BLD-MCNC: 10.9 G/DL
IMM GRANULOCYTES # BLD AUTO: 0.03 X10(3) UL (ref 0–1)
IMM GRANULOCYTES NFR BLD: 1.2 %
INR BLD: 1.2 (ref 0.85–1.16)
INTESTINAL FRAC: 1 %
IRON SATN MFR SERPL: 24 %
IRON SERPL-MCNC: 62 UG/DL
LIVER FRAC: 29 %
LYMPHOCYTES # BLD AUTO: 0.4 X10(3) UL (ref 1–4)
LYMPHOCYTES NFR BLD AUTO: 16.5 %
MCH RBC QN AUTO: 26.7 PG (ref 26–34)
MCHC RBC AUTO-ENTMCNC: 31.5 G/DL (ref 31–37)
MCV RBC AUTO: 84.6 FL
MONOCYTES # BLD AUTO: 0.43 X10(3) UL (ref 0.1–1)
MONOCYTES NFR BLD AUTO: 17.8 %
NEUTROPHILS # BLD AUTO: 1.49 X10 (3) UL (ref 1.5–7.7)
NEUTROPHILS # BLD AUTO: 1.49 X10(3) UL (ref 1.5–7.7)
NEUTROPHILS NFR BLD AUTO: 61.6 %
OSMOLALITY SERPL CALC.SUM OF ELEC: 283 MOSM/KG (ref 275–295)
PLATELET # BLD AUTO: 172 10(3)UL (ref 150–450)
POTASSIUM SERPL-SCNC: 3.5 MMOL/L (ref 3.5–5.1)
POTASSIUM SERPL-SCNC: 3.5 MMOL/L (ref 3.5–5.1)
PROT SERPL-MCNC: 6.2 G/DL (ref 6.4–8.2)
PROTHROMBIN TIME: 15.2 SECONDS (ref 11.6–14.8)
RBC # BLD AUTO: 4.09 X10(6)UL
SODIUM SERPL-SCNC: 137 MMOL/L (ref 136–145)
TIBC SERPL-MCNC: 262 UG/DL (ref 240–450)
TRANSFERRIN SERPL-MCNC: 176 MG/DL (ref 200–360)
UNIT VOLUME: 331 ML
WBC # BLD AUTO: 2.4 X10(3) UL (ref 4–11)

## 2023-04-13 PROCEDURE — 0DB68ZX EXCISION OF STOMACH, VIA NATURAL OR ARTIFICIAL OPENING ENDOSCOPIC, DIAGNOSTIC: ICD-10-PCS | Performed by: STUDENT IN AN ORGANIZED HEALTH CARE EDUCATION/TRAINING PROGRAM

## 2023-04-13 PROCEDURE — 99239 HOSP IP/OBS DSCHRG MGMT >30: CPT | Performed by: INTERNAL MEDICINE

## 2023-04-13 PROCEDURE — 99233 SBSQ HOSP IP/OBS HIGH 50: CPT | Performed by: INTERNAL MEDICINE

## 2023-04-13 RX ORDER — PANTOPRAZOLE SODIUM 40 MG/1
40 TABLET, DELAYED RELEASE ORAL
Qty: 30 TABLET | Refills: 0 | Status: SHIPPED | OUTPATIENT
Start: 2023-04-14

## 2023-04-13 RX ORDER — LIDOCAINE HYDROCHLORIDE 10 MG/ML
INJECTION, SOLUTION EPIDURAL; INFILTRATION; INTRACAUDAL; PERINEURAL AS NEEDED
Status: DISCONTINUED | OUTPATIENT
Start: 2023-04-13 | End: 2023-04-13 | Stop reason: SURG

## 2023-04-13 RX ORDER — ONDANSETRON 4 MG/1
4 TABLET, FILM COATED ORAL ONCE
Status: DISCONTINUED | OUTPATIENT
Start: 2023-04-13 | End: 2023-04-13

## 2023-04-13 RX ORDER — SODIUM CHLORIDE, SODIUM LACTATE, POTASSIUM CHLORIDE, CALCIUM CHLORIDE 600; 310; 30; 20 MG/100ML; MG/100ML; MG/100ML; MG/100ML
INJECTION, SOLUTION INTRAVENOUS CONTINUOUS PRN
Status: DISCONTINUED | OUTPATIENT
Start: 2023-04-13 | End: 2023-04-13 | Stop reason: SURG

## 2023-04-13 RX ORDER — DEXAMETHASONE 4 MG/1
4 TABLET ORAL
Qty: 40 TABLET | Refills: 0 | Status: SHIPPED | COMMUNITY
Start: 2023-04-13

## 2023-04-13 RX ORDER — PANTOPRAZOLE SODIUM 40 MG/1
40 TABLET, DELAYED RELEASE ORAL
Status: DISCONTINUED | OUTPATIENT
Start: 2023-04-13 | End: 2023-04-13

## 2023-04-13 RX ADMIN — LIDOCAINE HYDROCHLORIDE 25 MG: 10 INJECTION, SOLUTION EPIDURAL; INFILTRATION; INTRACAUDAL; PERINEURAL at 09:40:00

## 2023-04-13 RX ADMIN — SODIUM CHLORIDE, SODIUM LACTATE, POTASSIUM CHLORIDE, CALCIUM CHLORIDE: 600; 310; 30; 20 INJECTION, SOLUTION INTRAVENOUS at 09:36:00

## 2023-04-13 NOTE — CM/SW NOTE
Celsa spoke to pt's son re: dc plans. Explained that there are no accepting [place] [address] agencies at this time- pt has [facility]. Will keep [place] [address] referral open in case an agency accepts- doubtful due to this insurance. 52 referrals have been sent out. Son plans on [facility] to see if pt's caregiver services can be increased. Pt is weaker now since getting more radiation as OP. Pt may be switching to new insurance in May per oncology note and then may switch his care to follow with [facility]- currently going to radiation at [facility].     Priyank Flanagan LCSW  /Discharge Planner

## 2023-04-13 NOTE — ANESTHESIA POSTPROCEDURE EVALUATION
[facility] Patient Status:  Observation   Age/Gender 74year old male MRN [id number]   Location [address] [address] Attending Sofia Mack DO   Hosp Day # 0 PCP Gina Keller MD       Anesthesia Post-op Note    ESOPHAGOGASTRODUODENOSCOPY (EGD) with biopsies    Procedure Summary     Date: 04/13/23 Room / Location: [address] [place] [address] ENDOSCOPY 02 / [address] [facility] ENDOSCOPY    Anesthesia Start: 2998 Anesthesia Stop: 1000    Procedure: ESOPHAGOGASTRODUODENOSCOPY (EGD) with biopsies Diagnosis:       Melena      (gastritis)    Surgeons: Dm Narayan MD Anesthesiologist: Stanley Santillan MD    Anesthesia Type: MAC ASA Status: 3          Anesthesia Type: MAC    Vitals Value Taken Time   /63 04/13/23 1007   Temp 98.0 04/13/23 1007   Pulse 70 04/13/23 1007   Resp 16 04/13/23 1007   SpO2 97% 04/13/23 1007       Patient Location: Endoscopy    Anesthesia Type: MAC    Airway Patency: patent    Postop Pain Control: adequate    Mental Status: mildly sedated but able to meaningfully participate in the post-anesthesia evaluation    Nausea/Vomiting: none    Cardiopulmonary/Hydration status: stable euvolemic    Complications: no apparent anesthesia related complications    Postop vital signs: stable    Dental Exam: Unchanged from Preop    Patient to be discharged from PACU when criteria met.

## 2023-04-13 NOTE — CM/SW NOTE
[facility] has accepted pt  [phone]  Reserved in aidin.     JERI WoodyW  /Discharge Planner

## 2023-04-13 NOTE — CM/SW NOTE
04/13/23 1600   Discharge disposition   Expected discharge disposition Home-Health   Post Acute Care Provider   [facility])       [place] updated on plans for [facility] at [place].     Itzel Busch LCSW  /Discharge Planner

## 2023-04-13 NOTE — PLAN OF CARE
Back to the room following EGD w/ Dr Enrike Pham. Awake & alert, appears comfortable. Vital signs stable. Morning meds administered & informed patient that he can go ahead & eat. Dr Radha Cleaning will come see patient again. He was notified that onc & GI has signed off. Spoke w/ son/POA who also requesting patient to be discharged today.
Cardiology was consulted for anticoagulation for afib. Cristian Mas from cardiology called & acknowledged the consult. Patient will be seen by cardiology today. Patient's called again to follow for they want to push for the discharge. He was informed that patient needs to be seen by cardiology prior to discharge.
Discharge summary discussed to patient & son. Instructions given for new meds to take for gastritis & follow ups w/ primary & cardiology. [place] coumadin for now until seen out patient by cardiologist for further instructions/recommendations  on anticoagulation. Anti emetic requested by patient's son. MD will call it in to the pharmacy so they can pick it up tomorrow. Patient's son made aware.
NPO maintained for EGD this am. Patient received asleep. Appeared comfortable. Due Protonix IV administered & IV potassium replacement initiated. Incontinence care rendered Patient able to turn & reposition in bed. Appears fatigued & weak. Vital signs stable.
Nursing report received from endo nurse. Patient is s/p EGD,diagnosis gatritis. BIopsies were taken & patient may resume regular diet. May resume coumadin per GI standpoint. Gi signed off. Sent message to Dr Nadine Diego for updates.
Pt received A&Ox4, VSS, Afebrile, on RA. No c/o pain. Pt denies nausea. 1 loose stool this am. Meds administered per MAR. Family at bedside. Tolerating diet well. Pt to be NPO @ [place]. Fall precautions in place. Call light within reach.
.

## 2023-04-13 NOTE — PROGRESS NOTES
Pt A&Ox4 on room air, VSS, complaints of mild pain scheduled morphine given and gave relief. Tolerated medications well per mar. NPO at midnight, call light within reach, frequent checks made, needs met.

## 2023-04-13 NOTE — PHYSICAL THERAPY NOTE
Physical therapy consult requested. Attempted to evaluate pt at this time, but RN reports that pt has just returned from EGD and is still recovering. Pt not appropriate to participate in therapy evaluation now, will plan to re-attempt at a later time.

## 2023-04-13 NOTE — DISCHARGE INSTRUCTIONS
Sometimes managing your health at home requires assistance. The [place] team has recognized your preference to use [facility]   [phone]  . A representative from the home health agency will contact you or your family to schedule your first visit. Please follow up w/ your oncologist out patient to complete out patient radiation.   Coumadin is on hold for now until you see the cardiologist for further recommendations regarding your anticoagulation

## 2023-04-14 RX ORDER — ONDANSETRON 4 MG/1
4 TABLET, ORALLY DISINTEGRATING ORAL EVERY 8 HOURS PRN
Qty: 30 TABLET | Refills: 0 | Status: SHIPPED | OUTPATIENT
Start: 2023-04-14

## 2023-04-17 NOTE — PROGRESS NOTES
Selena Jose recently had an upper endoscopy (EGD) procedure completed with biopsies of the stomach. The biopsies of the stomach show NO infection. Please call the office if you have any questions or concerns about these results.      Sincerely,    Carlos Oakley MD  [facility]  [phone]

## 2023-05-01 ENCOUNTER — ORDER TRANSCRIPTION (OUTPATIENT)
Dept: PHYSICAL THERAPY | Facility: HOSPITAL | Age: 68
End: 2023-05-01

## 2023-05-01 DIAGNOSIS — C79.49 METASTASIS TO SPINAL CORD (HCC): Primary | ICD-10-CM

## 2023-05-01 DIAGNOSIS — R29.898 WEAKNESS OF BOTH LOWER LIMBS: ICD-10-CM

## 2023-05-01 DIAGNOSIS — R29.898 WEAKNESS OF BOTH UPPER EXTREMITIES: ICD-10-CM

## 2023-06-11 ENCOUNTER — APPOINTMENT (OUTPATIENT)
Dept: CT IMAGING | Facility: HOSPITAL | Age: 68
End: 2023-06-11
Attending: EMERGENCY MEDICINE
Payer: MEDICARE

## 2023-06-11 ENCOUNTER — HOSPITAL ENCOUNTER (INPATIENT)
Facility: HOSPITAL | Age: 68
LOS: 15 days | Discharge: NURSING, REHAB OR LONG-TERM CARE | End: 2023-06-26
Attending: EMERGENCY MEDICINE | Admitting: INTERNAL MEDICINE
Payer: MEDICARE

## 2023-06-11 DIAGNOSIS — C80.1 MENINGEAL CARCINOMATOSIS (HCC): ICD-10-CM

## 2023-06-11 DIAGNOSIS — C61 PROSTATE CANCER (HCC): ICD-10-CM

## 2023-06-11 DIAGNOSIS — R11.2 NAUSEA AND VOMITING, UNSPECIFIED VOMITING TYPE: ICD-10-CM

## 2023-06-11 DIAGNOSIS — C79.49 MENINGEAL CARCINOMATOSIS (HCC): ICD-10-CM

## 2023-06-11 DIAGNOSIS — I48.91 ATRIAL FIBRILLATION WITH RAPID VENTRICULAR RESPONSE (HCC): Primary | ICD-10-CM

## 2023-06-11 LAB
ALBUMIN SERPL-MCNC: 2.7 G/DL (ref 3.4–5)
ALBUMIN/GLOB SERPL: 0.6 {RATIO} (ref 1–2)
ALP LIVER SERPL-CCNC: 294 U/L
ALT SERPL-CCNC: 27 U/L
ANION GAP SERPL CALC-SCNC: 5 MMOL/L (ref 0–18)
ANION GAP SERPL CALC-SCNC: 6 MMOL/L (ref 0–18)
APTT PPP: 44.6 SECONDS (ref 23.3–35.6)
APTT PPP: 66 SECONDS (ref 23.3–35.6)
AST SERPL-CCNC: 68 U/L (ref 15–37)
BASOPHILS # BLD: 0.05 X10(3) UL (ref 0–0.2)
BASOPHILS NFR BLD: 1 %
BILIRUB SERPL-MCNC: 0.7 MG/DL (ref 0.1–2)
BUN BLD-MCNC: 11 MG/DL (ref 7–18)
BUN BLD-MCNC: 9 MG/DL (ref 7–18)
CALCIUM BLD-MCNC: 8.2 MG/DL (ref 8.5–10.1)
CALCIUM BLD-MCNC: 8.8 MG/DL (ref 8.5–10.1)
CHLORIDE SERPL-SCNC: 103 MMOL/L (ref 98–112)
CHLORIDE SERPL-SCNC: 105 MMOL/L (ref 98–112)
CO2 SERPL-SCNC: 22 MMOL/L (ref 21–32)
CO2 SERPL-SCNC: 23 MMOL/L (ref 21–32)
CREAT BLD-MCNC: 0.46 MG/DL
CREAT BLD-MCNC: 0.57 MG/DL
EOSINOPHIL # BLD: 0 X10(3) UL (ref 0–0.7)
EOSINOPHIL NFR BLD: 0 %
ERYTHROCYTE [DISTWIDTH] IN BLOOD BY AUTOMATED COUNT: 15.1 %
ERYTHROCYTE [DISTWIDTH] IN BLOOD BY AUTOMATED COUNT: 15.1 %
EST. AVERAGE GLUCOSE BLD GHB EST-MCNC: 108 MG/DL (ref 68–126)
GFR SERPLBLD BASED ON 1.73 SQ M-ARVRAT: 109 ML/MIN/1.73M2 (ref 60–?)
GFR SERPLBLD BASED ON 1.73 SQ M-ARVRAT: 116 ML/MIN/1.73M2 (ref 60–?)
GLOBULIN PLAS-MCNC: 4.3 G/DL (ref 2.8–4.4)
GLUCOSE BLD-MCNC: 110 MG/DL (ref 70–99)
GLUCOSE BLD-MCNC: 118 MG/DL (ref 70–99)
GLUCOSE BLD-MCNC: 125 MG/DL (ref 70–99)
GLUCOSE BLD-MCNC: 130 MG/DL (ref 70–99)
GLUCOSE BLD-MCNC: 132 MG/DL (ref 70–99)
GLUCOSE BLD-MCNC: 152 MG/DL (ref 70–99)
HBA1C MFR BLD: 5.4 % (ref ?–5.7)
HCT VFR BLD AUTO: 29 %
HCT VFR BLD AUTO: 32.1 %
HGB BLD-MCNC: 10 G/DL
HGB BLD-MCNC: 8.9 G/DL
LIPASE SERPL-CCNC: 26 U/L (ref 13–75)
LYMPHOCYTES NFR BLD: 0.9 X10(3) UL (ref 1–4)
LYMPHOCYTES NFR BLD: 18 %
MCH RBC QN AUTO: 25.9 PG (ref 26–34)
MCH RBC QN AUTO: 26.1 PG (ref 26–34)
MCHC RBC AUTO-ENTMCNC: 30.7 G/DL (ref 31–37)
MCHC RBC AUTO-ENTMCNC: 31.2 G/DL (ref 31–37)
MCV RBC AUTO: 83.2 FL
MCV RBC AUTO: 85 FL
MONOCYTES # BLD: 0.35 X10(3) UL (ref 0.1–1)
MONOCYTES NFR BLD: 7 %
MORPHOLOGY: NORMAL
NEUTROPHILS # BLD AUTO: 3.19 X10 (3) UL (ref 1.5–7.7)
NEUTROPHILS NFR BLD: 73 %
NEUTS BAND NFR BLD: 1 %
NEUTS HYPERSEG # BLD: 3.7 X10(3) UL (ref 1.5–7.7)
OSMOLALITY SERPL CALC.SUM OF ELEC: 274 MOSM/KG (ref 275–295)
OSMOLALITY SERPL CALC.SUM OF ELEC: 276 MOSM/KG (ref 275–295)
PLATELET # BLD AUTO: 250 10(3)UL (ref 150–450)
PLATELET # BLD AUTO: 267 10(3)UL (ref 150–450)
POTASSIUM SERPL-SCNC: 3.7 MMOL/L (ref 3.5–5.1)
POTASSIUM SERPL-SCNC: 3.8 MMOL/L (ref 3.5–5.1)
PROT SERPL-MCNC: 7 G/DL (ref 6.4–8.2)
Q-T INTERVAL: 334 MS
QRS DURATION: 92 MS
QTC CALCULATION (BEZET): 487 MS
R AXIS: -22 DEGREES
RBC # BLD AUTO: 3.41 X10(6)UL
RBC # BLD AUTO: 3.86 X10(6)UL
SARS-COV-2 RNA RESP QL NAA+PROBE: NOT DETECTED
SODIUM SERPL-SCNC: 131 MMOL/L (ref 136–145)
SODIUM SERPL-SCNC: 133 MMOL/L (ref 136–145)
T AXIS: 134 DEGREES
TOTAL CELLS COUNTED BLD: 100
TROPONIN I HIGH SENSITIVITY: 14 NG/L
VENTRICULAR RATE: 128 BPM
WBC # BLD AUTO: 5 X10(3) UL (ref 4–11)
WBC # BLD AUTO: 5 X10(3) UL (ref 4–11)

## 2023-06-11 PROCEDURE — 99223 1ST HOSP IP/OBS HIGH 75: CPT | Performed by: INTERNAL MEDICINE

## 2023-06-11 PROCEDURE — 74177 CT ABD & PELVIS W/CONTRAST: CPT | Performed by: EMERGENCY MEDICINE

## 2023-06-11 PROCEDURE — 99497 ADVNCD CARE PLAN 30 MIN: CPT | Performed by: INTERNAL MEDICINE

## 2023-06-11 PROCEDURE — 71275 CT ANGIOGRAPHY CHEST: CPT | Performed by: EMERGENCY MEDICINE

## 2023-06-11 RX ORDER — BISACODYL 10 MG
10 SUPPOSITORY, RECTAL RECTAL
Status: DISCONTINUED | OUTPATIENT
Start: 2023-06-11 | End: 2023-06-26

## 2023-06-11 RX ORDER — MULTIVIT-MIN/IRON FUM/FOLIC AC 7.5 MG-4
1 TABLET ORAL DAILY
COMMUNITY

## 2023-06-11 RX ORDER — MORPHINE SULFATE 15 MG/1
30 TABLET, FILM COATED, EXTENDED RELEASE ORAL EVERY 12 HOURS SCHEDULED
Status: DISCONTINUED | OUTPATIENT
Start: 2023-06-11 | End: 2023-06-26

## 2023-06-11 RX ORDER — HEPARIN SODIUM 1000 [USP'U]/ML
5000 INJECTION, SOLUTION INTRAVENOUS; SUBCUTANEOUS ONCE
Status: COMPLETED | OUTPATIENT
Start: 2023-06-11 | End: 2023-06-11

## 2023-06-11 RX ORDER — OXYCODONE HYDROCHLORIDE 15 MG/1
30 TABLET ORAL EVERY 6 HOURS PRN
Status: DISCONTINUED | OUTPATIENT
Start: 2023-06-11 | End: 2023-06-26

## 2023-06-11 RX ORDER — ACETAMINOPHEN 500 MG
500 TABLET ORAL EVERY 4 HOURS PRN
Status: DISCONTINUED | OUTPATIENT
Start: 2023-06-11 | End: 2023-06-26

## 2023-06-11 RX ORDER — POLYETHYLENE GLYCOL 3350 17 G/17G
17 POWDER, FOR SOLUTION ORAL DAILY PRN
Status: DISCONTINUED | OUTPATIENT
Start: 2023-06-11 | End: 2023-06-26

## 2023-06-11 RX ORDER — TAMSULOSIN HYDROCHLORIDE 0.4 MG/1
0.4 CAPSULE ORAL DAILY
Status: DISCONTINUED | OUTPATIENT
Start: 2023-06-11 | End: 2023-06-26

## 2023-06-11 RX ORDER — NICOTINE POLACRILEX 4 MG
30 LOZENGE BUCCAL
Status: DISCONTINUED | OUTPATIENT
Start: 2023-06-11 | End: 2023-06-26

## 2023-06-11 RX ORDER — COLCHICINE 0.6 MG/1
0.6 TABLET ORAL 2 TIMES DAILY
COMMUNITY
End: 2023-06-26

## 2023-06-11 RX ORDER — GABAPENTIN 250 MG/5ML
100 SOLUTION ORAL DAILY
Status: DISCONTINUED | OUTPATIENT
Start: 2023-06-11 | End: 2023-06-19 | Stop reason: SDUPTHER

## 2023-06-11 RX ORDER — ONDANSETRON 2 MG/ML
4 INJECTION INTRAMUSCULAR; INTRAVENOUS ONCE
Status: COMPLETED | OUTPATIENT
Start: 2023-06-11 | End: 2023-06-11

## 2023-06-11 RX ORDER — FAMOTIDINE 20 MG/1
20 TABLET, FILM COATED ORAL 2 TIMES DAILY
COMMUNITY

## 2023-06-11 RX ORDER — DEXTROSE MONOHYDRATE 25 G/50ML
50 INJECTION, SOLUTION INTRAVENOUS
Status: DISCONTINUED | OUTPATIENT
Start: 2023-06-11 | End: 2023-06-26

## 2023-06-11 RX ORDER — FAMOTIDINE 20 MG/1
20 TABLET, FILM COATED ORAL 2 TIMES DAILY
Status: DISCONTINUED | OUTPATIENT
Start: 2023-06-11 | End: 2023-06-26

## 2023-06-11 RX ORDER — SENNOSIDES 8.6 MG
17.2 TABLET ORAL NIGHTLY PRN
Status: DISCONTINUED | OUTPATIENT
Start: 2023-06-11 | End: 2023-06-26

## 2023-06-11 RX ORDER — FINASTERIDE 5 MG/1
5 TABLET, FILM COATED ORAL DAILY
Status: DISCONTINUED | OUTPATIENT
Start: 2023-06-11 | End: 2023-06-26

## 2023-06-11 RX ORDER — ONDANSETRON 2 MG/ML
4 INJECTION INTRAMUSCULAR; INTRAVENOUS EVERY 6 HOURS PRN
Status: DISCONTINUED | OUTPATIENT
Start: 2023-06-11 | End: 2023-06-26

## 2023-06-11 RX ORDER — HEPARIN SODIUM AND DEXTROSE 10000; 5 [USP'U]/100ML; G/100ML
1000 INJECTION INTRAVENOUS ONCE
Status: COMPLETED | OUTPATIENT
Start: 2023-06-11 | End: 2023-06-11

## 2023-06-11 RX ORDER — DEXAMETHASONE 4 MG/1
4 TABLET ORAL
Status: DISCONTINUED | OUTPATIENT
Start: 2023-06-11 | End: 2023-06-15

## 2023-06-11 RX ORDER — MELATONIN
3 NIGHTLY PRN
Status: DISCONTINUED | OUTPATIENT
Start: 2023-06-11 | End: 2023-06-26

## 2023-06-11 RX ORDER — COLCHICINE 0.6 MG/1
0.6 TABLET ORAL 2 TIMES DAILY
Status: DISCONTINUED | OUTPATIENT
Start: 2023-06-11 | End: 2023-06-20

## 2023-06-11 RX ORDER — METOPROLOL SUCCINATE 25 MG/1
25 TABLET, EXTENDED RELEASE ORAL
Status: DISCONTINUED | OUTPATIENT
Start: 2023-06-11 | End: 2023-06-26

## 2023-06-11 RX ORDER — ENEMA 19; 7 G/133ML; G/133ML
1 ENEMA RECTAL ONCE AS NEEDED
Status: DISCONTINUED | OUTPATIENT
Start: 2023-06-11 | End: 2023-06-26

## 2023-06-11 RX ORDER — NICOTINE POLACRILEX 4 MG
15 LOZENGE BUCCAL
Status: DISCONTINUED | OUTPATIENT
Start: 2023-06-11 | End: 2023-06-26

## 2023-06-11 RX ORDER — HEPARIN SODIUM AND DEXTROSE 10000; 5 [USP'U]/100ML; G/100ML
INJECTION INTRAVENOUS CONTINUOUS
Status: DISCONTINUED | OUTPATIENT
Start: 2023-06-11 | End: 2023-06-19

## 2023-06-11 NOTE — ED INITIAL ASSESSMENT (HPI)
Patient speaks David Angel, here for nausea/vomiting for a couple days, Per ems patient sons reports patient has been throwing up for past couple days and called 911 d/t it sounding like patient was choking on his spit.

## 2023-06-11 NOTE — ED QUICK NOTES
Orders for admission, patient is aware of plan and ready to go upstairs. Any questions, please call ED RN Tasha Can at extension [phone].      Patient Covid vaccination status: Fully vaccinated     COVID Test Ordered in ED: None    COVID Suspicion at Admission: N/A    Running Infusions:   n/a     Mental Status/LOC at time of transport: a/ox4    Other pertinent information: speaks Hind son at bedside and able to translate  CIWA score: N/A   NIH score:  N/A

## 2023-06-11 NOTE — PHYSICAL THERAPY NOTE
Physical therapy Note:    Pt orders received for mobility assessment with PT. Pt declined to participate stating he was too fatigued today- was admitted in the early morning hours, has been getting radiation therapy for prostate cancer. . Will attempt again later in the day as time/schedyule allows.

## 2023-06-11 NOTE — PLAN OF CARE
Pt Alert and orientated x3. Pt primarily speaks [place] but can communicate basic ADL's in [place]. Lungs dim on 2 L  NC. Afib with PVC on tele. Incont of urine. Pt briefed. PT/OT ordered. NPO for ST eval. Pt has generalized weakness. Able to turn side to side with assistance. Abd distended. Bowel sounds active. Pt with intermittent nausea. Heparin gtt infusing currently at 10ml/hr. Bed alarm on for safety. Call light within reach. Problem: SAFETY ADULT - FALL  Goal: Free from fall injury  Description: INTERVENTIONS:  - Assess pt frequently for physical needs  - Identify cognitive and physical deficits and behaviors that affect risk of falls. - [place] fall precautions as indicated by assessment.  - Educate pt/family on patient safety including physical limitations  - Instruct pt to call for assistance with activity based on assessment  - Modify environment to reduce risk of injury  - Provide assistive devices as appropriate  - Consider OT/PT consult to assist with strengthening/mobility  - Encourage toileting schedule  Outcome: Progressing     Problem: CARDIOVASCULAR - ADULT  Goal: Maintains optimal cardiac output and hemodynamic stability  Description: INTERVENTIONS:  - Monitor vital signs, rhythm, and trends  - Monitor for bleeding, hypotension and signs of decreased cardiac output  - Evaluate effectiveness of vasoactive medications to optimize hemodynamic stability  - Monitor arterial and/or venous puncture sites for bleeding and/or hematoma  - Assess quality of pulses, skin color and temperature  - Assess for signs of decreased coronary artery perfusion - ex.  Angina  - Evaluate fluid balance, assess for edema, trend weights  Outcome: Progressing  Goal: Absence of cardiac arrhythmias or at baseline  Description: INTERVENTIONS:  - Continuous cardiac monitoring, monitor vital signs, obtain 12 lead EKG if indicated  - Evaluate effectiveness of antiarrhythmic and heart rate control medications as ordered  - Initiate emergency measures for life threatening arrhythmias  - Monitor electrolytes and administer replacement therapy as ordered  Outcome: Progressing

## 2023-06-11 NOTE — PLAN OF CARE
Patient alert and oriented. Per son Tesha Delvalle occasional confusion. Admitted with weakness, vomiting. Pt speaks Jehovah's witness, however son Tesha Delvalle at bedside who speaks  [place]. Admission history and medication history per son. Denies pain. 1L oxygen with SPO2 95%  and greater. Fall precaution in place. PT/OT To see patient, speech to eval. Kept clean and dry. Assisted with ADL's. Problem: SAFETY ADULT - FALL  Goal: Free from fall injury  Description: INTERVENTIONS:  - Assess pt frequently for physical needs  - Identify cognitive and physical deficits and behaviors that affect risk of falls. - [place] fall precautions as indicated by assessment.  - Educate pt/family on patient safety including physical limitations  - Instruct pt to call for assistance with activity based on assessment  - Modify environment to reduce risk of injury  - Provide assistive devices as appropriate  - Consider OT/PT consult to assist with strengthening/mobility  - Encourage toileting schedule  Outcome: Progressing     Problem: CARDIOVASCULAR - ADULT  Goal: Maintains optimal cardiac output and hemodynamic stability  Description: INTERVENTIONS:  - Monitor vital signs, rhythm, and trends  - Monitor for bleeding, hypotension and signs of decreased cardiac output  - Evaluate effectiveness of vasoactive medications to optimize hemodynamic stability  - Monitor arterial and/or venous puncture sites for bleeding and/or hematoma  - Assess quality of pulses, skin color and temperature  - Assess for signs of decreased coronary artery perfusion - ex.  Angina  - Evaluate fluid balance, assess for edema, trend weights  Outcome: Progressing  Goal: Absence of cardiac arrhythmias or at baseline  Description: INTERVENTIONS:  - Continuous cardiac monitoring, monitor vital signs, obtain 12 lead EKG if indicated  - Evaluate effectiveness of antiarrhythmic and heart rate control medications as ordered  - Initiate emergency measures for life threatening arrhythmias  - Monitor electrolytes and administer replacement therapy as ordered  Outcome: Progressing

## 2023-06-11 NOTE — PROGRESS NOTES
Asked if pt needs to be on apalutamide for metastatic prostate ca while inpt. Hold while inpatient due to acute issues. I discontinued apalutamide from [facility]. Please page if full consult required.     Layla Andrews MD  Hematology/Medical Oncology  [facility]

## 2023-06-12 LAB
ALBUMIN SERPL-MCNC: 2.3 G/DL (ref 3.4–5)
ALBUMIN/GLOB SERPL: 0.5 {RATIO} (ref 1–2)
ALP LIVER SERPL-CCNC: 374 U/L
ALT SERPL-CCNC: 28 U/L
ANION GAP SERPL CALC-SCNC: 9 MMOL/L (ref 0–18)
APTT PPP: 74.2 SECONDS (ref 23.3–35.6)
AST SERPL-CCNC: 104 U/L (ref 15–37)
BILIRUB SERPL-MCNC: 0.9 MG/DL (ref 0.1–2)
BUN BLD-MCNC: 9 MG/DL (ref 7–18)
CALCIUM BLD-MCNC: 7.7 MG/DL (ref 8.5–10.1)
CHLORIDE SERPL-SCNC: 103 MMOL/L (ref 98–112)
CO2 SERPL-SCNC: 21 MMOL/L (ref 21–32)
CREAT BLD-MCNC: 0.37 MG/DL
ERYTHROCYTE [DISTWIDTH] IN BLOOD BY AUTOMATED COUNT: 15.2 %
GFR SERPLBLD BASED ON 1.73 SQ M-ARVRAT: 124 ML/MIN/1.73M2 (ref 60–?)
GLOBULIN PLAS-MCNC: 4.2 G/DL (ref 2.8–4.4)
GLUCOSE BLD-MCNC: 115 MG/DL (ref 70–99)
GLUCOSE BLD-MCNC: 119 MG/DL (ref 70–99)
GLUCOSE BLD-MCNC: 126 MG/DL (ref 70–99)
GLUCOSE BLD-MCNC: 159 MG/DL (ref 70–99)
GLUCOSE BLD-MCNC: 195 MG/DL (ref 70–99)
HCT VFR BLD AUTO: 27.9 %
HGB BLD-MCNC: 8.4 G/DL
MAGNESIUM SERPL-MCNC: 1.8 MG/DL (ref 1.6–2.6)
MCH RBC QN AUTO: 25.9 PG (ref 26–34)
MCHC RBC AUTO-ENTMCNC: 30.1 G/DL (ref 31–37)
MCV RBC AUTO: 86.1 FL
OSMOLALITY SERPL CALC.SUM OF ELEC: 276 MOSM/KG (ref 275–295)
PHOSPHATE SERPL-MCNC: 2.2 MG/DL (ref 2.5–4.9)
PLATELET # BLD AUTO: 211 10(3)UL (ref 150–450)
PLATELET # BLD AUTO: 211 10(3)UL (ref 150–450)
POTASSIUM SERPL-SCNC: 3.6 MMOL/L (ref 3.5–5.1)
PROT SERPL-MCNC: 6.5 G/DL (ref 6.4–8.2)
RBC # BLD AUTO: 3.24 X10(6)UL
SODIUM SERPL-SCNC: 133 MMOL/L (ref 136–145)
WBC # BLD AUTO: 4.3 X10(3) UL (ref 4–11)

## 2023-06-12 PROCEDURE — 99233 SBSQ HOSP IP/OBS HIGH 50: CPT | Performed by: HOSPITALIST

## 2023-06-12 NOTE — PLAN OF CARE
Pt is a/ox4. Son at bedside overnight. On room air, vitals signs stable. A-fib on tele. C/o some pain in back, scheduled pain meds given. Prn zofran given for nausea. Q2 repositioning as patient allows. Heparin gtt infusing per order. All needs met at this time. Problem: SAFETY ADULT - FALL  Goal: Free from fall injury  Description: INTERVENTIONS:  - Assess pt frequently for physical needs  - Identify cognitive and physical deficits and behaviors that affect risk of falls. - [place] fall precautions as indicated by assessment.  - Educate pt/family on patient safety including physical limitations  - Instruct pt to call for assistance with activity based on assessment  - Modify environment to reduce risk of injury  - Provide assistive devices as appropriate  - Consider OT/PT consult to assist with strengthening/mobility  - Encourage toileting schedule  Outcome: Progressing     Problem: CARDIOVASCULAR - ADULT  Goal: Maintains optimal cardiac output and hemodynamic stability  Description: INTERVENTIONS:  - Monitor vital signs, rhythm, and trends  - Monitor for bleeding, hypotension and signs of decreased cardiac output  - Evaluate effectiveness of vasoactive medications to optimize hemodynamic stability  - Monitor arterial and/or venous puncture sites for bleeding and/or hematoma  - Assess quality of pulses, skin color and temperature  - Assess for signs of decreased coronary artery perfusion - ex.  Angina  - Evaluate fluid balance, assess for edema, trend weights  Outcome: Progressing  Goal: Absence of cardiac arrhythmias or at baseline  Description: INTERVENTIONS:  - Continuous cardiac monitoring, monitor vital signs, obtain 12 lead EKG if indicated  - Evaluate effectiveness of antiarrhythmic and heart rate control medications as ordered  - Initiate emergency measures for life threatening arrhythmias  - Monitor electrolytes and administer replacement therapy as ordered  Outcome: Progressing

## 2023-06-12 NOTE — CM/SW NOTE
Department  notified of request for Acute Rehab, aidin referrals started. Assigned CM/SW to follow up with pt/family on further discharge planning.      Brigitte Gu  [facility]

## 2023-06-12 NOTE — CM/SW NOTE
06/12/23 1400   CM/ Referral Data   Referral Source Physician   Reason for Referral Discharge planning   Informant Patient;Sibling   Patient Info   Patient's Current Mental Status at Time of Assessment Alert;Oriented   Patient Communication Issues Language barrier   Patient's [facility]   Number of Levels in Home 2   Patient lives with Son;Spouse/Significant other   Patient Status Prior to Admission   Independent with ADLs and Mobility No   Pt. requires assistance with Housework;Driving; Bathing;Meals; Toileting   Services in place prior to admission Home Health Care;DME/Supplies at home   [address] [place] [address]   Type of DME/Supplies Obadiah Ivelisse; Hospital Bed   Discharge Needs   Anticipated D/C needs Home health care; Acute rehab   Services Requested   PMR Consult Requested Consult ordered   Choice of Post-Acute Provider   Informed patient of right to choose their preferred provider Yes     MD order received for [facility] services  Met with patient and his brother at the bedside. Pt primarily Bengali speaking, bu could respond to basic questions and deferred to his brother to assist w/translation for eval.  Pt lives w/family and requires assist w/ADLS, current w/HH services and per chart review, pt recently discharged with [facility]- [facility] entered and confirmed via Aidin message that he is active w/[facility]. Also noted Acute Re[facility] stay at [facility] in 3/2023    Pt is a 74year old male with history of metastatic prostate cancer who presented to the ED with nausea, vomiting and abdominal pain. Pt was found choking on his secrestions at home. Cardiology consulted- A feb RVR, possible left atrial appendage thrombus. Gi following. PT/OT consulted and sent message that recommendation is Acute Rehab pending Oncology plan. CM requested department  [facility]) to initiate [address] referral for Acute Rehab.     Plan: Acute Rehab vs [facility] pending Oncology plan/progress    / to remain available for support and/or discharge planning.      Monica VELASQUEZA MSN, RN CTL/  [id number]

## 2023-06-12 NOTE — PLAN OF CARE
Assumed care at 0730. Pt is A&Ox4. Pt is on RA with lung sounds diminished. Afib on tele, rates controlled, VSS. No complaints of cardiac symptoms. Continent of B&B. Denies pain at this time. Rolling side to side in bed, unable to assess how pt gets up but will see with PT today. Plan of care reviewed with patient, verbalizes understanding, all needs addressed at this time, pt seems to be resting comfortably. Bed locked and in lowest position. Call light at bedside. Problem: SAFETY ADULT - FALL  Goal: Free from fall injury  Description: INTERVENTIONS:  - Assess pt frequently for physical needs  - Identify cognitive and physical deficits and behaviors that affect risk of falls. - [place] fall precautions as indicated by assessment.  - Educate pt/family on patient safety including physical limitations  - Instruct pt to call for assistance with activity based on assessment  - Modify environment to reduce risk of injury  - Provide assistive devices as appropriate  - Consider OT/PT consult to assist with strengthening/mobility  - Encourage toileting schedule  Outcome: Progressing     Problem: CARDIOVASCULAR - ADULT  Goal: Maintains optimal cardiac output and hemodynamic stability  Description: INTERVENTIONS:  - Monitor vital signs, rhythm, and trends  - Monitor for bleeding, hypotension and signs of decreased cardiac output  - Evaluate effectiveness of vasoactive medications to optimize hemodynamic stability  - Monitor arterial and/or venous puncture sites for bleeding and/or hematoma  - Assess quality of pulses, skin color and temperature  - Assess for signs of decreased coronary artery perfusion - ex.  Angina  - Evaluate fluid balance, assess for edema, trend weights  Outcome: Progressing  Goal: Absence of cardiac arrhythmias or at baseline  Description: INTERVENTIONS:  - Continuous cardiac monitoring, monitor vital signs, obtain 12 lead EKG if indicated  - Evaluate effectiveness of antiarrhythmic and heart rate control medications as ordered  - Initiate emergency measures for life threatening arrhythmias  - Monitor electrolytes and administer replacement therapy as ordered  Outcome: Progressing

## 2023-06-13 LAB
APTT PPP: 69.1 SECONDS (ref 23.3–35.6)
BASOPHILS # BLD AUTO: 0.01 X10(3) UL (ref 0–0.2)
BASOPHILS NFR BLD AUTO: 0.3 %
EOSINOPHIL # BLD AUTO: 0.02 X10(3) UL (ref 0–0.7)
EOSINOPHIL NFR BLD AUTO: 0.6 %
ERYTHROCYTE [DISTWIDTH] IN BLOOD BY AUTOMATED COUNT: 15.1 %
GLUCOSE BLD-MCNC: 138 MG/DL (ref 70–99)
GLUCOSE BLD-MCNC: 150 MG/DL (ref 70–99)
GLUCOSE BLD-MCNC: 155 MG/DL (ref 70–99)
GLUCOSE BLD-MCNC: 238 MG/DL (ref 70–99)
HCT VFR BLD AUTO: 26.9 %
HGB BLD-MCNC: 8.4 G/DL
HGB RETIC QN AUTO: 29.6 PG (ref 28.2–36.6)
IMM GRANULOCYTES # BLD AUTO: 0.16 X10(3) UL (ref 0–1)
IMM GRANULOCYTES NFR BLD: 4.7 %
IMM RETICS NFR: 0.33 RATIO (ref 0.1–0.3)
INR BLD: 1.62 (ref 0.85–1.16)
IRON SATN MFR SERPL: 21 %
IRON SERPL-MCNC: 40 UG/DL
LDH SERPL L TO P-CCNC: 1797 U/L
LYMPHOCYTES # BLD AUTO: 0.77 X10(3) UL (ref 1–4)
LYMPHOCYTES NFR BLD AUTO: 22.6 %
MCH RBC QN AUTO: 26.1 PG (ref 26–34)
MCHC RBC AUTO-ENTMCNC: 31.2 G/DL (ref 31–37)
MCV RBC AUTO: 83.5 FL
MONOCYTES # BLD AUTO: 0.44 X10(3) UL (ref 0.1–1)
MONOCYTES NFR BLD AUTO: 12.9 %
NEUTROPHILS # BLD AUTO: 2.01 X10 (3) UL (ref 1.5–7.7)
NEUTROPHILS # BLD AUTO: 2.01 X10(3) UL (ref 1.5–7.7)
NEUTROPHILS NFR BLD AUTO: 58.9 %
PLATELET # BLD AUTO: 202 10(3)UL (ref 150–450)
PLATELET # BLD AUTO: 202 10(3)UL (ref 150–450)
PROTHROMBIN TIME: 19.1 SECONDS (ref 11.6–14.8)
PSA SERPL-MCNC: 154 NG/ML (ref ?–4)
RBC # BLD AUTO: 3.22 X10(6)UL
RETICS # AUTO: 87.4 X10(3) UL (ref 22.5–147.5)
RETICS/RBC NFR AUTO: 2.8 %
TIBC SERPL-MCNC: 189 UG/DL (ref 240–450)
TRANSFERRIN SERPL-MCNC: 127 MG/DL (ref 200–360)
WBC # BLD AUTO: 3.4 X10(3) UL (ref 4–11)

## 2023-06-13 PROCEDURE — 99232 SBSQ HOSP IP/OBS MODERATE 35: CPT | Performed by: HOSPITALIST

## 2023-06-13 PROCEDURE — 99223 1ST HOSP IP/OBS HIGH 75: CPT | Performed by: INTERNAL MEDICINE

## 2023-06-13 RX ORDER — WARFARIN SODIUM 5 MG/1
5 TABLET ORAL
Status: COMPLETED | OUTPATIENT
Start: 2023-06-13 | End: 2023-06-13

## 2023-06-13 NOTE — CM/SW NOTE
Met w/pt and son at the bedside and provided list of available Acute Rehab facilities. Awaiting Oncology plan. If plan is for treatment first pt will go home with [facility]. If treatment deferred for now, plan is Acute Rehab. / to remain available for support and/or discharge planning.      Reggie Medeiros MBA MSN, RN CTL/  [id number]

## 2023-06-13 NOTE — PROGRESS NOTES
[facility] Dosing Service  Warfarin (Coumadin) Initial Dosing    Ana Mcdaniel is a 74year old patient for whom pharmacy has been consulted to dose warfarin (COUMADIN) for  afib and possible L atrial appendage thrombus  by DONNA Clarke. Based on this indication, goal INR is 2-3. Pertinent Patient Medical History:  afib  Potential Drug Interactions:  apalutamide (home med, not ordered inpatient), dexamethasone    Latest INR:   Recent Labs     06/13/23  0523   INR 1.62*       Other Anticoagulants:  IV heparin    Based on above -  1. For today, Give warfarin (COUMADIN) 5 mg at 2100 tonight    2. PT/INR ordered daily while on warfarin    3. Pharmacy will continue to follow. We appreciate the opportunity to assist in the care of this patient.     Rishi Prince, PharmD  6/13/2023  5:51 PM

## 2023-06-13 NOTE — PLAN OF CARE
Assumed care at 0730. Pt is A&Ox4. Pt is on 1L NC for comfort, lung sounds diminished;clear. Afib on tele, VSS. No complaints of cardiac symptoms. Incontinent of B&B. Denies pain at this time. 2 person assist with rolling walker, tolerating well. Plan of care reviewed with patient, verbalizes understanding, all needs addressed at this time. Bed locked and in lowest position. Call light at bedside. Problem: CARDIOVASCULAR - ADULT  Goal: Maintains optimal cardiac output and hemodynamic stability  Description: INTERVENTIONS:  - Monitor vital signs, rhythm, and trends  - Monitor for bleeding, hypotension and signs of decreased cardiac output  - Evaluate effectiveness of vasoactive medications to optimize hemodynamic stability  - Monitor arterial and/or venous puncture sites for bleeding and/or hematoma  - Assess quality of pulses, skin color and temperature  - Assess for signs of decreased coronary artery perfusion - ex. Angina  - Evaluate fluid balance, assess for edema, trend weights  Outcome: Progressing  Goal: Absence of cardiac arrhythmias or at baseline  Description: INTERVENTIONS:  - Continuous cardiac monitoring, monitor vital signs, obtain 12 lead EKG if indicated  - Evaluate effectiveness of antiarrhythmic and heart rate control medications as ordered  - Initiate emergency measures for life threatening arrhythmias  - Monitor electrolytes and administer replacement therapy as ordered  Outcome: Progressing     Problem: SAFETY ADULT - FALL  Goal: Free from fall injury  Description: INTERVENTIONS:  - Assess pt frequently for physical needs  - Identify cognitive and physical deficits and behaviors that affect risk of falls.   - [place] fall precautions as indicated by assessment.  - Educate pt/family on patient safety including physical limitations  - Instruct pt to call for assistance with activity based on assessment  - Modify environment to reduce risk of injury  - Provide assistive devices as appropriate  - Consider OT/PT consult to assist with strengthening/mobility  - Encourage toileting schedule  Outcome: Progressing

## 2023-06-13 NOTE — PLAN OF CARE
Notified by Rn that pt had blood tinged sputum. No blood noted in bowel movement or in urine per RN.      Plan: check CBC

## 2023-06-13 NOTE — PROGRESS NOTES
Pt is a/ox4. Son at bedside overnight, translating as needed. On 1L O2 for comfort while coughing up sputum. Coughs up thick mostly clear sputum, this morning with bright red blood. Cards apn notified. Cbc ordered. A-fib on tele. Vitals stable. Heparin gtt infusing per order. Pt does turn side to side by himself, repositioning as needed. Voiding. No bm overnight. C/o nausea, prn medication given. Pt and son updated on plan of care, all needs met at this time.

## 2023-06-14 ENCOUNTER — APPOINTMENT (OUTPATIENT)
Dept: MRI IMAGING | Facility: HOSPITAL | Age: 68
End: 2023-06-14
Attending: INTERNAL MEDICINE
Payer: MEDICARE

## 2023-06-14 LAB
ANION GAP SERPL CALC-SCNC: 7 MMOL/L (ref 0–18)
APTT PPP: 63.6 SECONDS (ref 23.3–35.6)
BUN BLD-MCNC: 8 MG/DL (ref 7–18)
CALCIUM BLD-MCNC: 8 MG/DL (ref 8.5–10.1)
CHLORIDE SERPL-SCNC: 100 MMOL/L (ref 98–112)
CO2 SERPL-SCNC: 23 MMOL/L (ref 21–32)
CREAT BLD-MCNC: 0.52 MG/DL
ERYTHROCYTE [DISTWIDTH] IN BLOOD BY AUTOMATED COUNT: 15 %
GFR SERPLBLD BASED ON 1.73 SQ M-ARVRAT: 112 ML/MIN/1.73M2 (ref 60–?)
GLUCOSE BLD-MCNC: 127 MG/DL (ref 70–99)
GLUCOSE BLD-MCNC: 128 MG/DL (ref 70–99)
GLUCOSE BLD-MCNC: 132 MG/DL (ref 70–99)
GLUCOSE BLD-MCNC: 152 MG/DL (ref 70–99)
GLUCOSE BLD-MCNC: 153 MG/DL (ref 70–99)
HCT VFR BLD AUTO: 25.5 %
HGB BLD-MCNC: 7.9 G/DL
HGB BLD-MCNC: 8 G/DL
INR BLD: 1.68 (ref 0.85–1.16)
MCH RBC QN AUTO: 26.2 PG (ref 26–34)
MCHC RBC AUTO-ENTMCNC: 31.4 G/DL (ref 31–37)
MCV RBC AUTO: 83.6 FL
OSMOLALITY SERPL CALC.SUM OF ELEC: 270 MOSM/KG (ref 275–295)
PLATELET # BLD AUTO: 209 10(3)UL (ref 150–450)
POTASSIUM SERPL-SCNC: 3.7 MMOL/L (ref 3.5–5.1)
PROTHROMBIN TIME: 19.7 SECONDS (ref 11.6–14.8)
RBC # BLD AUTO: 3.05 X10(6)UL
SODIUM SERPL-SCNC: 130 MMOL/L (ref 136–145)
WBC # BLD AUTO: 3.7 X10(3) UL (ref 4–11)

## 2023-06-14 PROCEDURE — 99233 SBSQ HOSP IP/OBS HIGH 50: CPT | Performed by: INTERNAL MEDICINE

## 2023-06-14 PROCEDURE — 70553 MRI BRAIN STEM W/O & W/DYE: CPT | Performed by: INTERNAL MEDICINE

## 2023-06-14 RX ORDER — GADOTERATE MEGLUMINE 376.9 MG/ML
30 INJECTION INTRAVENOUS
Status: COMPLETED | OUTPATIENT
Start: 2023-06-14 | End: 2023-06-14

## 2023-06-14 NOTE — OCCUPATIONAL THERAPY NOTE
Attempted to see pt for skilled OT services this date. Pt presented with increased fatigue. RN made aware of attempt. Will follow-up later today as schedule permits.

## 2023-06-14 NOTE — CONSULTS
[facility] Dosing Service  Warfarin (Coumadin) Subsequent Dosing    Enrique Andrews is a 74year old patient for whom pharmacy is dosing warfarin (Coumadin). Goal INR is 2-3    Recent Labs   Lab 06/13/23  0523 06/14/23  0556   INR 1.62* 1.68*       Consulted by: DONNA Han  Indication:  afib and possible L atrial appendage thrombus   Potential Drug Interactions: none  Other Anticoagulants:  heparin infusion   Home regimen (if applicable): n/a    Inpatient Dosing History:    Date 6/13 6/14       INR 1.62 1.68       Coumadin dose 5 mg 6 mg                Based on above -  1. For today, Give warfarin (COUMADIN) 6 mg at 2100 tonight  2   PT/INR ordered daily while on warfarin  3. Pharmacy will continue to follow. We appreciate the opportunity to assist in the care of this patient.     Ova Soulier, PharmD  6/14/2023  1:44 PM

## 2023-06-14 NOTE — PLAN OF CARE
Assumed care of patient @ 0730 patient resting in bed, AOX2, disoriented to situation and place, reports moderate pain, medication provided as ordered. Lung sounds clear but diminished bilaterally, O2 saturation maintained on room air. No complaints of shortness of breath or chest pain. Afib on tele, rates controlled. Bowel sounds present and active in all quadrants, last BM 6/12. Patient incontinent and voiding per external catheter. +1 edema to BL lower extremities. Plan of Care: EGD tomorrow. Heparin gtt. Pain control. Nausea control. Discussed plan of care with patient, son verbalized understanding. Call light within reach. 1830: Pt c/o of nausea, 1 episode of emesis, no blood noted. PRN medication provided. Problem: SAFETY ADULT - FALL  Goal: Free from fall injury  Description: INTERVENTIONS:  - Assess pt frequently for physical needs  - Identify cognitive and physical deficits and behaviors that affect risk of falls. - [place] fall precautions as indicated by assessment.  - Educate pt/family on patient safety including physical limitations  - Instruct pt to call for assistance with activity based on assessment  - Modify environment to reduce risk of injury  - Provide assistive devices as appropriate  - Consider OT/PT consult to assist with strengthening/mobility  - Encourage toileting schedule  Outcome: Progressing     Problem: CARDIOVASCULAR - ADULT  Goal: Maintains optimal cardiac output and hemodynamic stability  Description: INTERVENTIONS:  - Monitor vital signs, rhythm, and trends  - Monitor for bleeding, hypotension and signs of decreased cardiac output  - Evaluate effectiveness of vasoactive medications to optimize hemodynamic stability  - Monitor arterial and/or venous puncture sites for bleeding and/or hematoma  - Assess quality of pulses, skin color and temperature  - Assess for signs of decreased coronary artery perfusion - ex.  Angina  - Evaluate fluid balance, assess for edema, trend weights  Outcome: Progressing  Goal: Absence of cardiac arrhythmias or at baseline  Description: INTERVENTIONS:  - Continuous cardiac monitoring, monitor vital signs, obtain 12 lead EKG if indicated  - Evaluate effectiveness of antiarrhythmic and heart rate control medications as ordered  - Initiate emergency measures for life threatening arrhythmias  - Monitor electrolytes and administer replacement therapy as ordered  Outcome: Progressing

## 2023-06-14 NOTE — CM/SW NOTE
SW called pt son to follow up. Pt and son still waiting on oncology plan. Per son if [facility] is recommended they have a preference for [facility]. SW to remain available for dc planning.     Juan Luis Marie, [address]

## 2023-06-14 NOTE — PLAN OF CARE
Assumed care of patient at 1. Alert and oriented x4. On room air-1L for comfort. Adequate o2 saturations. Afib on tele with rates controlled. Heparin gtt infusing afib/ACS protocol. Incontinent, briefed and has primofit in place. Denies nausea. Up x2 and walker. Bed locked and in lowest position, call light within reach. Needs met at this time. Son at bedside. Plan:  Heparin-coumadin bridging  Monitor for bleeding  Control nausea and pain  MRI of brain    Problem: SAFETY ADULT - FALL  Goal: Free from fall injury  Description: INTERVENTIONS:  - Assess pt frequently for physical needs  - Identify cognitive and physical deficits and behaviors that affect risk of falls. - [place] fall precautions as indicated by assessment.  - Educate pt/family on patient safety including physical limitations  - Instruct pt to call for assistance with activity based on assessment  - Modify environment to reduce risk of injury  - Provide assistive devices as appropriate  - Consider OT/PT consult to assist with strengthening/mobility  - Encourage toileting schedule  Outcome: Progressing     Problem: CARDIOVASCULAR - ADULT  Goal: Maintains optimal cardiac output and hemodynamic stability  Description: INTERVENTIONS:  - Monitor vital signs, rhythm, and trends  - Monitor for bleeding, hypotension and signs of decreased cardiac output  - Evaluate effectiveness of vasoactive medications to optimize hemodynamic stability  - Monitor arterial and/or venous puncture sites for bleeding and/or hematoma  - Assess quality of pulses, skin color and temperature  - Assess for signs of decreased coronary artery perfusion - ex.  Angina  - Evaluate fluid balance, assess for edema, trend weights  Outcome: Progressing  Goal: Absence of cardiac arrhythmias or at baseline  Description: INTERVENTIONS:  - Continuous cardiac monitoring, monitor vital signs, obtain 12 lead EKG if indicated  - Evaluate effectiveness of antiarrhythmic and heart rate control medications as ordered  - Initiate emergency measures for life threatening arrhythmias  - Monitor electrolytes and administer replacement therapy as ordered  Outcome: Progressing

## 2023-06-15 LAB
ANION GAP SERPL CALC-SCNC: 7 MMOL/L (ref 0–18)
APTT PPP: 77.5 SECONDS (ref 23.3–35.6)
BASOPHILS # BLD: 0 X10(3) UL (ref 0–0.2)
BASOPHILS NFR BLD: 0 %
BUN BLD-MCNC: 8 MG/DL (ref 7–18)
CALCIUM BLD-MCNC: 8.3 MG/DL (ref 8.5–10.1)
CHLORIDE SERPL-SCNC: 99 MMOL/L (ref 98–112)
CO2 SERPL-SCNC: 23 MMOL/L (ref 21–32)
CREAT BLD-MCNC: 0.5 MG/DL
EOSINOPHIL # BLD: 0.03 X10(3) UL (ref 0–0.7)
EOSINOPHIL NFR BLD: 1 %
ERYTHROCYTE [DISTWIDTH] IN BLOOD BY AUTOMATED COUNT: 15.4 %
GFR SERPLBLD BASED ON 1.73 SQ M-ARVRAT: 113 ML/MIN/1.73M2 (ref 60–?)
GLUCOSE BLD-MCNC: 118 MG/DL (ref 70–99)
GLUCOSE BLD-MCNC: 119 MG/DL (ref 70–99)
GLUCOSE BLD-MCNC: 123 MG/DL (ref 70–99)
GLUCOSE BLD-MCNC: 136 MG/DL (ref 70–99)
GLUCOSE BLD-MCNC: 139 MG/DL (ref 70–99)
HCT VFR BLD AUTO: 26.2 %
HGB BLD-MCNC: 8 G/DL
INR BLD: 3.03 (ref 0.85–1.16)
LYMPHOCYTES NFR BLD: 0.47 X10(3) UL (ref 1–4)
LYMPHOCYTES NFR BLD: 15 %
MCH RBC QN AUTO: 26 PG (ref 26–34)
MCHC RBC AUTO-ENTMCNC: 30.5 G/DL (ref 31–37)
MCV RBC AUTO: 85.1 FL
METAMYELOCYTES # BLD: 0.06 X10(3) UL
METAMYELOCYTES NFR BLD: 2 %
MONOCYTES # BLD: 0.28 X10(3) UL (ref 0.1–1)
MONOCYTES NFR BLD: 9 %
MORPHOLOGY: NORMAL
NEUTROPHILS # BLD AUTO: 1.99 X10 (3) UL (ref 1.5–7.7)
NEUTROPHILS NFR BLD: 66 %
NEUTS BAND NFR BLD: 7 %
NEUTS HYPERSEG # BLD: 2.26 X10(3) UL (ref 1.5–7.7)
NRBC BLD MANUAL-RTO: 1 %
OSMOLALITY SERPL CALC.SUM OF ELEC: 267 MOSM/KG (ref 275–295)
PLATELET # BLD AUTO: 209 10(3)UL (ref 150–450)
PLATELET MORPHOLOGY: NORMAL
POTASSIUM SERPL-SCNC: 3.8 MMOL/L (ref 3.5–5.1)
PROTHROMBIN TIME: 31 SECONDS (ref 11.6–14.8)
RBC # BLD AUTO: 3.08 X10(6)UL
SODIUM SERPL-SCNC: 129 MMOL/L (ref 136–145)
TOTAL CELLS COUNTED BLD: 100
WBC # BLD AUTO: 3.1 X10(3) UL (ref 4–11)

## 2023-06-15 PROCEDURE — 99232 SBSQ HOSP IP/OBS MODERATE 35: CPT | Performed by: INTERNAL MEDICINE

## 2023-06-15 PROCEDURE — 99233 SBSQ HOSP IP/OBS HIGH 50: CPT | Performed by: INTERNAL MEDICINE

## 2023-06-15 RX ORDER — SODIUM CHLORIDE 9 MG/ML
INJECTION, SOLUTION INTRAVENOUS CONTINUOUS
Status: ACTIVE | OUTPATIENT
Start: 2023-06-15 | End: 2023-06-16

## 2023-06-15 RX ORDER — DEXAMETHASONE 4 MG/1
4 TABLET ORAL EVERY 12 HOURS SCHEDULED
Status: DISCONTINUED | OUTPATIENT
Start: 2023-06-15 | End: 2023-06-26

## 2023-06-15 NOTE — CM/SW NOTE
MALLORY reviewed chart for discharge planning. Awaiting patient/family decision on oncology plan and/or palliative care. Hem/Onc following up with son tomorrow. Per MALLORY/JOSUÉ notes; If oncology plan is for treatment first pt will go [facility] with [facility]. If treatment deferred for now, plan is Acute Rehab -- family prefers .    MALLORY spoke with Lucio ([phone]) to provide update. Sent updates to [place] and [place] in aidin. MALLORY/JOSUÉ to remain available for support and/or discharge planning.     SONY Fried  Discharge Planner  [phone]

## 2023-06-15 NOTE — PLAN OF CARE
Assumed care of patient at [address]. Alert and oriented x3-4. On room air with adequate O2 saturations. Afib on tele with rates controlled. On heparin gtt per afib protocol. Incontinent. PRN zofran given for nausea. Scheduled pain meds with relief. NPO at midnight for EGD. Needs met at this time. Bed locked and in lowest position, call light within reach. Plan:  EGD  Problem: SAFETY ADULT - FALL  Goal: Free from fall injury  Description: INTERVENTIONS:  - Assess pt frequently for physical needs  - Identify cognitive and physical deficits and behaviors that affect risk of falls. - [place] fall precautions as indicated by assessment.  - Educate pt/family on patient safety including physical limitations  - Instruct pt to call for assistance with activity based on assessment  - Modify environment to reduce risk of injury  - Provide assistive devices as appropriate  - Consider OT/PT consult to assist with strengthening/mobility  - Encourage toileting schedule  Outcome: Progressing     Problem: CARDIOVASCULAR - ADULT  Goal: Maintains optimal cardiac output and hemodynamic stability  Description: INTERVENTIONS:  - Monitor vital signs, rhythm, and trends  - Monitor for bleeding, hypotension and signs of decreased cardiac output  - Evaluate effectiveness of vasoactive medications to optimize hemodynamic stability  - Monitor arterial and/or venous puncture sites for bleeding and/or hematoma  - Assess quality of pulses, skin color and temperature  - Assess for signs of decreased coronary artery perfusion - ex.  Angina  - Evaluate fluid balance, assess for edema, trend weights  Outcome: Progressing  Goal: Absence of cardiac arrhythmias or at baseline  Description: INTERVENTIONS:  - Continuous cardiac monitoring, monitor vital signs, obtain 12 lead EKG if indicated  - Evaluate effectiveness of antiarrhythmic and heart rate control medications as ordered  - Initiate emergency measures for life threatening arrhythmias  - Monitor electrolytes and administer replacement therapy as ordered  Outcome: Progressing

## 2023-06-15 NOTE — PLAN OF CARE
Shift Note:  Assumed care of patient. Patient is lethargic but oriented x2/3, very soft spoken, delayed responses, will occasionally open eyes to speech or pain. Appears uncomfortable. Pt still complaining of nausea, unable to hold food down, including water. Has has one episode of vomiting after drinking fluids -- clear with no sign of blood. Patient on room air, denies difficulty breathing, lung sounds diminished. Denies any cardiac symptoms, Afib with controlled HR in 80s on tele. Denies pain at this time. Feels comfortable as far as nausea goes, has patient bin in case of vomiting episode. Incontinent of bowel and bladder  Patient resting in bed, unable to tolerate much activity, q2 turn, call light within reach, tolerating care well.     1400:  Patient okay to eat again, per GI will do EGD poss ~2 days pending his INR. POC:  - Poss EGD today pending GI rec, INR elevated this AM  - Poss palliative care discussion with family?

## 2023-06-15 NOTE — CONSULTS
[facility] Dosing Service  Warfarin (Coumadin) Subsequent Dosing    Rodriguez Garcia is a 74year old patient for whom pharmacy is dosing warfarin (Coumadin). Goal INR is 2-3    Recent Labs   Lab 06/13/23  0523 06/14/23  0556 06/15/23  0602   INR 1.62* 1.68* 3.03*       Consulted by: DONNA Moreau  Indication:  afib and possible L atrial appendage thrombus   Potential Drug Interactions: none  Other Anticoagulants:  heparin infusion   Home regimen (if applicable): n/a     Inpatient Dosing History:    Date 6/13 6/14  6/15         INR 1.62 1.68  3.03         Coumadin dose 5 mg 6 mg  -not given                   Based on above -  1. For today, Hold warfarin (COUMADIN) dose  2   PT/INR ordered daily while on warfarin  3. Pharmacy will continue to follow. We appreciate the opportunity to assist in the care of this patient.     Baron Rodriguez, PharmD  6/15/2023  2:26 PM

## 2023-06-15 NOTE — OCCUPATIONAL THERAPY NOTE
Attempted to see pt for skilled OT services this date. Pt declined therapy services this date due to increased pain. RN made aware of attempt. Will follow-up later today as schedule permits. CBC ordered for Wednesday     Looked at rad tab and order placed for US kidneys and bladder  [id number]  US KIDNEY(S) AND BLADDER URINARY TRACT  ABNORMAL RENAL FUNCTION    Service to nephrology order placed    Service to PharmD pended    Pended glimepiride 1 mg for one 90 day for [place] (need to check with pt to verify pharmacy)    Called pt Message left on voice mail to call back.

## 2023-06-15 NOTE — PHYSICAL THERAPY NOTE
Attempted to see Pt this AM - RN aware of attempt. Pt refused to participate in OOB mobility or exercises. Writer offered repositioning - but declined. Will f/u later today if time permits, after all other patients are attempted per tentative schedule.

## 2023-06-16 ENCOUNTER — HOSPITAL ENCOUNTER (INPATIENT)
Dept: RADIATION ONCOLOGY | Facility: HOSPITAL | Age: 68
Discharge: HOME | End: 2023-06-16
Attending: RADIOLOGY
Payer: MEDICARE

## 2023-06-16 ENCOUNTER — TELEPHONE (OUTPATIENT)
Dept: HEMATOLOGY/ONCOLOGY | Facility: HOSPITAL | Age: 68
End: 2023-06-16

## 2023-06-16 DIAGNOSIS — C80.1 MENINGEAL CARCINOMATOSIS (HCC): Primary | ICD-10-CM

## 2023-06-16 DIAGNOSIS — C79.49 MENINGEAL CARCINOMATOSIS (HCC): Primary | ICD-10-CM

## 2023-06-16 LAB
C DIFF TOX B STL QL: NEGATIVE
GLUCOSE BLD-MCNC: 110 MG/DL (ref 70–99)
GLUCOSE BLD-MCNC: 118 MG/DL (ref 70–99)
GLUCOSE BLD-MCNC: 119 MG/DL (ref 70–99)
GLUCOSE BLD-MCNC: 135 MG/DL (ref 70–99)
INR BLD: 3.85 (ref 0.85–1.16)
PROTHROMBIN TIME: 37.2 SECONDS (ref 11.6–14.8)

## 2023-06-16 PROCEDURE — 99232 SBSQ HOSP IP/OBS MODERATE 35: CPT | Performed by: INTERNAL MEDICINE

## 2023-06-16 PROCEDURE — G0316 PROLNG IP/OBS E/M EA ADDL 15 MIN: HCPCS | Performed by: INTERNAL MEDICINE

## 2023-06-16 PROCEDURE — 99233 SBSQ HOSP IP/OBS HIGH 50: CPT | Performed by: INTERNAL MEDICINE

## 2023-06-16 RX ORDER — SODIUM CHLORIDE 9 MG/ML
INJECTION, SOLUTION INTRAVENOUS ONCE
Status: COMPLETED | OUTPATIENT
Start: 2023-06-16 | End: 2023-06-16

## 2023-06-16 NOTE — TELEPHONE ENCOUNTER
Quin Cortés called with a question for Dr Gianni Sharma. \"He did an inpatient consultation today for my Dad. He mentioned him getting radiation treatment outpatient. I wanted to know if he can start his treatment inpatient? Quin Cortés is requesting a call back at [phone] today.

## 2023-06-16 NOTE — PLAN OF CARE
Assumed care of patient at 1. Alert and oriented 3-4. On room air with adequate O2 saturations. Afib on tele. Rates controlled. Incontinent. Nauseated. Emesis x1 around 2300. Nausea has been mostly controlled with PRN zofran, but when patient drinks Ensure, he seems to vomit right away. Total care. Pain managed with scheduled morphine. ~2230: pt with loose/watery stool. Per Dr. Paul Miranda, ok to send c-diff sample. Awaiting patient to have BM to send down sample. Plan:  -EGD TBD  -Dr. Velázquez Guardian to discuss possible options    Problem: SAFETY ADULT - FALL  Goal: Free from fall injury  Description: INTERVENTIONS:  - Assess pt frequently for physical needs  - Identify cognitive and physical deficits and behaviors that affect risk of falls. - [place] fall precautions as indicated by assessment.  - Educate pt/family on patient safety including physical limitations  - Instruct pt to call for assistance with activity based on assessment  - Modify environment to reduce risk of injury  - Provide assistive devices as appropriate  - Consider OT/PT consult to assist with strengthening/mobility  - Encourage toileting schedule  Outcome: Progressing     Problem: CARDIOVASCULAR - ADULT  Goal: Maintains optimal cardiac output and hemodynamic stability  Description: INTERVENTIONS:  - Monitor vital signs, rhythm, and trends  - Monitor for bleeding, hypotension and signs of decreased cardiac output  - Evaluate effectiveness of vasoactive medications to optimize hemodynamic stability  - Monitor arterial and/or venous puncture sites for bleeding and/or hematoma  - Assess quality of pulses, skin color and temperature  - Assess for signs of decreased coronary artery perfusion - ex.  Angina  - Evaluate fluid balance, assess for edema, trend weights  Outcome: Progressing  Goal: Absence of cardiac arrhythmias or at baseline  Description: INTERVENTIONS:  - Continuous cardiac monitoring, monitor vital signs, obtain 12 lead EKG if indicated  - Evaluate effectiveness of antiarrhythmic and heart rate control medications as ordered  - Initiate emergency measures for life threatening arrhythmias  - Monitor electrolytes and administer replacement therapy as ordered  Outcome: Progressing

## 2023-06-16 NOTE — PLAN OF CARE
Shift Note:  Assumed care of patient. Patient still lethargic, low energy, mostly Turkmen speaking, oriented x2/3, very soft spoken, delayed responses, will occasionally open eyes to speech or pain. Appears uncomfortable. Pt mild moist cough, will cough up phlegm with scant blood mixed in. Patient on room air, denies difficulty breathing, lung sounds diminished. Denies any cardiac symptoms, Afib with controlled HR in 80s on tele. Denies pain at this time, receives scheduled Morphine. Very poor appetite, encouraged to drink his Ensures. Incontinent of bowel and bladder, last BM today - loose, Cdiff (-). Patient resting in bed, call light within reach, tolerating care well. POC:  - No scopes at this time, INR elevated this AM  - Poss palliative care discussion with family?  Monitor patient performance

## 2023-06-16 NOTE — CONSULTS
[facility] Dosing Service  Warfarin (Coumadin) Subsequent Dosing    Polina Nesbitt is a 74year old patient for whom pharmacy is dosing warfarin (Coumadin). Goal INR is 2-3    Recent Labs   Lab 06/13/23  0523 06/14/23  0556 06/15/23  0602 06/16/23  0554   INR 1.62* 1.68* 3.03* 3.85*       Consulted by: DONNA Cardona  Indication:  afib and possible L atrial appendage thrombus   Potential Drug Interactions: none  Other Anticoagulants:  heparin infusion   Home regimen (if applicable): n/a     Inpatient Dosing History:     Date 6/13 6/14  6/15 6/16        INR 1.62 1.68  3.03  3.85       Coumadin dose 5 mg 6 mg  -not given   Held                    Based on above -  1. For today, Hold warfarin (COUMADIN) dose  2   PT/INR ordered daily while on warfarin  3. Pharmacy will continue to follow. We appreciate the opportunity to assist in the care of this patient.     Marianne Gonzalez, PharmHAI  6/16/2023  2:50 PM

## 2023-06-17 LAB
ANION GAP SERPL CALC-SCNC: 9 MMOL/L (ref 0–18)
BUN BLD-MCNC: 6 MG/DL (ref 7–18)
CALCIUM BLD-MCNC: 8.3 MG/DL (ref 8.5–10.1)
CHLORIDE SERPL-SCNC: 103 MMOL/L (ref 98–112)
CO2 SERPL-SCNC: 20 MMOL/L (ref 21–32)
CREAT BLD-MCNC: 0.41 MG/DL
ERYTHROCYTE [DISTWIDTH] IN BLOOD BY AUTOMATED COUNT: 15.5 %
FREE TESTOST DIRECT: 1.6 PG/ML
GFR SERPLBLD BASED ON 1.73 SQ M-ARVRAT: 120 ML/MIN/1.73M2 (ref 60–?)
GLUCOSE BLD-MCNC: 141 MG/DL (ref 70–99)
GLUCOSE BLD-MCNC: 145 MG/DL (ref 70–99)
GLUCOSE BLD-MCNC: 146 MG/DL (ref 70–99)
GLUCOSE BLD-MCNC: 165 MG/DL (ref 70–99)
GLUCOSE BLD-MCNC: 180 MG/DL (ref 70–99)
HCT VFR BLD AUTO: 24 %
HGB BLD-MCNC: 7.3 G/DL
INR BLD: 3.23 (ref 0.85–1.16)
MCH RBC QN AUTO: 25.9 PG (ref 26–34)
MCHC RBC AUTO-ENTMCNC: 30.4 G/DL (ref 31–37)
MCV RBC AUTO: 85.1 FL
OSMOLALITY SERPL CALC.SUM OF ELEC: 274 MOSM/KG (ref 275–295)
PLATELET # BLD AUTO: 197 10(3)UL (ref 150–450)
POTASSIUM SERPL-SCNC: 3.6 MMOL/L (ref 3.5–5.1)
PROTHROMBIN TIME: 32.6 SECONDS (ref 11.6–14.8)
RBC # BLD AUTO: 2.82 X10(6)UL
SODIUM SERPL-SCNC: 132 MMOL/L (ref 136–145)
TESTOSTERONE: 11 NG/DL
WBC # BLD AUTO: 3.4 X10(3) UL (ref 4–11)

## 2023-06-17 PROCEDURE — 99232 SBSQ HOSP IP/OBS MODERATE 35: CPT | Performed by: INTERNAL MEDICINE

## 2023-06-17 NOTE — PROGRESS NOTES
Tired - resting nearly supine - lethargic    Tele trial fibrillation - rates controlled    Afebrile  109/65  79 irregular    Lungs clear anteriorly  Ht irreg  abd soft  Ext no edema  Skin pale      Hgb 7.3   INR 3.2      A/P:  Atrial fibrillation with preserved LV function and mild-moderate mitral stenosis. Associated metastatic prostate cancer. Given extent of leptomeningeal involvement and MRI findings of small sub-arachnoid hemorrhage I would agree in holding anticoagulation    I am not convinced that [place] thrombus is present as the CT was a non-gated study and not protocolized for evaluating cardiac structures.  There is a high likelihood the finding is artifactual    Adequate rate control at present on beta blocker        L3

## 2023-06-17 NOTE — PLAN OF CARE
Patient alert and oriented x 2-3. On RA. Up with max assist. Afib on tele. Incontinent of bowel/bladder. No complaints of shortness of breath, chest pain/discomfort. Scheduled pain meds given. POC: monitor INR/Hgb. EGD TBD. Call light within reach. Fall precautions in place. Problem: SAFETY ADULT - FALL  Goal: Free from fall injury  Description: INTERVENTIONS:  - Assess pt frequently for physical needs  - Identify cognitive and physical deficits and behaviors that affect risk of falls. - [place] fall precautions as indicated by assessment.  - Educate pt/family on patient safety including physical limitations  - Instruct pt to call for assistance with activity based on assessment  - Modify environment to reduce risk of injury  - Provide assistive devices as appropriate  - Consider OT/PT consult to assist with strengthening/mobility  - Encourage toileting schedule  Outcome: Progressing     Problem: CARDIOVASCULAR - ADULT  Goal: Maintains optimal cardiac output and hemodynamic stability  Description: INTERVENTIONS:  - Monitor vital signs, rhythm, and trends  - Monitor for bleeding, hypotension and signs of decreased cardiac output  - Evaluate effectiveness of vasoactive medications to optimize hemodynamic stability  - Monitor arterial and/or venous puncture sites for bleeding and/or hematoma  - Assess quality of pulses, skin color and temperature  - Assess for signs of decreased coronary artery perfusion - ex.  Angina  - Evaluate fluid balance, assess for edema, trend weights  Outcome: Progressing  Goal: Absence of cardiac arrhythmias or at baseline  Description: INTERVENTIONS:  - Continuous cardiac monitoring, monitor vital signs, obtain 12 lead EKG if indicated  - Evaluate effectiveness of antiarrhythmic and heart rate control medications as ordered  - Initiate emergency measures for life threatening arrhythmias  - Monitor electrolytes and administer replacement therapy as ordered  Outcome: Progressing normal...

## 2023-06-17 NOTE — CONSULTS
[facility] Dosing Service  Warfarin (Coumadin) Subsequent Dosing    Caridad Ballard is a 74year old patient for whom pharmacy is dosing warfarin (Coumadin). Goal INR is 2-3    Recent Labs   Lab 06/13/23  0523 06/14/23  0556 06/15/23  0602 06/16/23  0554 06/17/23  0607   INR 1.62* 1.68* 3.03* 3.85* 3.23*       Consulted by: DONNA Gamez  Indication:  afib and possible L atrial appendage thrombus   Potential Drug Interactions: none  Other Anticoagulants:  heparin infusion   Home regimen (if applicable): n/a     Inpatient Dosing History:     Date 6/13 6/14  6/15 6/16   6/17     INR 1.62 1.68  3.03  3.85  3.23     Coumadin dose 5 mg 6 mg  -not given   Held  Held                           Based on above -  1. For today, Hold warfarin (COUMADIN) dose  2   PT/INR ordered daily while on warfarin  3. Pharmacy will continue to follow. We appreciate the opportunity to assist in the care of this patient.     Chriss Elizalde, PharmD  6/17/2023  8:50 AM

## 2023-06-17 NOTE — PLAN OF CARE
Assumed care of patient at 1. Patient is alert and orientated x3. Currently room air. Lung sounds clear/diminshed. Continuous pulse ox maintained. Afib on tele. Incontinent of bowel and bladder. PRN zofran given for nausea. No complaints of pain. Call light within reach. Fall precautions in place. Plan of care:  Monitor nausea   Pain management     Problem: SAFETY ADULT - FALL  Goal: Free from fall injury  Description: INTERVENTIONS:  - Assess pt frequently for physical needs  - Identify cognitive and physical deficits and behaviors that affect risk of falls. - [place] fall precautions as indicated by assessment.  - Educate pt/family on patient safety including physical limitations  - Instruct pt to call for assistance with activity based on assessment  - Modify environment to reduce risk of injury  - Provide assistive devices as appropriate  - Consider OT/PT consult to assist with strengthening/mobility  - Encourage toileting schedule  Outcome: Progressing     Problem: CARDIOVASCULAR - ADULT  Goal: Maintains optimal cardiac output and hemodynamic stability  Description: INTERVENTIONS:  - Monitor vital signs, rhythm, and trends  - Monitor for bleeding, hypotension and signs of decreased cardiac output  - Evaluate effectiveness of vasoactive medications to optimize hemodynamic stability  - Monitor arterial and/or venous puncture sites for bleeding and/or hematoma  - Assess quality of pulses, skin color and temperature  - Assess for signs of decreased coronary artery perfusion - ex.  Angina  - Evaluate fluid balance, assess for edema, trend weights  Outcome: Progressing  Goal: Absence of cardiac arrhythmias or at baseline  Description: INTERVENTIONS:  - Continuous cardiac monitoring, monitor vital signs, obtain 12 lead EKG if indicated  - Evaluate effectiveness of antiarrhythmic and heart rate control medications as ordered  - Initiate emergency measures for life threatening arrhythmias  - Monitor electrolytes and administer replacement therapy as ordered  Outcome: Progressing

## 2023-06-18 LAB
BASOPHILS # BLD: 0.04 X10(3) UL (ref 0–0.2)
BASOPHILS NFR BLD: 1 %
EOSINOPHIL # BLD: 0 X10(3) UL (ref 0–0.7)
EOSINOPHIL NFR BLD: 0 %
ERYTHROCYTE [DISTWIDTH] IN BLOOD BY AUTOMATED COUNT: 15.6 %
GLUCOSE BLD-MCNC: 114 MG/DL (ref 70–99)
GLUCOSE BLD-MCNC: 121 MG/DL (ref 70–99)
GLUCOSE BLD-MCNC: 146 MG/DL (ref 70–99)
GLUCOSE BLD-MCNC: 162 MG/DL (ref 70–99)
HAPTOGLOB SERPL-MCNC: 308 MG/DL (ref 30–200)
HCT VFR BLD AUTO: 23.9 %
HGB BLD-MCNC: 7.2 G/DL
HGB RETIC QN AUTO: 26.6 PG (ref 28.2–36.6)
IMM RETICS NFR: 0.31 RATIO (ref 0.1–0.3)
INR BLD: 2.93 (ref 0.85–1.16)
LDH SERPL L TO P-CCNC: 856 U/L
LYMPHOCYTES NFR BLD: 0.7 X10(3) UL (ref 1–4)
LYMPHOCYTES NFR BLD: 20 %
MCH RBC QN AUTO: 25.4 PG (ref 26–34)
MCHC RBC AUTO-ENTMCNC: 30.1 G/DL (ref 31–37)
MCV RBC AUTO: 84.2 FL
MONOCYTES # BLD: 0.25 X10(3) UL (ref 0.1–1)
MONOCYTES NFR BLD: 7 %
MORPHOLOGY: NORMAL
NEUTROPHILS # BLD AUTO: 2.09 X10 (3) UL (ref 1.5–7.7)
NEUTROPHILS NFR BLD: 70 %
NEUTS BAND NFR BLD: 2 %
NEUTS HYPERSEG # BLD: 2.52 X10(3) UL (ref 1.5–7.7)
NRBC BLD MANUAL-RTO: 1 %
PLATELET # BLD AUTO: 198 10(3)UL (ref 150–450)
PLATELET MORPHOLOGY: NORMAL
PROTHROMBIN TIME: 30.1 SECONDS (ref 11.6–14.8)
RBC # BLD AUTO: 2.84 X10(6)UL
RETICS # AUTO: 84.6 X10(3) UL (ref 22.5–147.5)
RETICS/RBC NFR AUTO: 3 %
TOTAL CELLS COUNTED BLD: 100
WBC # BLD AUTO: 3.5 X10(3) UL (ref 4–11)

## 2023-06-18 PROCEDURE — 99232 SBSQ HOSP IP/OBS MODERATE 35: CPT | Performed by: INTERNAL MEDICINE

## 2023-06-18 NOTE — PLAN OF CARE
A&OX3. Scheduled morphine for pain. Room air. Premo fit. Max assit. QID. Plan for EGD when INR in range. Problem: SAFETY ADULT - FALL  Goal: Free from fall injury  Description: INTERVENTIONS:  - Assess pt frequently for physical needs  - Identify cognitive and physical deficits and behaviors that affect risk of falls.   - [place] fall precautions as indicated by assessment.  - Educate pt/family on patient safety including physical limitations  - Instruct pt to call for assistance with activity based on assessment  - Modify environment to reduce risk of injury  - Provide assistive devices as appropriate  - Consider OT/PT consult to assist with strengthening/mobility  - Encourage toileting schedule  Outcome: Progressing

## 2023-06-18 NOTE — PLAN OF CARE
Patient alert and oriented x 3. On RA. Up with max assist. Afib on tele. Incontinent of bowel/bladder. No complaints of shortness of breath, chest pain/discomfort. POC: monitor INR/Hgb, monitor for emesis. Low appetites, encourage PO intake. Call light within reach. Fall precautions in place. Problem: SAFETY ADULT - FALL  Goal: Free from fall injury  Description: INTERVENTIONS:  - Assess pt frequently for physical needs  - Identify cognitive and physical deficits and behaviors that affect risk of falls. - [place] fall precautions as indicated by assessment.  - Educate pt/family on patient safety including physical limitations  - Instruct pt to call for assistance with activity based on assessment  - Modify environment to reduce risk of injury  - Provide assistive devices as appropriate  - Consider OT/PT consult to assist with strengthening/mobility  - Encourage toileting schedule  Outcome: Progressing     Problem: CARDIOVASCULAR - ADULT  Goal: Maintains optimal cardiac output and hemodynamic stability  Description: INTERVENTIONS:  - Monitor vital signs, rhythm, and trends  - Monitor for bleeding, hypotension and signs of decreased cardiac output  - Evaluate effectiveness of vasoactive medications to optimize hemodynamic stability  - Monitor arterial and/or venous puncture sites for bleeding and/or hematoma  - Assess quality of pulses, skin color and temperature  - Assess for signs of decreased coronary artery perfusion - ex.  Angina  - Evaluate fluid balance, assess for edema, trend weights  Outcome: Progressing  Goal: Absence of cardiac arrhythmias or at baseline  Description: INTERVENTIONS:  - Continuous cardiac monitoring, monitor vital signs, obtain 12 lead EKG if indicated  - Evaluate effectiveness of antiarrhythmic and heart rate control medications as ordered  - Initiate emergency measures for life threatening arrhythmias  - Monitor electrolytes and administer replacement therapy as ordered  Outcome: Progressing

## 2023-06-18 NOTE — PHYSICAL THERAPY NOTE
Attempted to see pt for PT services. Pt is occupied with nursing care at this time. Will attempt to see pt at a later time as schedule permits and as pt is appropriate.

## 2023-06-19 ENCOUNTER — TELEPHONE (OUTPATIENT)
Dept: HEMATOLOGY/ONCOLOGY | Facility: HOSPITAL | Age: 68
End: 2023-06-19

## 2023-06-19 LAB
ANION GAP SERPL CALC-SCNC: 6 MMOL/L (ref 0–18)
BASOPHILS # BLD: 0.04 X10(3) UL (ref 0–0.2)
BASOPHILS NFR BLD: 1 %
BUN BLD-MCNC: 7 MG/DL (ref 7–18)
CALCIUM BLD-MCNC: 8.1 MG/DL (ref 8.5–10.1)
CHLORIDE SERPL-SCNC: 102 MMOL/L (ref 98–112)
CO2 SERPL-SCNC: 23 MMOL/L (ref 21–32)
CREAT BLD-MCNC: 0.44 MG/DL
EOSINOPHIL # BLD: 0.07 X10(3) UL (ref 0–0.7)
EOSINOPHIL NFR BLD: 2 %
ERYTHROCYTE [DISTWIDTH] IN BLOOD BY AUTOMATED COUNT: 15.5 %
GFR SERPLBLD BASED ON 1.73 SQ M-ARVRAT: 118 ML/MIN/1.73M2 (ref 60–?)
GLUCOSE BLD-MCNC: 134 MG/DL (ref 70–99)
GLUCOSE BLD-MCNC: 138 MG/DL (ref 70–99)
GLUCOSE BLD-MCNC: 145 MG/DL (ref 70–99)
GLUCOSE BLD-MCNC: 150 MG/DL (ref 70–99)
GLUCOSE BLD-MCNC: 163 MG/DL (ref 70–99)
HCT VFR BLD AUTO: 25.7 %
HGB BLD-MCNC: 7.8 G/DL
INR BLD: 1.39 (ref 0.85–1.16)
LYMPHOCYTES NFR BLD: 0.67 X10(3) UL (ref 1–4)
LYMPHOCYTES NFR BLD: 18 %
MCH RBC QN AUTO: 25.6 PG (ref 26–34)
MCHC RBC AUTO-ENTMCNC: 30.4 G/DL (ref 31–37)
MCV RBC AUTO: 84.3 FL
MONOCYTES # BLD: 0.41 X10(3) UL (ref 0.1–1)
MONOCYTES NFR BLD: 11 %
MORPHOLOGY: NORMAL
MYELOCYTES # BLD: 0.04 X10(3) UL
MYELOCYTES NFR BLD: 1 %
NEUTROPHILS # BLD AUTO: 2.22 X10 (3) UL (ref 1.5–7.7)
NEUTROPHILS NFR BLD: 57 %
NEUTS BAND NFR BLD: 10 %
NEUTS HYPERSEG # BLD: 2.48 X10(3) UL (ref 1.5–7.7)
NRBC BLD MANUAL-RTO: 4 %
OSMOLALITY SERPL CALC.SUM OF ELEC: 273 MOSM/KG (ref 275–295)
PLATELET # BLD AUTO: 194 10(3)UL (ref 150–450)
PLATELET MORPHOLOGY: NORMAL
POTASSIUM SERPL-SCNC: 3.7 MMOL/L (ref 3.5–5.1)
PROTHROMBIN TIME: 17 SECONDS (ref 11.6–14.8)
RBC # BLD AUTO: 3.05 X10(6)UL
SODIUM SERPL-SCNC: 131 MMOL/L (ref 136–145)
TOTAL CELLS COUNTED BLD: 100
WBC # BLD AUTO: 3.7 X10(3) UL (ref 4–11)

## 2023-06-19 PROCEDURE — 99232 SBSQ HOSP IP/OBS MODERATE 35: CPT | Performed by: INTERNAL MEDICINE

## 2023-06-19 PROCEDURE — 99233 SBSQ HOSP IP/OBS HIGH 50: CPT | Performed by: INTERNAL MEDICINE

## 2023-06-19 RX ORDER — GABAPENTIN 250 MG/5ML
100 SOLUTION ORAL DAILY
Status: DISCONTINUED | OUTPATIENT
Start: 2023-06-19 | End: 2023-06-26

## 2023-06-19 NOTE — SPIRITUAL CARE NOTE
Spiritual Care Visit Note    Visited With: Patient    Spiritual Care Taxonomy:    Intended Effects: Demonstrate caring and concern    Methods: Offer support    Interventions: Acknowledge current situation; Active listening      Brief visit. No needs at this time. Spiritual Care support can be requested via an Epic consult. For urgent/immediate needs, please contact the On Call  at ext. [phone]. Rev.      Dorothy Miranda 38, [address] [employer]  ext. [phone]

## 2023-06-19 NOTE — TELEPHONE ENCOUNTER
Called son 2nd time and spoke to him for over 30 minutes. He still continues to have very unrealistic expectations. Wants frequent spinal taps to drain CSF. Wants all of us to re-examine dad when he is there  so he can make his father do more physical activity and show us how strong he is. Told son multiple times that father is not a candidate for chemotherapy at present. Son does not appear to understand gravity of situation. Frankie Nathan M.D.     THE MED[facility] [place] [facility] Hematology Oncology [facility] Course  [address], [place], [phone]

## 2023-06-19 NOTE — TELEPHONE ENCOUNTER
Attempted to contact son who had wanted to speak with me. Went to [employer], left message. Estelle Mccollum M.D.     [facility] Hematology Oncology [facility] [address], [place], [phone]

## 2023-06-19 NOTE — PLAN OF CARE
Patient aox3, very fatigue, sleeping a lot, turns to the side. Drank ensure but poor appetite, Afib controlled, +3 edema to lower extremities. FULL CODE. No anticoagulation d/t high risk bleed d/t metastasis. Son called this morning and wanted to speak with Dr Edmundo Chou. Dr Edmundo Chou will communicate with son today   Currently is full code. Poor prognosis. Palliative and Hospice discussed with family but no decision has been made. Awaiting family to make a decision home or [facility] Palliative consult, came by today and will FU tomorrow. Camilo Isbell

## 2023-06-19 NOTE — PLAN OF CARE
Alert and oriented x3. Amharic speaking. On RA. Denies any SOB or chest pain. A fib on tele, rates controlled. Pain controlled with scheduled meds. 2 assist. QID accucheck. Fall precautions in place. Family at bedside. Problem: SAFETY ADULT - FALL  Goal: Free from fall injury  Description: INTERVENTIONS:  - Assess pt frequently for physical needs  - Identify cognitive and physical deficits and behaviors that affect risk of falls. - [place] fall precautions as indicated by assessment.  - Educate pt/family on patient safety including physical limitations  - Instruct pt to call for assistance with activity based on assessment  - Modify environment to reduce risk of injury  - Provide assistive devices as appropriate  - Consider OT/PT consult to assist with strengthening/mobility  - Encourage toileting schedule  Outcome: Progressing     Problem: CARDIOVASCULAR - ADULT  Goal: Maintains optimal cardiac output and hemodynamic stability  Description: INTERVENTIONS:  - Monitor vital signs, rhythm, and trends  - Monitor for bleeding, hypotension and signs of decreased cardiac output  - Evaluate effectiveness of vasoactive medications to optimize hemodynamic stability  - Monitor arterial and/or venous puncture sites for bleeding and/or hematoma  - Assess quality of pulses, skin color and temperature  - Assess for signs of decreased coronary artery perfusion - ex.  Angina  - Evaluate fluid balance, assess for edema, trend weights  Outcome: Progressing  Goal: Absence of cardiac arrhythmias or at baseline  Description: INTERVENTIONS:  - Continuous cardiac monitoring, monitor vital signs, obtain 12 lead EKG if indicated  - Evaluate effectiveness of antiarrhythmic and heart rate control medications as ordered  - Initiate emergency measures for life threatening arrhythmias  - Monitor electrolytes and administer replacement therapy as ordered  Outcome: Progressing

## 2023-06-19 NOTE — CM/SW NOTE
Spoke with patient's son Barron Kim) to discuss discharge planning. He states he is still awaiting a call back from Dr. Keyla Cartwright to discuss oncology plan. SW asked [place] Sharmila if he would be ok with Palliative Care consultation. He was agreeable and would like Palliative Care to contact him. Updated Dr. Keyla Cartwright. Will need Palliative Care consult order. SW/JOSUÉ to remain available for support and/or discharge planning.     Kamron Boateng, SONY  Discharge Planner  [phone]

## 2023-06-20 LAB
BASOPHILS # BLD: 0 X10(3) UL (ref 0–0.2)
BASOPHILS NFR BLD: 0 %
EOSINOPHIL # BLD: 0 X10(3) UL (ref 0–0.7)
EOSINOPHIL NFR BLD: 0 %
ERYTHROCYTE [DISTWIDTH] IN BLOOD BY AUTOMATED COUNT: 15.6 %
GLUCOSE BLD-MCNC: 127 MG/DL (ref 70–99)
GLUCOSE BLD-MCNC: 152 MG/DL (ref 70–99)
GLUCOSE BLD-MCNC: 187 MG/DL (ref 70–99)
GLUCOSE BLD-MCNC: 191 MG/DL (ref 70–99)
HCT VFR BLD AUTO: 24.9 %
HGB BLD-MCNC: 7.7 G/DL
INR BLD: 1.29 (ref 0.85–1.16)
LYMPHOCYTES NFR BLD: 0.53 X10(3) UL (ref 1–4)
LYMPHOCYTES NFR BLD: 14 %
MCH RBC QN AUTO: 25.8 PG (ref 26–34)
MCHC RBC AUTO-ENTMCNC: 30.9 G/DL (ref 31–37)
MCV RBC AUTO: 83.6 FL
MONOCYTES # BLD: 0.3 X10(3) UL (ref 0.1–1)
MONOCYTES NFR BLD: 8 %
MORPHOLOGY: NORMAL
NEUTROPHILS # BLD AUTO: 2.41 X10 (3) UL (ref 1.5–7.7)
NEUTROPHILS NFR BLD: 74 %
NEUTS BAND NFR BLD: 4 %
NEUTS HYPERSEG # BLD: 2.96 X10(3) UL (ref 1.5–7.7)
NRBC BLD MANUAL-RTO: 1 %
PLATELET # BLD AUTO: 198 10(3)UL (ref 150–450)
PLATELET MORPHOLOGY: NORMAL
PROTHROMBIN TIME: 16 SECONDS (ref 11.6–14.8)
RBC # BLD AUTO: 2.98 X10(6)UL
TOTAL CELLS COUNTED BLD: 100
WBC # BLD AUTO: 3.8 X10(3) UL (ref 4–11)

## 2023-06-20 PROCEDURE — 99233 SBSQ HOSP IP/OBS HIGH 50: CPT | Performed by: INTERNAL MEDICINE

## 2023-06-20 PROCEDURE — 99232 SBSQ HOSP IP/OBS MODERATE 35: CPT | Performed by: INTERNAL MEDICINE

## 2023-06-20 PROCEDURE — 99223 1ST HOSP IP/OBS HIGH 75: CPT | Performed by: NURSE PRACTITIONER

## 2023-06-20 RX ORDER — BENZONATATE 100 MG/1
100 CAPSULE ORAL 3 TIMES DAILY PRN
Status: DISCONTINUED | OUTPATIENT
Start: 2023-06-20 | End: 2023-06-26

## 2023-06-20 RX ORDER — SCOLOPAMINE TRANSDERMAL SYSTEM 1 MG/1
1 PATCH, EXTENDED RELEASE TRANSDERMAL
Status: DISCONTINUED | OUTPATIENT
Start: 2023-06-20 | End: 2023-06-26

## 2023-06-20 NOTE — CM/SW NOTE
Spoke with patient's son Gretta Sánchez) to discuss discharge planning. He confirmed he was able to get in touch with Dr. Sarah Medrano yesterday. Discussed discharge planning such as; [facility], home with [facility] vs home with hospice care. He states they would like to try for [facility]. Explained differences between [address] [place], [place] [address]ly [place] and [facility]. Explained that patient may not be appropriate for AR anymore and will have PMR review case for determination. He was agreeable with SW sending W[facility]EN referrals as back up plan. He confirmed there would be no cancer treatment while patient is at rehab. He had questions regarding EGD status, informed Dr. Luis Roman. Sent message to Cristobal Chavez liaison to see if PMR can review to see if patient is still appropriate for AR. She will have MD follow up/put in note with determination. Sent WARREN referrals in aidin. PASRR completed, letter uploaded to aidin. Await determination from PMR, accepting WARREN list, patient/family choice. SW/CM to remain available for support and/or discharge planning. Addendum 2:15pm: Met with patient and son Gretta Sánchez) at bedside to discuss discharge planning. Provided update that PMR is recommending [facility] at this time. Reiterated AR requirements such as being able to participate in intense 3 hours of therapy each day and not currently appropriate for that level of therapy. He requests call from Dr. Tone Guillermo. Provided accepting W[facility] list. Encouraged him to review and provide SW with choice as soon as possible. [facility]  to see if they could reach out to son. She or Dr. Yann Bautista will follow up with Tesha Delvalle. 4pm: Spoke with Melody Garsia from [facility] , who spoke to Tesha Delvalle on Dr. Lisandro Bennett behalf, patient's son shared that \"PT was just in and he did better and they want acute for him\". Melody Garsia will have Dr. Brenden Bravo come again tomorrow to see patient.        Kam Hernandez, [facility]  Discharge Planner  [phone]

## 2023-06-20 NOTE — CONSULTS
[address]    PATIENT'S NAME: Selene Manny BARNHART   RADIATION ONCOLOGIST: Sarah Hassan. Terrie Scott M.D. PATIENT ACCOUNT #: [de-identified] Deisy Monroy   [facility]   MEDICAL RECORD #: [id number] YOB: 1955   CONSULTATION DATE: 06/16/2023       RADIATION ONCOLOGY CONSULTATION    REFERRING PHYSICIAN:  Laurence Engle MD    DIAGNOSIS:  Diffusely metastatic prostate cancer, castrate resistant. HISTORY OF PRESENT ILLNESS:  The patient is a 72-year-old male with a history of metastatic prostate cancer. His treatment has been done at an outside institution, but he was initially treated with docetaxel and ADT, and apparently had a good response. He was then on apalutamide and at one point did receive palliative radiotherapy to his spine. Recently, he became increasingly weak and was having progressive nausea and vomiting. He was seen in the emergency room and a CT scan of the chest, abdomen, and pelvis was performed. This revealed mediastinal and hilar lymphadenopathy which was increasing, as well as increasing bilateral adrenal metastases and retroperitoneal and pelvic lymphadenopathy. There were diffuse osseous metastases similar to prior imaging. His rising PSA of 154, coupled with his progressive disease on imaging, indicates castrate resistance. Dr. Avel Bellamy discussed the possibility of chemotherapy, including cabazitaxel, but only if the patient's performance status improved. Based upon intractable nausea and vomiting, an MRI was obtained during his hospitalization on 06/14/2023. 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 There was also a low T1 signal with focal enhancement involving the clivus and bilateral skull in the parietal and frontal bones concerning for metastatic bony disease and extension to the left frontal leptomeninges.   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 PAST MEDICAL HISTORY:  The patient has a past history of prostate cancer as per HPI as well as mitral stenosis, cardiac arrhythmias, and diabetes. PAST SURGICAL HISTORY:  None. MEDICATIONS:  Colchicine, dexamethasone, Erleada, famotidine, finasteride, gabapentin, metformin, metoprolol, morphine, ondansetron, oxycodone, and Flomax. ALLERGIES:  No known drug allergies. FAMILY HISTORY:  Negative for malignancy. SOCIAL HISTORY:  The patient is a never-smoker who reports no alcohol use. He is seen in consultation in his hospital bed, though he is only somewhat alert and responsive. I did have a long discussion with the patient's son by telephone to gain more information and have further discussions. REVIEW OF SYSTEMS:  A 10-point review of systems is performed. Pertinent positives and negatives are as per HPI. PHYSICAL EXAMINATION:    GENERAL:  A 62-year-old male who is able to answer questions, but not terribly alert. His responses are primarily single word. VITAL SIGNS:  Per chart and are stable. NECK:  Supple with no lymphadenopathy. LUNGS:  Clear to auscultation bilaterally. HEART:  Regular rate and rhythm. LYMPHATICS:  No supraclavicular, axillary, or inguinal lymphadenopathy. ABDOMEN:  Benign. IMPRESSION:  This is a 62-year-old male with diffusely metastatic prostate cancer. He has castrate-resistant disease based upon the fact that his PSA is rising and that he has progressive findings on imaging. He has had intractable nausea and vomiting and changing mental status, and an MRI of the brain shows diffuse leptomeningeal disease. Per Dr. Timothy Escalona, the patient is not a candidate for any systemic treatment based upon his current performance status. RECOMMENDATIONS:  I cannot recommend any radiotherapy interventions at this point.   Once patients have leptomeningeal disease, prognosis is dismal.  Average life expectancy ranges in the number of weeks to perhaps a couple months. Given the diffuse nature of his meningeal disease, any effective treatment would require treating the entire craniospinal axis. This is a fairly exhaustive undertaking and can be replete with numerous side effects, including numerous GI-related problems. This would have a significantly deleterious effect on his bone marrow as well. This type of treatment is rarely done anymore as it was not seen to be terribly efficacious and can be rather morbid for the patient, with little impact upon median survival.    There have been some recent single-institution studies indicate that proton radiotherapy may be more worthwhile in these circumstances. Protons do have the ability to treat the craniospinal axis without causing the same degree of morbidity. There are some reports that this may have some impact on median survival, though the disease continues to be uniformly fatal.  I told the son and the family that if they really wish to be extremely aggressive with his care, they could consider a consultation at the [place] [phone] if they truly wish to pursue all possible options. However, I cautioned them that this is still not going to have a miraculous impact upon his prognosis, and that my recommendation would be for hospice and comfort care. There certainly is not a way that I would offer photon radiation at this point, and told the son and family that I could not, in good conscious, offer that treatment given exceedingly low likelihood of benefit and meaningful risk. The patient's son understood our discussion, but apparently feels pressure from the family to try to exhaust all possible therapeutic options and they are not, at this time, willing to consider end-of-life care and hospice.   I told the son that if the patient really does improve and they can be seen over at the proton facility, they might be willing to offer treatment but that no such treatment is available or would be forthcoming at [facility].    Thank you very much for allowing me the opportunity to participate in the care of this patient. If there should be any questions regarding the radiotherapy, please feel free to contact me at any time. Dictated By Miriam Norris M.D.  d: 06/20/2023 08:38:10  t: 06/20/2023 09:01:14  [facility] [id number]  QJW/    cc: Beverley Stewart M.D.    Dr. Jp Iraheta

## 2023-06-20 NOTE — PLAN OF CARE
Rec'd pt at 0730. A&O x 3-4, primarily Lao speaking. Tele shows a fib. O2 sats adequate on RA. Pt incontinent, brief and primofit in palce. C/O nausea and and pain, prn zofran and scheduled morphine given. Skin dry and intact. Bed locked and in low position, call light and personal items within reach. Will continue to monitor. POC - Discharge planning, deciding on [facility] vs palliative/hospice. Problem: SAFETY ADULT - FALL  Goal: Free from fall injury  Description: INTERVENTIONS:  - Assess pt frequently for physical needs  - Identify cognitive and physical deficits and behaviors that affect risk of falls. - [place] fall precautions as indicated by assessment.  - Educate pt/family on patient safety including physical limitations  - Instruct pt to call for assistance with activity based on assessment  - Modify environment to reduce risk of injury  - Provide assistive devices as appropriate  - Consider OT/PT consult to assist with strengthening/mobility  - Encourage toileting schedule  Outcome: Progressing     Problem: CARDIOVASCULAR - ADULT  Goal: Maintains optimal cardiac output and hemodynamic stability  Description: INTERVENTIONS:  - Monitor vital signs, rhythm, and trends  - Monitor for bleeding, hypotension and signs of decreased cardiac output  - Evaluate effectiveness of vasoactive medications to optimize hemodynamic stability  - Monitor arterial and/or venous puncture sites for bleeding and/or hematoma  - Assess quality of pulses, skin color and temperature  - Assess for signs of decreased coronary artery perfusion - ex.  Angina  - Evaluate fluid balance, assess for edema, trend weights  Outcome: Progressing  Goal: Absence of cardiac arrhythmias or at baseline  Description: INTERVENTIONS:  - Continuous cardiac monitoring, monitor vital signs, obtain 12 lead EKG if indicated  - Evaluate effectiveness of antiarrhythmic and heart rate control medications as ordered  - Initiate emergency measures for life threatening arrhythmias  - Monitor electrolytes and administer replacement therapy as ordered  Outcome: Progressing

## 2023-06-20 NOTE — PLAN OF CARE
Problem: CARDIOVASCULAR - ADULT  Goal: Maintains optimal cardiac output and hemodynamic stability  Description: INTERVENTIONS:  - Monitor vital signs, rhythm, and trends  - Monitor for bleeding, hypotension and signs of decreased cardiac output  - Evaluate effectiveness of vasoactive medications to optimize hemodynamic stability  - Monitor arterial and/or venous puncture sites for bleeding and/or hematoma  - Assess quality of pulses, skin color and temperature  - Assess for signs of decreased coronary artery perfusion - ex. Angina  - Evaluate fluid balance, assess for edema, trend weights  Outcome: Progressing  Goal: Absence of cardiac arrhythmias or at baseline  Description: INTERVENTIONS:  - Continuous cardiac monitoring, monitor vital signs, obtain 12 lead EKG if indicated  - Evaluate effectiveness of antiarrhythmic and heart rate control medications as ordered  - Initiate emergency measures for life threatening arrhythmias  - Monitor electrolytes and administer replacement therapy as ordered  Outcome: Progressing     Problem: SAFETY ADULT - FALL  Goal: Free from fall injury  Description: INTERVENTIONS:  - Assess pt frequently for physical needs  - Identify cognitive and physical deficits and behaviors that affect risk of falls.   - [place] fall precautions as indicated by assessment.  - Educate pt/family on patient safety including physical limitations  - Instruct pt to call for assistance with activity based on assessment  - Modify environment to reduce risk of injury  - Provide assistive devices as appropriate  - Consider OT/PT consult to assist with strengthening/mobility  - Encourage toileting schedule  Outcome: Progressing

## 2023-06-21 LAB
BASOPHILS # BLD: 0 X10(3) UL (ref 0–0.2)
BASOPHILS NFR BLD: 0 %
EOSINOPHIL # BLD: 0 X10(3) UL (ref 0–0.7)
EOSINOPHIL NFR BLD: 0 %
ERYTHROCYTE [DISTWIDTH] IN BLOOD BY AUTOMATED COUNT: 15.7 %
GLUCOSE BLD-MCNC: 120 MG/DL (ref 70–99)
GLUCOSE BLD-MCNC: 147 MG/DL (ref 70–99)
GLUCOSE BLD-MCNC: 172 MG/DL (ref 70–99)
GLUCOSE BLD-MCNC: 190 MG/DL (ref 70–99)
HCT VFR BLD AUTO: 26.1 %
HGB BLD-MCNC: 8 G/DL
INR BLD: 1.34 (ref 0.85–1.16)
LYMPHOCYTES NFR BLD: 0.65 X10(3) UL (ref 1–4)
LYMPHOCYTES NFR BLD: 18 %
MCH RBC QN AUTO: 25.7 PG (ref 26–34)
MCHC RBC AUTO-ENTMCNC: 30.7 G/DL (ref 31–37)
MCV RBC AUTO: 83.9 FL
MONOCYTES # BLD: 0.29 X10(3) UL (ref 0.1–1)
MONOCYTES NFR BLD: 8 %
MORPHOLOGY: NORMAL
NEUTROPHILS # BLD AUTO: 2.3 X10 (3) UL (ref 1.5–7.7)
NEUTROPHILS NFR BLD: 65 %
NEUTS BAND NFR BLD: 9 %
NEUTS HYPERSEG # BLD: 2.66 X10(3) UL (ref 1.5–7.7)
NRBC BLD MANUAL-RTO: 3 %
PLATELET # BLD AUTO: 187 10(3)UL (ref 150–450)
PLATELET MORPHOLOGY: NORMAL
PROTHROMBIN TIME: 16.6 SECONDS (ref 11.6–14.8)
RBC # BLD AUTO: 3.11 X10(6)UL
TOTAL CELLS COUNTED BLD: 100
WBC # BLD AUTO: 3.6 X10(3) UL (ref 4–11)

## 2023-06-21 PROCEDURE — 99232 SBSQ HOSP IP/OBS MODERATE 35: CPT | Performed by: INTERNAL MEDICINE

## 2023-06-21 NOTE — PLAN OF CARE
Vitals stable, no complaints of pain or shortness of breath. Turn q2h, skin check WNL. SCD's placed. Clarified AC with cards after speaking to the son. Per notes and son sounds like AC was being held for possible EGD, but GI signed off yesterday and said no EGD. Per GI can restart AC, per HEME they state cards has to be the one to say because it's for afib. Cards states NO AC right now due to brain mets. Told to page them if family comes to have a discussion with them, but no other family showed up this afternoon so far. Son Carmen De was here to meet with PMR MD, understands that patient will need to dc to saw and that we are waiting on their choice. States he needs to go back to work and sister would come and help make decision. Still no family present during the rest of the day shift. Son had list of 4 WARREN\"S since yesterday, but states he didn't make a choice yet. HGB stable. Patient tired, arousable, appropriate when arousable, but appears depressed with his prognosis. Call light within reach, scopolamine patch in place, secretions sound like they've severely showed down since. Problem: SAFETY ADULT - FALL  Goal: Free from fall injury  Description: INTERVENTIONS:  - Assess pt frequently for physical needs  - Identify cognitive and physical deficits and behaviors that affect risk of falls.   - [place] fall precautions as indicated by assessment.  - Educate pt/family on patient safety including physical limitations  - Instruct pt to call for assistance with activity based on assessment  - Modify environment to reduce risk of injury  - Provide assistive devices as appropriate  - Consider OT/PT consult to assist with strengthening/mobility  - Encourage toileting schedule  Outcome: Progressing     Problem: CARDIOVASCULAR - ADULT  Goal: Maintains optimal cardiac output and hemodynamic stability  Description: INTERVENTIONS:  - Monitor vital signs, rhythm, and trends  - Monitor for bleeding, hypotension and signs of decreased cardiac output  - Evaluate effectiveness of vasoactive medications to optimize hemodynamic stability  - Monitor arterial and/or venous puncture sites for bleeding and/or hematoma  - Assess quality of pulses, skin color and temperature  - Assess for signs of decreased coronary artery perfusion - ex.  Angina  - Evaluate fluid balance, assess for edema, trend weights  Outcome: Progressing  Goal: Absence of cardiac arrhythmias or at baseline  Description: INTERVENTIONS:  - Continuous cardiac monitoring, monitor vital signs, obtain 12 lead EKG if indicated  - Evaluate effectiveness of antiarrhythmic and heart rate control medications as ordered  - Initiate emergency measures for life threatening arrhythmias  - Monitor electrolytes and administer replacement therapy as ordered  Outcome: Progressing

## 2023-06-21 NOTE — CM/SW NOTE
Contacted pt's son, Elvi Sandra to discuss DC planning. Pt's son aware that PMR made the recommendation for W[facility]EN at DC. He also reports receiving a list of available facilities for WARREN placement. He has questions re: PMR evaluation and reports that he is supposed to meet w/ PMR MD today at ~ 1pm. ANTHONY rep, Mita aware and reports Dr. Mahad Mars was asked to see pt's son. Will notify her when pt's son is at bedside. Addendum:  18 Pt's son called to confirm meeting w/ AR MD. He is running late and will be here at 2pm. ANTHONY rep informed. 36 Notified by Jesse herrera that pt's son met w/ Dr. Mahad Mars, rec remains [facility]. [id number] to son, Lenny Bingham. He has questions re: rehab program at [facility] and  Home	[place]. He attempted to call but did not get answers. Reps for both messaged via [employer]. Also asked if [facility] provides WARREN. Confirmed they do not provide WARREN at their facility. CM/SW will follow up tomorrow to facilitate WARREN choice/placement.       CHANNING Yun, VIA [facility]    [id number]

## 2023-06-21 NOTE — PLAN OF CARE
Called by RN to discuss need for anticoagulation in the setting of atrial fibrillation. Discussed with Dr. Sari Denny. Patient is in atrial fibrillation, however previous possible thrombus on [place] was thought to be likely artifact on CT as it was non-gated study and not protocolized for evaluating cardiac structures. Patient with recent GIB and hematemesis and no plans for EGD evaluation at this time. Patient with leptomeningeal metastasis  and MRI findings of small sub arachnoid hemorrhage . Notes per hematology state that he has a high bleed risk and anticoagulation is not recommended. Per Dr. Roselyn Burnett there is a risk of intracranial bleeding if family wishes to resume, and would defer to GI and heme/onc. Will discuss with daughter when she arrives later this afternoon.    4:21 PM

## 2023-06-21 NOTE — PLAN OF CARE
Patient alert and oriented with family members at the bedside. Patient noted to have thick frothy sputum with non productive cough. Suction with [place]auer at the beside. Scopolamine patch placed on behind patient right ear. Family and patient stated that it have been helping with patient secretions. Wctm and notify service of any acute changes. Problem: CARDIOVASCULAR - ADULT  Goal: Maintains optimal cardiac output and hemodynamic stability  Description: INTERVENTIONS:  - Monitor vital signs, rhythm, and trends  - Monitor for bleeding, hypotension and signs of decreased cardiac output  - Evaluate effectiveness of vasoactive medications to optimize hemodynamic stability  - Monitor arterial and/or venous puncture sites for bleeding and/or hematoma  - Assess quality of pulses, skin color and temperature  - Assess for signs of decreased coronary artery perfusion - ex.  Angina  - Evaluate fluid balance, assess for edema, trend weights  Outcome: Progressing  Goal: Absence of cardiac arrhythmias or at baseline  Description: INTERVENTIONS:  - Continuous cardiac monitoring, monitor vital signs, obtain 12 lead EKG if indicated  - Evaluate effectiveness of antiarrhythmic and heart rate control medications as ordered  - Initiate emergency measures for life threatening arrhythmias  - Monitor electrolytes and administer replacement therapy as ordered  Outcome: Progressing

## 2023-06-22 LAB
ANION GAP SERPL CALC-SCNC: 5 MMOL/L (ref 0–18)
BUN BLD-MCNC: 10 MG/DL (ref 7–18)
CALCIUM BLD-MCNC: 8.2 MG/DL (ref 8.5–10.1)
CHLORIDE SERPL-SCNC: 98 MMOL/L (ref 98–112)
CO2 SERPL-SCNC: 23 MMOL/L (ref 21–32)
CREAT BLD-MCNC: 0.34 MG/DL
ERYTHROCYTE [DISTWIDTH] IN BLOOD BY AUTOMATED COUNT: 15.5 %
GFR SERPLBLD BASED ON 1.73 SQ M-ARVRAT: 127 ML/MIN/1.73M2 (ref 60–?)
GLUCOSE BLD-MCNC: 118 MG/DL (ref 70–99)
GLUCOSE BLD-MCNC: 132 MG/DL (ref 70–99)
GLUCOSE BLD-MCNC: 133 MG/DL (ref 70–99)
GLUCOSE BLD-MCNC: 164 MG/DL (ref 70–99)
GLUCOSE BLD-MCNC: 192 MG/DL (ref 70–99)
HCT VFR BLD AUTO: 22.2 %
HGB BLD-MCNC: 7 G/DL
INR BLD: 1.42 (ref 0.85–1.16)
MCH RBC QN AUTO: 25.9 PG (ref 26–34)
MCHC RBC AUTO-ENTMCNC: 31.5 G/DL (ref 31–37)
MCV RBC AUTO: 82.2 FL
OSMOLALITY SERPL CALC.SUM OF ELEC: 262 MOSM/KG (ref 275–295)
PLATELET # BLD AUTO: 173 10(3)UL (ref 150–450)
POTASSIUM SERPL-SCNC: 3.8 MMOL/L (ref 3.5–5.1)
PROTHROMBIN TIME: 17.3 SECONDS (ref 11.6–14.8)
RBC # BLD AUTO: 2.7 X10(6)UL
SODIUM SERPL-SCNC: 126 MMOL/L (ref 136–145)
WBC # BLD AUTO: 3.7 X10(3) UL (ref 4–11)

## 2023-06-22 PROCEDURE — 99232 SBSQ HOSP IP/OBS MODERATE 35: CPT | Performed by: INTERNAL MEDICINE

## 2023-06-22 NOTE — PLAN OF CARE
Received patient at 0730. Alert and Oriented x3. Tele Rhythm A fib. Pt on RA. Breath sounds diminished. Bed is locked and in low position. Call light and personal items within reach. No C/O chest pain or shortness of breath. Pt voiding with primofit in place. Q 2 turns. Scopalamine patch behind R ear. Pain medication given to control pain. Encouraging po intake. Reviewed plan of care and patient verbalizes understanding. Plan:  [facility]EN placement  Control pain    Problem: SAFETY ADULT - FALL  Goal: Free from fall injury  Description: INTERVENTIONS:  - Assess pt frequently for physical needs  - Identify cognitive and physical deficits and behaviors that affect risk of falls. - [place] fall precautions as indicated by assessment.  - Educate pt/family on patient safety including physical limitations  - Instruct pt to call for assistance with activity based on assessment  - Modify environment to reduce risk of injury  - Provide assistive devices as appropriate  - Consider OT/PT consult to assist with strengthening/mobility  - Encourage toileting schedule  Outcome: Progressing     Problem: CARDIOVASCULAR - ADULT  Goal: Maintains optimal cardiac output and hemodynamic stability  Description: INTERVENTIONS:  - Monitor vital signs, rhythm, and trends  - Monitor for bleeding, hypotension and signs of decreased cardiac output  - Evaluate effectiveness of vasoactive medications to optimize hemodynamic stability  - Monitor arterial and/or venous puncture sites for bleeding and/or hematoma  - Assess quality of pulses, skin color and temperature  - Assess for signs of decreased coronary artery perfusion - ex.  Angina  - Evaluate fluid balance, assess for edema, trend weights  Outcome: Progressing  Goal: Absence of cardiac arrhythmias or at baseline  Description: INTERVENTIONS:  - Continuous cardiac monitoring, monitor vital signs, obtain 12 lead EKG if indicated  - Evaluate effectiveness of antiarrhythmic and heart rate control medications as ordered  - Initiate emergency measures for life threatening arrhythmias  - Monitor electrolytes and administer replacement therapy as ordered  Outcome: Progressing

## 2023-06-22 NOTE — CM/SW NOTE
Spoke with patient's son Riccardo Jara) to discuss discharge planning. He is going to tour the [facility] today and is awaiting someone to contact him from [facility]. SW reached out to [facility] to contact son. Expressed need for choice in order to arrange discharge. SW/CM to remain available for support and/or discharge planning. Addendum 2:17pm: Spoke with Jessica James again to check status of WARREN choice. He requested call back in about 10 mins. 2:42pm: Per Jose Alejandro's request, SW called him back, someone picked up and then hung. Attempted again and phone went directly to voice mail. Left detailed message emphasizing need for WARREN choice in order to arrange discharge and anticipating patient to be medically cleared as soon as today. Explained that [address] [place] [address] have a private room available for patient today. Both facilities have informed SW they have also reached out/left voice mails for Jessicachristian Ramirez to provide information and provide tour today. SW left contact information and requested call back. 2:58pm: Spoke with [facility] and placed ambulance on will call for 6/22 and 6/23. PCS form started--*Will need to add date and WARREN choice/address. Updated RN. Await call back from son.    3:30pm: Spoke with Jessica Hubbardhussein who states his sister toured [facility] rehab and so far their first choice is [facility] (who has not responded and was not on the WARREN list). Informed him SW will contact [facility] to see if they are able to accept. They are interested in [facility] as second choice. They prefer a private room. Spoke with Jabari Vines from [facility] to see if they are able to accept patient. She will call SW back with response. 4:15pm: Left message with Jabari Vines from [facility] to check status.    4:22pm: Spoke with Jabari Vines who states she is waiting on the nursing team to respond with decision.         Maricarmen Patiño [facility]  Discharge Planner  [phone]

## 2023-06-22 NOTE — PLAN OF CARE
Received patient in bed with spouse at the bedside. Family update on POC. Pain meds administered for generalized pain. Patient turn Q2 to prevent skin breakdown. VSS. No signs of cardiac or respiratory distress. Scopolamine patch noted on behind right ear. Decrease cough and sputum noted. Needs met. Hourly rounding performed. Wctm and notify service of any acute changes. Problem: CARDIOVASCULAR - ADULT  Goal: Maintains optimal cardiac output and hemodynamic stability  Description: INTERVENTIONS:  - Monitor vital signs, rhythm, and trends  - Monitor for bleeding, hypotension and signs of decreased cardiac output  - Evaluate effectiveness of vasoactive medications to optimize hemodynamic stability  - Monitor arterial and/or venous puncture sites for bleeding and/or hematoma  - Assess quality of pulses, skin color and temperature  - Assess for signs of decreased coronary artery perfusion - ex.  Angina  - Evaluate fluid balance, assess for edema, trend weights  Outcome: Progressing  Goal: Absence of cardiac arrhythmias or at baseline  Description: INTERVENTIONS:  - Continuous cardiac monitoring, monitor vital signs, obtain 12 lead EKG if indicated  - Evaluate effectiveness of antiarrhythmic and heart rate control medications as ordered  - Initiate emergency measures for life threatening arrhythmias  - Monitor electrolytes and administer replacement therapy as ordered  Outcome: Progressing

## 2023-06-23 ENCOUNTER — APPOINTMENT (OUTPATIENT)
Dept: GENERAL RADIOLOGY | Facility: HOSPITAL | Age: 68
End: 2023-06-23
Attending: INTERNAL MEDICINE
Payer: MEDICARE

## 2023-06-23 LAB
ANTIBODY SCREEN: NEGATIVE
BILIRUB UR QL STRIP.AUTO: NEGATIVE
ERYTHROCYTE [DISTWIDTH] IN BLOOD BY AUTOMATED COUNT: 15.6 %
GLUCOSE BLD-MCNC: 129 MG/DL (ref 70–99)
GLUCOSE BLD-MCNC: 132 MG/DL (ref 70–99)
GLUCOSE BLD-MCNC: 132 MG/DL (ref 70–99)
GLUCOSE BLD-MCNC: 164 MG/DL (ref 70–99)
GLUCOSE UR STRIP.AUTO-MCNC: NEGATIVE MG/DL
HCT VFR BLD AUTO: 21 %
HGB BLD-MCNC: 6.5 G/DL
HGB BLD-MCNC: 7.5 G/DL
INR BLD: 1.35 (ref 0.85–1.16)
KETONES UR STRIP.AUTO-MCNC: NEGATIVE MG/DL
LACTATE SERPL-SCNC: 1 MMOL/L (ref 0.4–2)
LEUKOCYTE ESTERASE UR QL STRIP.AUTO: NEGATIVE
MCH RBC QN AUTO: 25.7 PG (ref 26–34)
MCHC RBC AUTO-ENTMCNC: 31 G/DL (ref 31–37)
MCV RBC AUTO: 83 FL
NITRITE UR QL STRIP.AUTO: NEGATIVE
PH UR STRIP.AUTO: 6 [PH] (ref 5–8)
PLATELET # BLD AUTO: 155 10(3)UL (ref 150–450)
PROT UR STRIP.AUTO-MCNC: 30 MG/DL
PROTHROMBIN TIME: 16.6 SECONDS (ref 11.6–14.8)
RBC # BLD AUTO: 2.53 X10(6)UL
RBC UR QL AUTO: NEGATIVE
RH BLOOD TYPE: POSITIVE
SP GR UR STRIP.AUTO: 1.02 (ref 1–1.03)
UROBILINOGEN UR STRIP.AUTO-MCNC: 4 MG/DL
WBC # BLD AUTO: 3.9 X10(3) UL (ref 4–11)

## 2023-06-23 PROCEDURE — 71045 X-RAY EXAM CHEST 1 VIEW: CPT | Performed by: INTERNAL MEDICINE

## 2023-06-23 PROCEDURE — 99233 SBSQ HOSP IP/OBS HIGH 50: CPT | Performed by: INTERNAL MEDICINE

## 2023-06-23 PROCEDURE — 99497 ADVNCD CARE PLAN 30 MIN: CPT | Performed by: INTERNAL MEDICINE

## 2023-06-23 PROCEDURE — 30233N1 TRANSFUSION OF NONAUTOLOGOUS RED BLOOD CELLS INTO PERIPHERAL VEIN, PERCUTANEOUS APPROACH: ICD-10-PCS | Performed by: INTERNAL MEDICINE

## 2023-06-23 RX ORDER — SODIUM CHLORIDE 9 MG/ML
INJECTION, SOLUTION INTRAVENOUS ONCE
Status: DISCONTINUED | OUTPATIENT
Start: 2023-06-23 | End: 2023-06-26

## 2023-06-23 NOTE — PLAN OF CARE
Patient received in bed sleeping, but arouses to verbal stimuli. Alert and oriented x 3, Bengali speaking with brother and son at bedside. Pt had a temp of 100.5 F, prn Tylenol administered with good result. On RA. Afib on tele. Pt on scheduled pain medication. Denies pain, nausea, shortness of breath or chest pain/discomfort. Last bowel on 6/19, pt was offered and given Senokot. POC : WARREN placement. Fall precautions in place. Call light within reach. Problem: SAFETY ADULT - FALL  Goal: Free from fall injury  Description: INTERVENTIONS:  - Assess pt frequently for physical needs  - Identify cognitive and physical deficits and behaviors that affect risk of falls. - [place] fall precautions as indicated by assessment.  - Educate pt/family on patient safety including physical limitations  - Instruct pt to call for assistance with activity based on assessment  - Modify environment to reduce risk of injury  - Provide assistive devices as appropriate  - Consider OT/PT consult to assist with strengthening/mobility  - Encourage toileting schedule  Outcome: Progressing     Problem: CARDIOVASCULAR - ADULT  Goal: Maintains optimal cardiac output and hemodynamic stability  Description: INTERVENTIONS:  - Monitor vital signs, rhythm, and trends  - Monitor for bleeding, hypotension and signs of decreased cardiac output  - Evaluate effectiveness of vasoactive medications to optimize hemodynamic stability  - Monitor arterial and/or venous puncture sites for bleeding and/or hematoma  - Assess quality of pulses, skin color and temperature  - Assess for signs of decreased coronary artery perfusion - ex.  Angina  - Evaluate fluid balance, assess for edema, trend weights  Outcome: Progressing  Goal: Absence of cardiac arrhythmias or at baseline  Description: INTERVENTIONS:  - Continuous cardiac monitoring, monitor vital signs, obtain 12 lead EKG if indicated  - Evaluate effectiveness of antiarrhythmic and heart rate control medications as ordered  - Initiate emergency measures for life threatening arrhythmias  - Monitor electrolytes and administer replacement therapy as ordered  Outcome: Progressing

## 2023-06-23 NOTE — CM/SW NOTE
Pt's son called this am to confirm choice for [facility]. [facility] are able to accept pt but do not have a private room available (as pt/ family prefer). [facility] are available as well, which from notes appears to be second choice. Unable to reach [place], VM left. Will follow up as pt indicated as possible DC today. 1530: Pt's son, [place] called to follow up on [facility] choice. He still prefers [facility] and reports MD indicated no DC at this time. If pt ready for DC before [facility] has a private room available, Pt's son chooses [facility] w/ private room. However, he requests we verify with [facility] that a private room has not become available before pt is sent to [facility]. CM/SW will remain available for DC planning and/or support.      Magdiel Hernandez, TINN, VIA [facility]    [id number]

## 2023-06-23 NOTE — OCCUPATIONAL THERAPY NOTE
Chart reviewed to see pt for skilled OT services this date. Hgb levels at 6.5. RN made aware of attempt. Will follow-up later today as schedule permits.

## 2023-06-23 NOTE — PROGRESS NOTES
Attempted to contact patients sonJessica for consent for patient to receive blood. VM left on phone. Awaiting call back. 1030: Patients son called back- Verbal telephone consent received for blood products. Dual verified by 2 RNs.

## 2023-06-24 LAB
ANION GAP SERPL CALC-SCNC: 8 MMOL/L (ref 0–18)
BLOOD TYPE BARCODE: 5100
BUN BLD-MCNC: 10 MG/DL (ref 7–18)
CALCIUM BLD-MCNC: 7.9 MG/DL (ref 8.5–10.1)
CHLORIDE SERPL-SCNC: 99 MMOL/L (ref 98–112)
CO2 SERPL-SCNC: 24 MMOL/L (ref 21–32)
CREAT BLD-MCNC: 0.36 MG/DL
ERYTHROCYTE [DISTWIDTH] IN BLOOD BY AUTOMATED COUNT: 15.5 %
GFR SERPLBLD BASED ON 1.73 SQ M-ARVRAT: 125 ML/MIN/1.73M2 (ref 60–?)
GLUCOSE BLD-MCNC: 131 MG/DL (ref 70–99)
GLUCOSE BLD-MCNC: 136 MG/DL (ref 70–99)
GLUCOSE BLD-MCNC: 150 MG/DL (ref 70–99)
GLUCOSE BLD-MCNC: 156 MG/DL (ref 70–99)
GLUCOSE BLD-MCNC: 175 MG/DL (ref 70–99)
HCT VFR BLD AUTO: 23.7 %
HGB BLD-MCNC: 7.4 G/DL
INR BLD: 1.27 (ref 0.85–1.16)
MCH RBC QN AUTO: 25.9 PG (ref 26–34)
MCHC RBC AUTO-ENTMCNC: 31.2 G/DL (ref 31–37)
MCV RBC AUTO: 82.9 FL
OSMOLALITY SERPL CALC.SUM OF ELEC: 273 MOSM/KG (ref 275–295)
PLATELET # BLD AUTO: 154 10(3)UL (ref 150–450)
POTASSIUM SERPL-SCNC: 4 MMOL/L (ref 3.5–5.1)
PROCALCITONIN SERPL-MCNC: 0.07 NG/ML (ref ?–0.16)
PROTHROMBIN TIME: 15.8 SECONDS (ref 11.6–14.8)
RBC # BLD AUTO: 2.86 X10(6)UL
SODIUM SERPL-SCNC: 131 MMOL/L (ref 136–145)
UNIT VOLUME: 350 ML
WBC # BLD AUTO: 3.7 X10(3) UL (ref 4–11)

## 2023-06-24 PROCEDURE — 99232 SBSQ HOSP IP/OBS MODERATE 35: CPT | Performed by: INTERNAL MEDICINE

## 2023-06-24 NOTE — PLAN OF CARE
Assumed care of pt at [address]. Primarily Occitan speaking but can speak and understand [place]. Pt A&Ox 2-3; forgetful, extremely poor appetite. On RA; Yankauer suction at bedside with thick white pink tinged secretions. A-fib on tele. Pt incontinent of B&B; external cath in place with loose stools. Pt denies chest pain but c.o discomfort; scheduled pain medicine given as ordered, seems to be resting comfortably at this time. Pt is max total assist.    Plan of care reviewed with pt and family at the bedside; all questions answered at this time. Bed in lowest position; call light within reach. High fall risk precautions are in place per unit protocol. Problem: SAFETY ADULT - FALL  Goal: Free from fall injury  Description: INTERVENTIONS:  - Assess pt frequently for physical needs  - Identify cognitive and physical deficits and behaviors that affect risk of falls. - [place] fall precautions as indicated by assessment.  - Educate pt/family on patient safety including physical limitations  - Instruct pt to call for assistance with activity based on assessment  - Modify environment to reduce risk of injury  - Provide assistive devices as appropriate  - Consider OT/PT consult to assist with strengthening/mobility  - Encourage toileting schedule  Outcome: Progressing     Problem: CARDIOVASCULAR - ADULT  Goal: Maintains optimal cardiac output and hemodynamic stability  Description: INTERVENTIONS:  - Monitor vital signs, rhythm, and trends  - Monitor for bleeding, hypotension and signs of decreased cardiac output  - Evaluate effectiveness of vasoactive medications to optimize hemodynamic stability  - Monitor arterial and/or venous puncture sites for bleeding and/or hematoma  - Assess quality of pulses, skin color and temperature  - Assess for signs of decreased coronary artery perfusion - ex.  Angina  - Evaluate fluid balance, assess for edema, trend weights  Outcome: Progressing  Goal: Absence of cardiac arrhythmias or at baseline  Description: INTERVENTIONS:  - Continuous cardiac monitoring, monitor vital signs, obtain 12 lead EKG if indicated  - Evaluate effectiveness of antiarrhythmic and heart rate control medications as ordered  - Initiate emergency measures for life threatening arrhythmias  - Monitor electrolytes and administer replacement therapy as ordered  Outcome: Progressing

## 2023-06-24 NOTE — PLAN OF CARE
Received pt at 0730. AxOx2. Room air. Denies pain/SOB. Vitals stable. Afib w controlled rates on tele. Strong productive cough, Yankauer at bedside. Poor appetite. Q2 turns. See flowsheets for additional assessments. Plan to continue IV abx, awaiting [facility]ARREN selection. Plan of care updated with pt, questions answered, verbalized understanding. Safety precautions in place. Instructed to call staff for help. Will continue to monitor. Problem: SAFETY ADULT - FALL  Goal: Free from fall injury  Description: INTERVENTIONS:  - Assess pt frequently for physical needs  - Identify cognitive and physical deficits and behaviors that affect risk of falls. - [place] fall precautions as indicated by assessment.  - Educate pt/family on patient safety including physical limitations  - Instruct pt to call for assistance with activity based on assessment  - Modify environment to reduce risk of injury  - Provide assistive devices as appropriate  - Consider OT/PT consult to assist with strengthening/mobility  - Encourage toileting schedule  Outcome: Progressing     Problem: CARDIOVASCULAR - ADULT  Goal: Maintains optimal cardiac output and hemodynamic stability  Description: INTERVENTIONS:  - Monitor vital signs, rhythm, and trends  - Monitor for bleeding, hypotension and signs of decreased cardiac output  - Evaluate effectiveness of vasoactive medications to optimize hemodynamic stability  - Monitor arterial and/or venous puncture sites for bleeding and/or hematoma  - Assess quality of pulses, skin color and temperature  - Assess for signs of decreased coronary artery perfusion - ex.  Angina  - Evaluate fluid balance, assess for edema, trend weights  Outcome: Progressing  Goal: Absence of cardiac arrhythmias or at baseline  Description: INTERVENTIONS:  - Continuous cardiac monitoring, monitor vital signs, obtain 12 lead EKG if indicated  - Evaluate effectiveness of antiarrhythmic and heart rate control medications as ordered  - Initiate emergency measures for life threatening arrhythmias  - Monitor electrolytes and administer replacement therapy as ordered  Outcome: Progressing

## 2023-06-25 LAB
ANION GAP SERPL CALC-SCNC: 10 MMOL/L (ref 0–18)
BUN BLD-MCNC: 8 MG/DL (ref 7–18)
CALCIUM BLD-MCNC: 8.4 MG/DL (ref 8.5–10.1)
CHLORIDE SERPL-SCNC: 100 MMOL/L (ref 98–112)
CO2 SERPL-SCNC: 22 MMOL/L (ref 21–32)
CREAT BLD-MCNC: 0.38 MG/DL
ERYTHROCYTE [DISTWIDTH] IN BLOOD BY AUTOMATED COUNT: 15.2 %
GFR SERPLBLD BASED ON 1.73 SQ M-ARVRAT: 123 ML/MIN/1.73M2 (ref 60–?)
GLUCOSE BLD-MCNC: 116 MG/DL (ref 70–99)
GLUCOSE BLD-MCNC: 128 MG/DL (ref 70–99)
GLUCOSE BLD-MCNC: 136 MG/DL (ref 70–99)
GLUCOSE BLD-MCNC: 143 MG/DL (ref 70–99)
GLUCOSE BLD-MCNC: 170 MG/DL (ref 70–99)
HCT VFR BLD AUTO: 24 %
HGB BLD-MCNC: 7.5 G/DL
INR BLD: 1.34 (ref 0.85–1.16)
MCH RBC QN AUTO: 26.4 PG (ref 26–34)
MCHC RBC AUTO-ENTMCNC: 31.3 G/DL (ref 31–37)
MCV RBC AUTO: 84.5 FL
OSMOLALITY SERPL CALC.SUM OF ELEC: 273 MOSM/KG (ref 275–295)
PLATELET # BLD AUTO: 163 10(3)UL (ref 150–450)
POTASSIUM SERPL-SCNC: 4 MMOL/L (ref 3.5–5.1)
PROTHROMBIN TIME: 16.5 SECONDS (ref 11.6–14.8)
RBC # BLD AUTO: 2.84 X10(6)UL
SODIUM SERPL-SCNC: 132 MMOL/L (ref 136–145)
WBC # BLD AUTO: 3.1 X10(3) UL (ref 4–11)

## 2023-06-25 PROCEDURE — 99232 SBSQ HOSP IP/OBS MODERATE 35: CPT | Performed by: INTERNAL MEDICINE

## 2023-06-25 RX ORDER — AMOXICILLIN AND CLAVULANATE POTASSIUM 875; 125 MG/1; MG/1
1 TABLET, FILM COATED ORAL 2 TIMES DAILY
Qty: 6 TABLET | Refills: 0 | Status: SHIPPED | OUTPATIENT
Start: 2023-06-25 | End: 2023-06-28

## 2023-06-25 RX ORDER — PANTOPRAZOLE SODIUM 40 MG/1
40 TABLET, DELAYED RELEASE ORAL
Qty: 60 TABLET | Refills: 0 | Status: SHIPPED | OUTPATIENT
Start: 2023-06-25

## 2023-06-25 RX ORDER — PANTOPRAZOLE SODIUM 40 MG/1
40 TABLET, DELAYED RELEASE ORAL
Status: DISCONTINUED | OUTPATIENT
Start: 2023-06-25 | End: 2023-06-26

## 2023-06-25 NOTE — PLAN OF CARE
Patient is alert & oriented x4. Pt resting in bed, rolls side-to-side. Breath sounds are diminished bilateral. On room air. Productive cough w/ clear mucous. Afib rhythm, HR controlled. Generalized pain reported. Skin dry and intact. Primofit overnight. Bed in low position and call light within reach. Reviewed plan of care and patient verbalizes understanding. Problem: SAFETY ADULT - FALL  Goal: Free from fall injury  Description: INTERVENTIONS:  - Assess pt frequently for physical needs  - Identify cognitive and physical deficits and behaviors that affect risk of falls. - [place] fall precautions as indicated by assessment.  - Educate pt/family on patient safety including physical limitations  - Instruct pt to call for assistance with activity based on assessment  - Modify environment to reduce risk of injury  - Provide assistive devices as appropriate  - Consider OT/PT consult to assist with strengthening/mobility  - Encourage toileting schedule  Outcome: Progressing     Problem: CARDIOVASCULAR - ADULT  Goal: Maintains optimal cardiac output and hemodynamic stability  Description: INTERVENTIONS:  - Monitor vital signs, rhythm, and trends  - Monitor for bleeding, hypotension and signs of decreased cardiac output  - Evaluate effectiveness of vasoactive medications to optimize hemodynamic stability  - Monitor arterial and/or venous puncture sites for bleeding and/or hematoma  - Assess quality of pulses, skin color and temperature  - Assess for signs of decreased coronary artery perfusion - ex.  Angina  - Evaluate fluid balance, assess for edema, trend weights  Outcome: Progressing  Goal: Absence of cardiac arrhythmias or at baseline  Description: INTERVENTIONS:  - Continuous cardiac monitoring, monitor vital signs, obtain 12 lead EKG if indicated  - Evaluate effectiveness of antiarrhythmic and heart rate control medications as ordered  - Initiate emergency measures for life threatening arrhythmias  - Monitor electrolytes and administer replacement therapy as ordered  Outcome: Progressing

## 2023-06-25 NOTE — PLAN OF CARE
Received pt at 0730. AxOx2-3. Room air. Denies pain/SOB. Vitals stable. Afib w controlled rates on tele. Continued productive cough, suction at bedside. Poor appetite but tolerating Ensure. Q2 turns. See flowsheets for additional assessments. Continue IV abx. Discharge to [facility], awaiting family selection. Family currently at bedside. Plan of care updated with pt and family, questions answered, verbalized understanding. Safety precautions in place. Instructed to call staff for help. Will continue to monitor. Problem: SAFETY ADULT - FALL  Goal: Free from fall injury  Description: INTERVENTIONS:  - Assess pt frequently for physical needs  - Identify cognitive and physical deficits and behaviors that affect risk of falls. - [place] fall precautions as indicated by assessment.  - Educate pt/family on patient safety including physical limitations  - Instruct pt to call for assistance with activity based on assessment  - Modify environment to reduce risk of injury  - Provide assistive devices as appropriate  - Consider OT/PT consult to assist with strengthening/mobility  - Encourage toileting schedule  Outcome: Progressing     Problem: CARDIOVASCULAR - ADULT  Goal: Maintains optimal cardiac output and hemodynamic stability  Description: INTERVENTIONS:  - Monitor vital signs, rhythm, and trends  - Monitor for bleeding, hypotension and signs of decreased cardiac output  - Evaluate effectiveness of vasoactive medications to optimize hemodynamic stability  - Monitor arterial and/or venous puncture sites for bleeding and/or hematoma  - Assess quality of pulses, skin color and temperature  - Assess for signs of decreased coronary artery perfusion - ex.  Angina  - Evaluate fluid balance, assess for edema, trend weights  Outcome: Progressing  Goal: Absence of cardiac arrhythmias or at baseline  Description: INTERVENTIONS:  - Continuous cardiac monitoring, monitor vital signs, obtain 12 lead EKG if indicated  - Evaluate effectiveness of antiarrhythmic and heart rate control medications as ordered  - Initiate emergency measures for life threatening arrhythmias  - Monitor electrolytes and administer replacement therapy as ordered  Outcome: Progressing

## 2023-06-25 NOTE — CM/SW NOTE
Discharge order entered and plan reviewed. Per CM note, son's preferred choice is a private room at [facility]. If no private room, second choice is [facility]. Sent message to [facility] via [employer] to inquire if private room available today. Addendum [id number] [facility] nobody available in admissions, message left for [facility]  Left message for Greta Oliver, from [address] [place] [address] and Jayshree from [facility] to determine if private room available today. Sent message to all accepting providers in [facility] to determine if bed available. / to remain available for support and/or discharge planning.      Aliyah VELASQUEZA MSN, RN CTL/  [id number]

## 2023-06-26 VITALS
SYSTOLIC BLOOD PRESSURE: 123 MMHG | HEART RATE: 85 BPM | BODY MASS INDEX: 32 KG/M2 | OXYGEN SATURATION: 100 % | DIASTOLIC BLOOD PRESSURE: 67 MMHG | WEIGHT: 224.31 LBS | RESPIRATION RATE: 18 BRPM | TEMPERATURE: 98 F

## 2023-06-26 LAB
GLUCOSE BLD-MCNC: 163 MG/DL (ref 70–99)
GLUCOSE BLD-MCNC: 207 MG/DL (ref 70–99)
HCT VFR BLD AUTO: 25.2 %
HGB BLD-MCNC: 7.7 G/DL
INR BLD: 1.28 (ref 0.85–1.16)
PROTHROMBIN TIME: 15.9 SECONDS (ref 11.6–14.8)

## 2023-06-26 PROCEDURE — 99239 HOSP IP/OBS DSCHRG MGMT >30: CPT | Performed by: INTERNAL MEDICINE

## 2023-06-26 RX ORDER — OXYCODONE HYDROCHLORIDE 15 MG/1
15 TABLET ORAL EVERY 6 HOURS PRN
Qty: 30 TABLET | Refills: 0 | Status: SHIPPED | OUTPATIENT
Start: 2023-06-26

## 2023-06-26 RX ORDER — MORPHINE SULFATE 15 MG/1
30 TABLET, FILM COATED, EXTENDED RELEASE ORAL EVERY 12 HOURS SCHEDULED
Qty: 60 TABLET | Refills: 0 | Status: SHIPPED | OUTPATIENT
Start: 2023-06-26

## 2023-06-26 NOTE — PLAN OF CARE
Pt discharged to [facility] [place]. Report called to Jovany at [facility]. IV removed. Tele dc'd and returned to monitor tech. Follow up instructions provided and sent with patient. Pt verbalized understanding. Rx scripts given. Pt wheeled down by [facility] with all belongings. Pt left denying complaints of pain, malaise, or cardiac symptoms. All needs met by staff.

## 2023-06-26 NOTE — PLAN OF CARE
Assumed care at 0730. Pt alert, oriented x4. Oxygen saturation adequate on room air. Lung sounds diminished bilaterally. Tele: atrial fibrillation, rates controlled. Incontinent, primofit in place. Scheduled pain medications for left hip pain. Plan of care: await [facility] placement, IV antibiotics, transition to PO antibiotics upon discharge, PT/OT. Patient updated on plan of care. Problem: SAFETY ADULT - FALL  Goal: Free from fall injury  Description: INTERVENTIONS:  - Assess pt frequently for physical needs  - Identify cognitive and physical deficits and behaviors that affect risk of falls. - [place] fall precautions as indicated by assessment.  - Educate pt/family on patient safety including physical limitations  - Instruct pt to call for assistance with activity based on assessment  - Modify environment to reduce risk of injury  - Provide assistive devices as appropriate  - Consider OT/PT consult to assist with strengthening/mobility  - Encourage toileting schedule  Outcome: Progressing     Problem: CARDIOVASCULAR - ADULT  Goal: Maintains optimal cardiac output and hemodynamic stability  Description: INTERVENTIONS:  - Monitor vital signs, rhythm, and trends  - Monitor for bleeding, hypotension and signs of decreased cardiac output  - Evaluate effectiveness of vasoactive medications to optimize hemodynamic stability  - Monitor arterial and/or venous puncture sites for bleeding and/or hematoma  - Assess quality of pulses, skin color and temperature  - Assess for signs of decreased coronary artery perfusion - ex.  Angina  - Evaluate fluid balance, assess for edema, trend weights  Outcome: Progressing  Goal: Absence of cardiac arrhythmias or at baseline  Description: INTERVENTIONS:  - Continuous cardiac monitoring, monitor vital signs, obtain 12 lead EKG if indicated  - Evaluate effectiveness of antiarrhythmic and heart rate control medications as ordered  - Initiate emergency measures for life threatening arrhythmias  - Monitor electrolytes and administer replacement therapy as ordered  Outcome: Progressing

## 2023-06-26 NOTE — PHYSICAL THERAPY NOTE
PHYSICAL THERAPY TREATMENT NOTE - INPATIENT    Room Number: 7825/2543-O     Session: 3     Number of Visits to Meet Established Goals: 7    Presenting Problem: nausea and vomiting  Co-Morbidities : afib previously on warfarin now discontinued 2/2 GIB in 04/2023, DMII, prostate cancer with mets s/p chemo and radiation     History related to current admission: Patient is a 74year old male admitted on 6/11/2023 from home for nausea, vomiting, abdominal pain. Recent admissions:  4/11-4/13/23 for Hematemsis in the setting of supratherapeutic INR --> DC'd home  3/1-3/10/23 for neoplasm related pain --> DC'd [facility]    ASSESSMENT     Pt able to tolerate static stance this session ,  however fatigues quickly and presents with impaired strength and decreased endurance below PLOF. Pt will continue to benefit from ongoing skilled therapy. DISCHARGE RECOMMENDATIONS  PT Discharge Recommendations: Sub-acute rehabilitation     PLAN  PT Treatment Plan: Bed mobility; Body mechanics; Endurance; Energy conservation;Patient education; Family education;Gait training;Neuromuscular re-educate;Range of motion;Strengthening;Stoop training;Stair training;Transfer training;Balance training  Rehab Potential : Fair  Frequency (Obs): 3-5x/week    CURRENT GOALS   Goal #1 Patient is able to demonstrate supine - sit EOB @ level: supervision      Goal #2 Patient is able to demonstrate transfers Sit to/from Stand at assistance level: supervision      Goal #3 Patient is able to ambulate 25 feet with assist device: walker - rolling at assistance level: supervision      Goal #4     Goal #5     Goal #6     Goal Comments: Goals established on 6/12/2023 6/26/2023 all goals ongoing. SUBJECTIVE  Pt states he has pain in L hip and also points to his abdomen     OBJECTIVE  Precautions: Bed/chair alarm;  needed (Jorge)    WEIGHT BEARING RESTRICTION  Weight Bearing Restriction: None                PAIN ASSESSMENT   Rating: Unable to rate  Location: L hip, abdomen  Management Techniques: Activity promotion; Body mechanics;Breathing techniques;Relaxation;Repositioning    BALANCE                                                                                                                       Static Sitting: Fair -  Dynamic Sitting: Poor +           Static Standing: Poor  Dynamic Standing: Not tested    ACTIVITY TOLERANCE                         O2 WALK         AM-PAC '6-Clicks' INPATIENT SHORT FORM - BASIC MOBILITY  How much difficulty does the patient currently have. .. Patient Difficulty: Turning over in bed (including adjusting bedclothes, sheets and blankets)?: A Little   Patient Difficulty: Sitting down on and standing up from a chair with arms (e.g., wheelchair, bedside commode, etc.): A Lot   Patient Difficulty: Moving from lying on back to sitting on the side of the bed?: A Lot   How much help from another person does the patient currently need. .. Help from Another: Moving to and from a bed to a chair (including a wheelchair)?: Total   Help from Another: Need to walk in hospital room?: Total   Help from Another: Climbing 3-5 steps with a railing?: Total       AM-PAC Score:  Raw Score: 10   Approx Degree of Impairment: 76.75%   Standardized Score (AM-PAC Scale): 32.29   CMS Modifier (G-Code): CL    FUNCTIONAL ABILITY STATUS  Gait Assessment   Functional Mobility/Gait Assessment  Gait Assistance: Not tested  Distance (ft): 0    Skilled Therapy Provided  Pt presents in semi sup. Max A to don socks Mod x2 to t/f EOB,   Pt tolerates EOB sitting c supervision and fair - seated balance, performed seated therex BUE BLE, pt requires mod A x 2 sit<>stand and tolerates prolonged standing, however, unable to take side steps. After seated rest, pt attempted sit<>stand and was unable to achieve c max x 2. Pt able to scoot laterally c increased time, and minimal progress. Pt reports dizziness max x 2 to return to supine.   /84 Encouraged pt to perform increased therex in bed, pt lying to side, stating discomfort to lay on back to do therex. Bed Mobility:  Rolling: Min  A R/L   Supine<>Sit: Mod A x 2  Sit<>Supine: Max A x 2     Transfer Mobility:  Sit<>Stand: mod x 2  Stand<>Sit:  Gait: NT    Therapist's Comments: encouraged AROM/AAROM therex    THERAPEUTIC EXERCISES  Lower Extremity Ankle pumps  Heel slides  Knee extension  LAQ     Upper Extremity Elbow flex/ext, Shoulder flex/ext and Wrist flex/ext     Position Sitting     Repetitions   10   Sets   1     Patient End of Session: In bed;Needs met;Call light within reach;RN aware of session/findings; All patient questions and concerns addressed;SCDs in place; Alarm set    PT Session Time: 25 minutes  Gait Trainin minutes  Therapeutic Activity: 10 minutes  Therapeutic Exercise: 15 minutes   Neuromuscular Re-education:  minutes

## 2023-06-26 NOTE — CM/SW NOTE
06/26/23 1152   Discharge disposition   Expected discharge disposition [id number] Provider [facility]   Discharge transportation [facility] PHF with Matias Robison from [place] who was unable to confirm private room available. She explained that he might be able to admit in a private but cannot guarantee patient would remain in a room alone, likely would have a roommate eventually. [facility]. [facility] does not have a private room. [facility] has a private room available today. Spoke with patient's son Carolin Garcia) to provide [facility] bed availability update. He chose [facility] and is agreeable with discharge this afternoon. He requested patient receive head to toe bed bath prior to discharge, updated RN. Spoke with Alivia Bedolla from [facility] who confirmed bed available today. Spoke with [facility] ambulance and scheduled  for 2:30pm today. PCS form completed. SW will remain available.     [address]   Phone: [phone]    [facility]  [phone] or [facility]  Discharge Planner  [phone]

## 2023-06-26 NOTE — PLAN OF CARE
Assumed care for pt at 19:30 on 6/25    A&Ox3, reoriented to situation and POC. Pain managed with PO morphine. VSS on RA. Afib on tele, no AC. Bilateral SCDs on for VTE prophylaxis. Primofit in place for incontinence. Briefed. Turn q 2h. Unasyn infused. Two visitors ~ 1 AM for patient. Visitors said they were let in because pt is in a hospice situation. Sympathized with visitors, stated family had declined hospice. Informed visitors of rule for only 1 overnight visitor, and visiting hours. Plan: Waiting for bed at facility, usasyn, daily weight, I&O, AM labs    Bed alarm on, call light in reach, able to make needs known.

## 2023-06-27 ENCOUNTER — TELEPHONE (OUTPATIENT)
Dept: HEMATOLOGY/ONCOLOGY | Facility: HOSPITAL | Age: 68
End: 2023-06-27

## 2023-06-27 ENCOUNTER — EXTERNAL FACILITY (OUTPATIENT)
Dept: FAMILY MEDICINE CLINIC | Facility: CLINIC | Age: 68
End: 2023-06-27

## 2023-06-27 DIAGNOSIS — C61 PROSTATE CANCER METASTATIC TO BONE (HCC): ICD-10-CM

## 2023-06-27 DIAGNOSIS — C79.49 MENINGEAL CARCINOMATOSIS (HCC): ICD-10-CM

## 2023-06-27 DIAGNOSIS — C80.1 MENINGEAL CARCINOMATOSIS (HCC): ICD-10-CM

## 2023-06-27 DIAGNOSIS — I48.21 PERMANENT ATRIAL FIBRILLATION (HCC): ICD-10-CM

## 2023-06-27 DIAGNOSIS — E11.9 TYPE 2 DIABETES MELLITUS WITHOUT COMPLICATION, WITHOUT LONG-TERM CURRENT USE OF INSULIN (HCC): ICD-10-CM

## 2023-06-27 DIAGNOSIS — J18.9 PNEUMONIA DUE TO INFECTIOUS ORGANISM, UNSPECIFIED LATERALITY, UNSPECIFIED PART OF LUNG: ICD-10-CM

## 2023-06-27 DIAGNOSIS — C79.51 PROSTATE CANCER METASTATIC TO BONE (HCC): ICD-10-CM

## 2023-06-27 DIAGNOSIS — R53.1 WEAKNESS: Primary | ICD-10-CM

## 2023-06-27 DIAGNOSIS — K59.00 CONSTIPATION, UNSPECIFIED CONSTIPATION TYPE: ICD-10-CM

## 2023-06-27 PROCEDURE — 99306 1ST NF CARE HIGH MDM 50: CPT | Performed by: FAMILY MEDICINE

## 2023-06-27 PROCEDURE — G0317 PROLNG NSG FAC E/M EA ADDL 15 MIN: HCPCS | Performed by: FAMILY MEDICINE

## 2023-06-27 PROCEDURE — 1111F DSCHRG MED/CURRENT MED MERGE: CPT | Performed by: FAMILY MEDICINE

## 2023-06-29 ENCOUNTER — INITIAL APN SNF VISIT (OUTPATIENT)
Dept: INTERNAL MEDICINE CLINIC | Age: 68
End: 2023-06-29

## 2023-06-29 VITALS
HEART RATE: 101 BPM | SYSTOLIC BLOOD PRESSURE: 120 MMHG | TEMPERATURE: 99 F | OXYGEN SATURATION: 95 % | WEIGHT: 226 LBS | RESPIRATION RATE: 20 BRPM | DIASTOLIC BLOOD PRESSURE: 92 MMHG | BODY MASS INDEX: 32 KG/M2

## 2023-06-29 DIAGNOSIS — E11.9 TYPE 2 DIABETES MELLITUS WITHOUT COMPLICATION, WITHOUT LONG-TERM CURRENT USE OF INSULIN (HCC): ICD-10-CM

## 2023-06-29 DIAGNOSIS — I48.21 PERMANENT ATRIAL FIBRILLATION (HCC): ICD-10-CM

## 2023-06-29 DIAGNOSIS — C61 PROSTATE CARCINOMA (HCC): Primary | ICD-10-CM

## 2023-06-29 DIAGNOSIS — J18.9 PNEUMONIA DUE TO INFECTIOUS ORGANISM, UNSPECIFIED LATERALITY, UNSPECIFIED PART OF LUNG: ICD-10-CM

## 2023-06-29 DIAGNOSIS — R53.1 WEAKNESS: ICD-10-CM

## 2023-06-29 DIAGNOSIS — Z87.19 H/O: GI BLEED: ICD-10-CM

## 2023-06-29 DIAGNOSIS — R11.2 NAUSEA AND VOMITING, UNSPECIFIED VOMITING TYPE: ICD-10-CM

## 2023-06-29 DIAGNOSIS — R10.30 LOWER ABDOMINAL PAIN: ICD-10-CM

## 2023-06-29 DIAGNOSIS — E87.1 HYPONATREMIA: ICD-10-CM

## 2023-06-29 DIAGNOSIS — K59.03 DRUG-INDUCED CONSTIPATION: ICD-10-CM

## 2023-06-29 PROCEDURE — 1111F DSCHRG MED/CURRENT MED MERGE: CPT | Performed by: NURSE PRACTITIONER

## 2023-06-29 PROCEDURE — 1123F ACP DISCUSS/DSCN MKR DOCD: CPT | Performed by: NURSE PRACTITIONER

## 2023-06-29 PROCEDURE — 99310 SBSQ NF CARE HIGH MDM 45: CPT | Performed by: NURSE PRACTITIONER

## 2023-06-29 RX ORDER — POLYETHYLENE GLYCOL 3350 17 G/17G
17 POWDER, FOR SOLUTION ORAL 2 TIMES DAILY
COMMUNITY

## 2023-06-29 RX ORDER — SENNA AND DOCUSATE SODIUM 50; 8.6 MG/1; MG/1
1 TABLET, FILM COATED ORAL 2 TIMES DAILY
COMMUNITY

## 2023-06-29 RX ORDER — BISACODYL 10 MG
10 SUPPOSITORY, RECTAL RECTAL DAILY PRN
COMMUNITY

## 2023-06-30 ENCOUNTER — EXTERNAL FACILITY (OUTPATIENT)
Dept: FAMILY MEDICINE CLINIC | Facility: CLINIC | Age: 68
End: 2023-06-30

## 2023-06-30 DIAGNOSIS — R53.1 WEAKNESS: ICD-10-CM

## 2023-06-30 DIAGNOSIS — K59.00 CONSTIPATION, UNSPECIFIED CONSTIPATION TYPE: ICD-10-CM

## 2023-06-30 DIAGNOSIS — C79.51 PROSTATE CANCER METASTATIC TO BONE (HCC): ICD-10-CM

## 2023-06-30 DIAGNOSIS — R11.2 INTRACTABLE NAUSEA AND VOMITING: Primary | ICD-10-CM

## 2023-06-30 DIAGNOSIS — C61 PROSTATE CANCER METASTATIC TO BONE (HCC): ICD-10-CM

## 2023-06-30 PROCEDURE — 99309 SBSQ NF CARE MODERATE MDM 30: CPT | Performed by: FAMILY MEDICINE

## 2023-06-30 PROCEDURE — 1111F DSCHRG MED/CURRENT MED MERGE: CPT | Performed by: FAMILY MEDICINE

## 2023-07-06 ENCOUNTER — SNF VISIT (OUTPATIENT)
Dept: INTERNAL MEDICINE CLINIC | Age: 68
End: 2023-07-06

## 2023-07-06 VITALS
HEART RATE: 78 BPM | WEIGHT: 226 LBS | DIASTOLIC BLOOD PRESSURE: 65 MMHG | OXYGEN SATURATION: 95 % | RESPIRATION RATE: 20 BRPM | SYSTOLIC BLOOD PRESSURE: 127 MMHG | TEMPERATURE: 98 F | BODY MASS INDEX: 32 KG/M2

## 2023-07-06 DIAGNOSIS — C61 PROSTATE CARCINOMA (HCC): Primary | ICD-10-CM

## 2023-07-06 DIAGNOSIS — R11.2 NAUSEA AND VOMITING, UNSPECIFIED VOMITING TYPE: ICD-10-CM

## 2023-07-06 DIAGNOSIS — E11.9 TYPE 2 DIABETES MELLITUS WITHOUT COMPLICATION, WITHOUT LONG-TERM CURRENT USE OF INSULIN (HCC): ICD-10-CM

## 2023-07-06 DIAGNOSIS — R63.0 POOR APPETITE: ICD-10-CM

## 2023-07-06 DIAGNOSIS — I48.21 PERMANENT ATRIAL FIBRILLATION (HCC): ICD-10-CM

## 2023-07-06 DIAGNOSIS — K59.03 DRUG-INDUCED CONSTIPATION: ICD-10-CM

## 2023-07-06 DIAGNOSIS — R10.30 LOWER ABDOMINAL PAIN: ICD-10-CM

## 2023-07-06 DIAGNOSIS — J18.9 PNEUMONIA DUE TO INFECTIOUS ORGANISM, UNSPECIFIED LATERALITY, UNSPECIFIED PART OF LUNG: ICD-10-CM

## 2023-07-06 DIAGNOSIS — R53.1 WEAKNESS: ICD-10-CM

## 2023-07-06 DIAGNOSIS — E87.1 HYPONATREMIA: ICD-10-CM

## 2023-07-06 PROCEDURE — 1111F DSCHRG MED/CURRENT MED MERGE: CPT | Performed by: NURSE PRACTITIONER

## 2023-07-06 PROCEDURE — 99310 SBSQ NF CARE HIGH MDM 45: CPT | Performed by: NURSE PRACTITIONER

## 2023-07-06 RX ORDER — MORPHINE SULFATE 15 MG/1
30 TABLET, FILM COATED, EXTENDED RELEASE ORAL EVERY 12 HOURS SCHEDULED
Qty: 60 TABLET | Refills: 0 | Status: SHIPPED | OUTPATIENT
Start: 2023-07-06

## 2023-07-08 ENCOUNTER — EXTERNAL FACILITY (OUTPATIENT)
Dept: FAMILY MEDICINE CLINIC | Facility: CLINIC | Age: 68
End: 2023-07-08

## 2023-07-08 DIAGNOSIS — R11.2 INTRACTABLE NAUSEA AND VOMITING: Primary | ICD-10-CM

## 2023-07-08 DIAGNOSIS — C79.51 PROSTATE CANCER METASTATIC TO BONE (HCC): ICD-10-CM

## 2023-07-08 DIAGNOSIS — E11.9 TYPE 2 DIABETES MELLITUS WITHOUT COMPLICATION, WITHOUT LONG-TERM CURRENT USE OF INSULIN (HCC): ICD-10-CM

## 2023-07-08 DIAGNOSIS — K59.00 CONSTIPATION, UNSPECIFIED CONSTIPATION TYPE: ICD-10-CM

## 2023-07-08 DIAGNOSIS — C61 PROSTATE CANCER METASTATIC TO BONE (HCC): ICD-10-CM

## 2023-07-08 PROCEDURE — 1111F DSCHRG MED/CURRENT MED MERGE: CPT | Performed by: FAMILY MEDICINE

## 2023-07-08 PROCEDURE — 99309 SBSQ NF CARE MODERATE MDM 30: CPT | Performed by: FAMILY MEDICINE

## 2023-07-09 ENCOUNTER — APPOINTMENT (OUTPATIENT)
Dept: GENERAL RADIOLOGY | Facility: HOSPITAL | Age: 68
End: 2023-07-09
Attending: EMERGENCY MEDICINE
Payer: MEDICARE

## 2023-07-09 ENCOUNTER — HOSPITAL ENCOUNTER (INPATIENT)
Facility: HOSPITAL | Age: 68
LOS: 1 days | Discharge: NURSING, REHAB OR LONG-TERM CARE | End: 2023-07-10
Attending: EMERGENCY MEDICINE | Admitting: INTERNAL MEDICINE
Payer: MEDICARE

## 2023-07-09 DIAGNOSIS — E87.1 HYPONATREMIA: Primary | ICD-10-CM

## 2023-07-09 DIAGNOSIS — R06.00 DYSPNEA, UNSPECIFIED TYPE: ICD-10-CM

## 2023-07-09 DIAGNOSIS — I48.91 ATRIAL FIBRILLATION WITH RAPID VENTRICULAR RESPONSE (HCC): ICD-10-CM

## 2023-07-09 DIAGNOSIS — J81.0 ACUTE PULMONARY EDEMA (HCC): ICD-10-CM

## 2023-07-09 PROBLEM — R73.9 HYPERGLYCEMIA: Status: ACTIVE | Noted: 2023-07-09

## 2023-07-09 PROBLEM — D69.6 THROMBOCYTOPENIA (HCC): Status: ACTIVE | Noted: 2023-01-01

## 2023-07-09 PROBLEM — D69.6 THROMBOCYTOPENIA (HCC): Status: ACTIVE | Noted: 2023-07-09

## 2023-07-09 PROBLEM — R73.9 HYPERGLYCEMIA: Status: ACTIVE | Noted: 2023-01-01

## 2023-07-09 LAB
ALBUMIN SERPL-MCNC: 2.3 G/DL (ref 3.4–5)
ALBUMIN/GLOB SERPL: 0.5 {RATIO} (ref 1–2)
ALP LIVER SERPL-CCNC: 188 U/L
ALT SERPL-CCNC: 16 U/L
ANION GAP SERPL CALC-SCNC: 9 MMOL/L (ref 0–18)
ANTIBODY SCREEN: NEGATIVE
ARTERIAL PATENCY WRIST A: POSITIVE
AST SERPL-CCNC: 38 U/L (ref 15–37)
BASE EXCESS BLDA CALC-SCNC: 2.4 MMOL/L (ref ?–2)
BASOPHILS # BLD: 0 X10(3) UL (ref 0–0.2)
BASOPHILS NFR BLD: 0 %
BILIRUB SERPL-MCNC: 0.8 MG/DL (ref 0.1–2)
BODY TEMPERATURE: 98.6 F
BUN BLD-MCNC: 8 MG/DL (ref 7–18)
CA-I BLD-SCNC: 1.19 MMOL/L (ref 0.95–1.32)
CALCIUM BLD-MCNC: 8.6 MG/DL (ref 8.5–10.1)
CHLORIDE SERPL-SCNC: 97 MMOL/L (ref 98–112)
CO2 SERPL-SCNC: 22 MMOL/L (ref 21–32)
COHGB MFR BLD: 2.5 % SAT (ref 0–3)
CREAT BLD-MCNC: 0.48 MG/DL
EOSINOPHIL # BLD: 0.05 X10(3) UL (ref 0–0.7)
EOSINOPHIL NFR BLD: 1 %
ERYTHROCYTE [DISTWIDTH] IN BLOOD BY AUTOMATED COUNT: 16.1 %
GFR SERPLBLD BASED ON 1.73 SQ M-ARVRAT: 115 ML/MIN/1.73M2 (ref 60–?)
GLOBULIN PLAS-MCNC: 4.2 G/DL (ref 2.8–4.4)
GLUCOSE BLD-MCNC: 126 MG/DL (ref 70–99)
HCO3 BLDA-SCNC: 26.8 MEQ/L (ref 21–27)
HCT VFR BLD AUTO: 24 %
HGB BLD-MCNC: 7.2 G/DL
HGB BLD-MCNC: 7.4 G/DL
LACTATE BLD-SCNC: 1.2 MMOL/L (ref 0.5–2)
LACTATE SERPL-SCNC: 1.7 MMOL/L (ref 0.4–2)
LYMPHOCYTES NFR BLD: 0.33 X10(3) UL (ref 1–4)
LYMPHOCYTES NFR BLD: 7 %
MAGNESIUM SERPL-MCNC: 1.7 MG/DL (ref 1.6–2.6)
MCH RBC QN AUTO: 25.4 PG (ref 26–34)
MCHC RBC AUTO-ENTMCNC: 30.8 G/DL (ref 31–37)
MCV RBC AUTO: 82.5 FL
METAMYELOCYTES # BLD: 0.09 X10(3) UL
METAMYELOCYTES NFR BLD: 2 %
METHGB MFR BLD: 0.7 % SAT (ref 0.4–1.5)
MONOCYTES # BLD: 0.19 X10(3) UL (ref 0.1–1)
MONOCYTES NFR BLD: 4 %
MYELOCYTES # BLD: 0.09 X10(3) UL
MYELOCYTES NFR BLD: 2 %
NEUTROPHILS # BLD AUTO: 3.47 X10 (3) UL (ref 1.5–7.7)
NEUTROPHILS NFR BLD: 82 %
NEUTS BAND NFR BLD: 2 %
NEUTS HYPERSEG # BLD: 3.95 X10(3) UL (ref 1.5–7.7)
NT-PROBNP SERPL-MCNC: 2811 PG/ML (ref ?–125)
OSMOLALITY SERPL CALC.SUM OF ELEC: 266 MOSM/KG (ref 275–295)
OXYHGB MFR BLDA: 96.5 % (ref 92–100)
P/F RATIO: 290 MMHG
PCO2 BLDA: 31 MM HG (ref 35–45)
PH BLDA: 7.52 [PH] (ref 7.35–7.45)
PLATELET # BLD AUTO: 139 10(3)UL (ref 150–450)
PLATELET MORPHOLOGY: NORMAL
PO2 BLDA: 90 MM HG (ref 80–100)
POTASSIUM BLD-SCNC: 4.2 MMOL/L (ref 3.6–5.1)
POTASSIUM SERPL-SCNC: 4.1 MMOL/L (ref 3.5–5.1)
PROCALCITONIN SERPL-MCNC: 0.08 NG/ML (ref ?–0.16)
PROT SERPL-MCNC: 6.5 G/DL (ref 6.4–8.2)
RBC # BLD AUTO: 2.91 X10(6)UL
RH BLOOD TYPE: POSITIVE
SARS-COV-2 RNA RESP QL NAA+PROBE: NOT DETECTED
SODIUM BLD-SCNC: 126 MMOL/L (ref 135–145)
SODIUM SERPL-SCNC: 128 MMOL/L (ref 136–145)
TOTAL CELLS COUNTED BLD: 100
TROPONIN I HIGH SENSITIVITY: 7 NG/L
WBC # BLD AUTO: 4.7 X10(3) UL (ref 4–11)

## 2023-07-09 PROCEDURE — 99497 ADVNCD CARE PLAN 30 MIN: CPT | Performed by: INTERNAL MEDICINE

## 2023-07-09 PROCEDURE — 99223 1ST HOSP IP/OBS HIGH 75: CPT | Performed by: INTERNAL MEDICINE

## 2023-07-09 PROCEDURE — 71045 X-RAY EXAM CHEST 1 VIEW: CPT | Performed by: EMERGENCY MEDICINE

## 2023-07-09 PROCEDURE — 30233N1 TRANSFUSION OF NONAUTOLOGOUS RED BLOOD CELLS INTO PERIPHERAL VEIN, PERCUTANEOUS APPROACH: ICD-10-PCS | Performed by: EMERGENCY MEDICINE

## 2023-07-09 RX ORDER — BENZONATATE 100 MG/1
200 CAPSULE ORAL 3 TIMES DAILY PRN
Status: DISCONTINUED | OUTPATIENT
Start: 2023-07-09 | End: 2023-07-10

## 2023-07-09 RX ORDER — METOCLOPRAMIDE HYDROCHLORIDE 5 MG/ML
10 INJECTION INTRAMUSCULAR; INTRAVENOUS EVERY 8 HOURS PRN
Status: DISCONTINUED | OUTPATIENT
Start: 2023-07-09 | End: 2023-07-10

## 2023-07-09 RX ORDER — ECHINACEA PURPUREA EXTRACT 125 MG
1 TABLET ORAL
Status: DISCONTINUED | OUTPATIENT
Start: 2023-07-09 | End: 2023-07-10

## 2023-07-09 RX ORDER — DILTIAZEM HYDROCHLORIDE 5 MG/ML
10 INJECTION INTRAVENOUS ONCE
Status: COMPLETED | OUTPATIENT
Start: 2023-07-09 | End: 2023-07-09

## 2023-07-09 RX ORDER — FUROSEMIDE 10 MG/ML
20 INJECTION INTRAMUSCULAR; INTRAVENOUS ONCE
Status: COMPLETED | OUTPATIENT
Start: 2023-07-09 | End: 2023-07-10

## 2023-07-09 RX ORDER — NICOTINE POLACRILEX 4 MG
15 LOZENGE BUCCAL
Status: DISCONTINUED | OUTPATIENT
Start: 2023-07-09 | End: 2023-07-10

## 2023-07-09 RX ORDER — ONDANSETRON 2 MG/ML
4 INJECTION INTRAMUSCULAR; INTRAVENOUS EVERY 6 HOURS PRN
Status: DISCONTINUED | OUTPATIENT
Start: 2023-07-09 | End: 2023-07-10

## 2023-07-09 RX ORDER — NICOTINE POLACRILEX 4 MG
30 LOZENGE BUCCAL
Status: DISCONTINUED | OUTPATIENT
Start: 2023-07-09 | End: 2023-07-10

## 2023-07-09 RX ORDER — POLYETHYLENE GLYCOL 3350 17 G/17G
17 POWDER, FOR SOLUTION ORAL DAILY PRN
Status: DISCONTINUED | OUTPATIENT
Start: 2023-07-09 | End: 2023-07-10

## 2023-07-09 RX ORDER — TAMSULOSIN HYDROCHLORIDE 0.4 MG/1
0.4 CAPSULE ORAL
Status: DISCONTINUED | OUTPATIENT
Start: 2023-07-10 | End: 2023-07-10

## 2023-07-09 RX ORDER — GABAPENTIN 250 MG/5ML
250 SOLUTION ORAL 2 TIMES DAILY
Status: DISCONTINUED | OUTPATIENT
Start: 2023-07-10 | End: 2023-07-10

## 2023-07-09 RX ORDER — DEXTROSE MONOHYDRATE 25 G/50ML
50 INJECTION, SOLUTION INTRAVENOUS
Status: DISCONTINUED | OUTPATIENT
Start: 2023-07-09 | End: 2023-07-10

## 2023-07-09 RX ORDER — MELATONIN
3 NIGHTLY PRN
Status: DISCONTINUED | OUTPATIENT
Start: 2023-07-09 | End: 2023-07-10

## 2023-07-09 RX ORDER — FINASTERIDE 5 MG/1
5 TABLET, FILM COATED ORAL DAILY
Status: DISCONTINUED | OUTPATIENT
Start: 2023-07-10 | End: 2023-07-10

## 2023-07-09 RX ORDER — PANTOPRAZOLE SODIUM 40 MG/1
40 TABLET, DELAYED RELEASE ORAL
Status: DISCONTINUED | OUTPATIENT
Start: 2023-07-10 | End: 2023-07-10

## 2023-07-09 RX ORDER — SENNOSIDES 8.6 MG
17.2 TABLET ORAL NIGHTLY PRN
Status: DISCONTINUED | OUTPATIENT
Start: 2023-07-09 | End: 2023-07-10

## 2023-07-09 RX ORDER — BISACODYL 10 MG
10 SUPPOSITORY, RECTAL RECTAL
Status: DISCONTINUED | OUTPATIENT
Start: 2023-07-09 | End: 2023-07-10

## 2023-07-09 RX ORDER — ACETAMINOPHEN 500 MG
500 TABLET ORAL EVERY 4 HOURS PRN
Status: DISCONTINUED | OUTPATIENT
Start: 2023-07-09 | End: 2023-07-10

## 2023-07-09 RX ORDER — DEXAMETHASONE 4 MG/1
4 TABLET ORAL
Status: DISCONTINUED | OUTPATIENT
Start: 2023-07-10 | End: 2023-07-10

## 2023-07-09 RX ORDER — METOPROLOL SUCCINATE 25 MG/1
25 TABLET, EXTENDED RELEASE ORAL NIGHTLY
Status: DISCONTINUED | OUTPATIENT
Start: 2023-07-10 | End: 2023-07-10

## 2023-07-09 RX ORDER — ENEMA 19; 7 G/133ML; G/133ML
1 ENEMA RECTAL ONCE AS NEEDED
Status: DISCONTINUED | OUTPATIENT
Start: 2023-07-09 | End: 2023-07-10

## 2023-07-09 NOTE — PROGRESS NOTES
Sandor Gilman Author: Sharon Beavers MD     1955 MRN [id number]   [facility]  Admission 23      Last Hospital Discharge 23 PCP Dmitry Chunivett of Discharge  [facility]        CC -follow-up    H. P.I Sandor Gilman is a 74year old male with past medical history significant for atrial fibrillation type 2 diabetes mellitus advanced prostate cancer s/p chemo and radiation presented to the emergency room with abdominal pain nausea vomiting. Back in April patient was admitted with hematemesis to the hospital and his Coumadin was discontinued secondary to high risk of bleeding  He was started on radiation for the prostate cancer and ever since has been having intermittent vomiting and nausea  On the day of admission in the emergency room a CT scan of the chest showed pneumonia as well as metastasis from the prostate and potential mural thrombus in the left appendage. Patient was started on heparin drip. He was also found to be in atrial fibrillation with RVR. Cardiology was consulted, patient underwent echo that was negative. His anticoagulation was discontinued and the plan is to avoid long-term anticoagulation due to risk of bleeding as well as known brain metastasis, MRI revealed meningeal metastasis. Patient is no longer a candidate for therapy for his metastatic prostate cancer  Palliative care was consulted and had numerous discussions with the patient and the family  Patient and family is not ready for hospice.   He wants to be full code  During his admission cardiology, gastroenterology, oncology as well as palliative care were consulted  Patient was transferred to [facility] on 2023  Discharge diagnosis  Discharge Diagnosis:  Sepsis 2/2 pneumonia  Leptomeningeal disease  Metastatic prostate cancer  Afib with RVR   Left atrial appendage thrombus, ruled out  Normocytic anemia  Hyponatremia  Dysphagia  History of GIB  DMII  GERD    2023 patient seen in his room today, along with his wife, daughter and son  Complains of abdominal discomfort  Patient has been vomiting almost daily  Patient is on oxycodone for the pain in his bones as well as morphine possibly a side effect of narcotics  Denies any cough chest pain or shortness of breath  He continues to be constipated despite being on stool softener     7/8/2023  Patient is doing well  Still complains of left lower abdominal pain  Having bowel movements daily  Mild coughing      Past Medical History:   Diagnosis Date    Arrhythmia     Diabetes ([facility] [address]     Mitral stenosis 2011    Prostate cancer ([facility] [address] 07/2021     No past surgical history on file. No family history on file. Social History     Socioeconomic History    Marital status:    Tobacco Use    Smoking status: Never    Smokeless tobacco: Never   Vaping Use    Vaping Use: Never used   Substance and Sexual Activity    Alcohol use: Never    Drug use: Never       ALLERGIES:  No Known Allergies    CODE STATUS:  Full Code    CURRENT MEDICATIONS   Current Outpatient Medications   Medication Sig Dispense Refill    morphINE ER 15 MG Oral Tab CR Take 2 tablets (30 mg total) by mouth every 12 (twelve) hours. 60 tablet 0    senna-docusate 8.6-50 MG Oral Tab Take 1 tablet by mouth in the morning and 1 tablet before bedtime. polyethylene glycol, PEG 3350, 17 g Oral Powd Pack Take 17 g by mouth in the morning and 17 g before bedtime. bisacodyl (DULCOLAX) 10 MG Rectal Suppos Place 1 suppository (10 mg total) rectally daily as needed (constipation). oxyCODONE 15 MG Oral Tab Take 1 tablet (15 mg total) by mouth every 6 (six) hours as needed for Pain. 30 tablet 0    pantoprazole 40 MG Oral Tab EC Take 1 tablet (40 mg total) by mouth 2 (two) times daily before meals. 60 tablet 0    famotidine 20 MG Oral Tab Take 1 tablet (20 mg total) by mouth 2 (two) times daily.  (Patient not taking: Reported on 6/29/2023)      Multiple Vitamins-Minerals (MULTI-VITAMIN/MINERALS) Oral Tab Take 1 tablet by mouth daily. Bacillus Coagulans-Inulin (ALIGN PREBIOTIC-PROBIOTIC OR) Take 1 capsule by mouth in the morning and 1 capsule before bedtime. ondansetron 4 MG Oral Tablet Dispersible Take 1 tablet (4 mg total) by mouth every 8 (eight) hours as needed for Nausea. 30 tablet 0    dexamethasone (DECADRON) 4 MG tablet Take 1 tablet (4 mg total) by mouth daily with breakfast. 40 tablet 0    gabapentin 250 MG/5ML Oral Solution Take 2 mL (100 mg total) by mouth daily. metoprolol succinate ER 25 MG Oral Tablet 24 Hr Take 1 tablet (25 mg total) by mouth Daily Beta Blocker. (Patient taking differently: Take 1 tablet (25 mg total) by mouth at bedtime.) 90 tablet 2    finasteride 5 MG Oral Tab Take 1 tablet (5 mg total) by mouth daily. tamsulosin (FLOMAX) 0.4 MG Oral Cap Take 1 capsule (0.4 mg total) by mouth daily. metFORMIN HCl 1000 MG Oral Tab Take 1 tablet (1,000 mg total) by mouth 2 (two) times daily with meals. ERLEADA 60 MG Oral Tab tab Take 4 tablets (240 mg total) by mouth daily. (Patient not taking: Reported on 6/29/2023)         REVIEW OF SYSTEMS:  Review of Systems: As per HPI  Constitutional: No fevers, chills, fatigue or night sweats. ENT: No mouth pain, neck pain, running nose, headaches or swollen glands. Skin: No rashes, pruritus or skin changes,  Respiratory: Denies cough, wheezing or shortness of breath. CV: Denies chest pain, palpitations, orthopnea, PND or dizziness. Musculoskeletal: No joint pain, stiffness or swelling.   GI: As per HPI  Neurologic: No seizures, tremors, weakness or numbness    VITALS: Pulse 82/min respiration 18/min temperature 98.7 blood pressure 120/70      PHYSICAL EXAM:  GENERAL: well developed, well nourished, in no apparent distress  SKIN: no rashes, no suspicious lesions  Wound; no open wounds  HEENT: atraumatic, normocephalic,  LUNGS: clear to auscultation  CARDIO: RRR without murmur  GI: good BS's, no masses, left lower quadrant tenderness, no rebound  : deferred  RECTAL: deferred  MUSCULOSKELETAL: back is not tender, FROM of the back  EXTREMITIES: no cyanosis, clubbing or edema  NEURO: Oriented times three, cranial nerves are intact, motor and sensory are grossly intact      DIAGNOSTICS REVIEWED AT THIS VISIT:  Recent labs reviewed  recent labs from 7/6/2023 reviewed  BUN of 11 creatinine 0.5 his sodium is slightly low at 132 alk phos is high at 214 CBC showed hemoglobin of 7.7 hematocrit 25 platelets 623  Chest x-ray showed questionable left retrocardiac pneumonia  Chest x-ray from the hospital 6/23 showed groundglass opacity and consolidation in the left upper lobe suggesting pneumonia  Patient has finished a course of antibiotics    Assessment and plan      Nausea and vomiting  Possibly side effect of narcotics  Continue Zofran  Hydration discussed with the patient    Weakness  -Continue   PT to work on ambulation, gait, balance, strength, endurance, transfers, safety  - OT to work on fine motor skills, ADLs, hygiene, toileting, transfers, safety  Pneumonia due to infectious organism, unspecified laterality, unspecified part of lung  Mild cough otherwise asymptomatic  Prostate cancer metastatic to bone ([facility] [address]  Advanced disease  Morphine and oxycodone for pain control  Not a candidate for therapy  On Erleada  Type 2 diabetes mellitus without complication, without long-term current use of insulin (HCC)  On metformin 1000 mg twice a day  Check sugars before meals and at bedtime  Patient is on dexamethasone, his sugars might be elevated  Log of sugars reviewed  Permanent atrial fibrillation (HCC)  Rate controlled on metoprolol  Meningeal carcinomatosis (HCC)  On dexamethasone  Constipation, unspecified constipation type  Secondary to narcotics  On constipation protocol and MiraLAX  24h nursing for medication administration, bowel/bladder care, pain/sleep assessment  - Physician supervision for multiple medical comorbidities, fall risk, DVT risk, infection risk, pain management    We will check labs    Alyssa Armstrong MD   [address], [facility]    Electronically signed      This dictation was performed with a verbal recognition program [facility]) and it was checked for errors. It is possible that there are still dictated errors within this office note. If so, please bring any errors to my attention for an addendum.  All efforts were made to ensure that this office note is accurate

## 2023-07-09 NOTE — ED INITIAL ASSESSMENT (HPI)
Patient here for RYLEY. Patient being treated for pneumonia and reports to family he is having increased difficulty breathing.

## 2023-07-10 VITALS
WEIGHT: 226 LBS | DIASTOLIC BLOOD PRESSURE: 65 MMHG | BODY MASS INDEX: 31.64 KG/M2 | SYSTOLIC BLOOD PRESSURE: 102 MMHG | HEART RATE: 97 BPM | RESPIRATION RATE: 18 BRPM | OXYGEN SATURATION: 99 % | TEMPERATURE: 98 F | HEIGHT: 71 IN

## 2023-07-10 LAB
ANION GAP SERPL CALC-SCNC: 10 MMOL/L (ref 0–18)
BASOPHILS # BLD: 0 X10(3) UL (ref 0–0.2)
BASOPHILS NFR BLD: 0 %
BUN BLD-MCNC: 9 MG/DL (ref 7–18)
CALCIUM BLD-MCNC: 8.5 MG/DL (ref 8.5–10.1)
CHLORIDE SERPL-SCNC: 98 MMOL/L (ref 98–112)
CO2 SERPL-SCNC: 22 MMOL/L (ref 21–32)
CREAT BLD-MCNC: 0.39 MG/DL
EOSINOPHIL # BLD: 0 X10(3) UL (ref 0–0.7)
EOSINOPHIL NFR BLD: 0 %
ERYTHROCYTE [DISTWIDTH] IN BLOOD BY AUTOMATED COUNT: 15.6 %
GFR SERPLBLD BASED ON 1.73 SQ M-ARVRAT: 122 ML/MIN/1.73M2 (ref 60–?)
GLUCOSE BLD-MCNC: 102 MG/DL (ref 70–99)
GLUCOSE BLD-MCNC: 113 MG/DL (ref 70–99)
GLUCOSE BLD-MCNC: 122 MG/DL (ref 70–99)
GLUCOSE BLD-MCNC: 147 MG/DL (ref 70–99)
HCT VFR BLD AUTO: 27.6 %
HGB BLD-MCNC: 8.6 G/DL
LYMPHOCYTES NFR BLD: 0.5 X10(3) UL (ref 1–4)
LYMPHOCYTES NFR BLD: 12 %
MCH RBC QN AUTO: 26 PG (ref 26–34)
MCHC RBC AUTO-ENTMCNC: 31.2 G/DL (ref 31–37)
MCV RBC AUTO: 83.4 FL
MONOCYTES # BLD: 0.13 X10(3) UL (ref 0.1–1)
MONOCYTES NFR BLD: 3 %
NEUTROPHILS # BLD AUTO: 2.88 X10 (3) UL (ref 1.5–7.7)
NEUTROPHILS NFR BLD: 80 %
NEUTS BAND NFR BLD: 5 %
NEUTS HYPERSEG # BLD: 3.57 X10(3) UL (ref 1.5–7.7)
NRBC BLD MANUAL-RTO: 3 %
OSMOLALITY SERPL CALC.SUM OF ELEC: 269 MOSM/KG (ref 275–295)
PLATELET # BLD AUTO: 106 10(3)UL (ref 150–450)
PLATELET MORPHOLOGY: NORMAL
POTASSIUM SERPL-SCNC: 3.6 MMOL/L (ref 3.5–5.1)
Q-T INTERVAL: 332 MS
QRS DURATION: 92 MS
QTC CALCULATION (BEZET): 457 MS
R AXIS: 25 DEGREES
RBC # BLD AUTO: 3.31 X10(6)UL
SODIUM SERPL-SCNC: 130 MMOL/L (ref 136–145)
T AXIS: 179 DEGREES
TOTAL CELLS COUNTED BLD: 100
VENTRICULAR RATE: 114 BPM
WBC # BLD AUTO: 4.2 X10(3) UL (ref 4–11)

## 2023-07-10 PROCEDURE — 99239 HOSP IP/OBS DSCHRG MGMT >30: CPT | Performed by: INTERNAL MEDICINE

## 2023-07-10 RX ORDER — IPRATROPIUM BROMIDE AND ALBUTEROL SULFATE 2.5; .5 MG/3ML; MG/3ML
3 SOLUTION RESPIRATORY (INHALATION) 3 TIMES DAILY
COMMUNITY
Start: 2023-07-06 | End: 2023-07-16

## 2023-07-10 RX ORDER — MAGNESIUM OXIDE 400 MG/1
400 TABLET ORAL ONCE
Status: COMPLETED | OUTPATIENT
Start: 2023-07-10 | End: 2023-07-10

## 2023-07-10 RX ORDER — DOXYCYCLINE HYCLATE 100 MG/1
100 CAPSULE ORAL 2 TIMES DAILY
COMMUNITY
Start: 2023-07-06 | End: 2023-07-13

## 2023-07-10 NOTE — PROGRESS NOTES
Patient discharged to [facility] via [facility] ambulance. Report given to the nurse at the [place] [address]. AVS and all discharge instructions printed and provided to patient. All questions answered. Patient and family verbalized understanding.

## 2023-07-10 NOTE — CM/SW NOTE
Pt admitted from [facility]. Initial updates sent in [place]. PT ordered. Will confirm return.      [facility] [phone]    LEA Azevedo, [facility]  Discharge [address] [facility].

## 2023-07-10 NOTE — PHYSICAL THERAPY NOTE
PHYSICAL THERAPY EVALUATION - INPATIENT     Room Number: 4670/4285-W  Evaluation Date: 7/10/2023  Type of Evaluation: Initial  Physician Order: PT Eval and Treat    Presenting Problem: RYLEY, weakness, pneumonia  Co-Morbidities : afib, brain metastaes, metastitic prostate cancer w/ leptomeningal diseazse, anemia, chronic hyponatremia, DM2, GERD  Reason for Therapy: Mobility Dysfunction and Discharge Planning    History related to current admission: Patient is a 74year old male admitted on 7/9/2023 from [address] for RYLEY, weakness. Recent Admissions:   6/11-6/26/23- nausea and vomiting --> Dc [facility] --> [place] [place]-   4/11-4/13/23 for Hematemsis in the setting of supratherapeutic INR --> DC'd home  3/1-3/10/23 for neoplasm related pain --> DC'd [facility]    ASSESSMENT   In this PT evaluation, the patient presents with the following impairments decr activity tolerance/endurance, impaired cognition, disoriented to time, incr fatigue/lethargy levels, decr balance, decr functional mobility. These impairments and comorbidities manifest themselves as functional limitations in independent bed mobility, transfers, and gait. The patient is below baseline and would benefit from skilled inpatient PT to address the above deficits to assist patient in returning to prior to level of function. Functional outcome measures completed include AMPAC. The AM-PAC '6-Clicks' Inpatient Basic Mobility Short Form was completed and this patient is demonstrating a Approx Degree of Impairment: 76.75%  degree of impairment in mobility. Research supports that patients with this level of impairment may benefit from [facility]. Pending pt's progress and participation level may consider 24 hour care. DISCHARGE RECOMMENDATIONS  PT Discharge Recommendations: Sub-acute rehabilitation    PLAN  PT Treatment Plan: Body mechanics; Bed mobility; Endurance; Energy conservation;Patient education; Family education;Gait training;Neuromuscular re-educate;Range of motion;Strengthening;Stair training;Stoop training;Transfer training;Balance training  Rehab Potential : Guarded  Frequency (Obs):  (2x/week)  Number of Visits to Meet Established Goals: 3      CURRENT GOALS    Goal #1 Patient is able to demonstrate supine - sit EOB @ level: supervision     Goal #2 Patient is able to demonstrate transfers Sit to/from Stand at assistance level: supervision     Goal #3 Patient is able to ambulate 10 feet with assist device: walker - rolling at assistance level: minimum assistance     Goal #4    Goal #5    Goal #6    Goal Comments: Goals established on 7/10/2023    HOME SITUATION  Type of Home: House   Home Layout: Two level  Stairs to Enter : 6  Railing: Yes  Stairs to Bedroom: 6  Railing: Yes    Lives With: Spouse; Son  Drives: Yes (was prior to last admission)  Patient Owned Equipment: Rolling walker; Hospital bed  Patient Regularly Uses: None    Prior Level of [place]: From last PT eval 23:  Son reports pt lives with him and patient's wife. Pt has been requiring assist for ADLs and mobility. Son reports they have hospital bed. Son assists with bed mobility and ambulation with RW, son follows him with chair, only able to tolerate ambulating short distances. Difficult to maintain information in regards to what patient was doing at rehab center due to language barrier and pt's fatigue level. Stated he was up in the chair doing exercises and not walking. SUBJECTIVE  \"No\"      OBJECTIVE  Precautions: Bed/chair alarm;  needed (Faroese speaking)  Fall Risk: High fall risk    WEIGHT BEARING RESTRICTION  Weight Bearing Restriction: None                PAIN ASSESSMENT  Ratin  Location: denies       COGNITION  Overall Cognitive Status:  Impaired  Orientation Level:  oriented to place, oriented to situation, oriented to person, and disoriented to time  Following Commands:  follows one-step commands inconsistently  Initiation: cues to initiate tasks    RANGE OF MOTION AND STRENGTH ASSESSMENT  Upper extremity ROM and strength are within functional limits : see OT note for specifics     Lower extremity ROM is within functional limits     Lower extremity strength is within functional limits except for the following:   globally 3+/5      BALANCE  Static Sitting: Good  Dynamic Sitting: Fair +  Static Standing: Fair -  Dynamic Standing: Poor +    ADDITIONAL TESTS                                    ACTIVITY TOLERANCE                         O2 WALK       NEUROLOGICAL FINDINGS                        AM-PAC '6-Clicks' INPATIENT SHORT FORM - BASIC MOBILITY  How much difficulty does the patient currently have. .. Patient Difficulty: Turning over in bed (including adjusting bedclothes, sheets and blankets)?: A Lot   Patient Difficulty: Sitting down on and standing up from a chair with arms (e.g., wheelchair, bedside commode, etc.): A Lot   Patient Difficulty: Moving from lying on back to sitting on the side of the bed?: A Lot   How much help from another person does the patient currently need. .. Help from Another: Moving to and from a bed to a chair (including a wheelchair)?: A Lot   Help from Another: Need to walk in hospital room?: Total   Help from Another: Climbing 3-5 steps with a railing?: Total       AM-PAC Score:  Raw Score: 10   Approx Degree of Impairment: 76.75%   Standardized Score (AM-PAC Scale): 32.29   CMS Modifier (G-Code): CL    FUNCTIONAL ABILITY STATUS  Gait Assessment   Functional Mobility/Gait Assessment  Gait Assistance: Not tested    Skilled Therapy Provided     Bed Mobility:  Rolling: maxA   Supine to sit: maxA x 2   Sit to supine: NT     Transfer Mobility:  Sit to stand: modA x 2 to RW   Stand to sit: modA x 2  Gait = NT    Therapist's Comments: Patient presents sitting up in bed. Brother present in room- initially assisting with translation. Brother then left.   used as pt speaks [place].Discussed role and goal of physical therapy while in the hospital setting. Pt reports no pain at this time. Supine with HOB elevated to sitting EOB completed at maxA x 2- pt with no initiation to help therapists. Once sitting EOB, pt able to tolerate about ~10 minutes of sitting EOB with fair+ static sitting balance when Dr. Russ Joaquin came into room. Pt inconsistently follows one step commands and requires incr time and repetition. Sit to stand from elevated bed height completed at modA x 2  to RW. Observed pt with incr difficulty keeping UE on RW, especially the LUE tended to fall off the walker- pt unaware of it. Pt able to take about 5 lateral steps with RW to bedside chair at [address] x 2. Pt with no eccentric control when completing stand to sit- requiring modA x 2. Pt very lethargic throughout session, little initiation of movement by pt. Pt upright in bedside chair at end of session. Due to patient's complex medical history/ prolonged hospital admission in June, current recommendation to [facility]. Recommendation is pending patient's medical status, physical activity progress, and patient participation levels. RN and SW made aware. Exercise/Education Provided:  Bed mobility  Body mechanics  Energy conservation  Functional activity tolerated  Transfer training    Patient End of Session: Up in chair;Needs met;Call light within reach;RN aware of session/findings; All patient questions and concerns addressed; Alarm set; Discussed recommendations with /      Patient Evaluation Complexity Level:  History Moderate - 1 or 2 personal factors and/or co-morbidities   Examination of body systems Moderate - addressing a total of 3 or more elements   Clinical Presentation Moderate - Evolving   Clinical Decision Making Moderate - Evolving       PT Session Time: 35 minutes  Gait Training:  minutes  Therapeutic Activity: 20 minutes  Neuromuscular Re-education:  minutes  Therapeutic Exercise:  minutes

## 2023-07-10 NOTE — ED QUICK NOTES
Orders for admission, patient is aware of plan and ready to go upstairs. Any questions, please call ED RN Elizabeth Shaw at extension [phone]. Patient Covid vaccination status: Fully vaccinated     COVID Test Ordered in ED: Rapid SARS-CoV-2 by PCR    COVID Suspicion at Admission: N/A    Running Infusions:  None    Mental Status/LOC at time of transport: 3/3    Other pertinent information:  Pt having trouble with swallowing  solids.  Pt mostly on liquids or soft foods, ( yougurt, applesauce)   CIWA score: N/A   NIH score:  N/A

## 2023-07-10 NOTE — CM/SW NOTE
07/10/23 1411   Discharge disposition   Expected discharge disposition subacute   Post Acute Care Provider Candel[facility] Na   Discharge transportation [facility]       Pt cleared for discharge to return to [facility] today. BLS set up for 5:00pm. PCS form completed. Mita at [facility] aware of return. Attempting to call son Xavier Briggs to provide update, but phone not ringing. Will continue to try.      RN to call report at discharge: Fani Rueda [facility] 15.: [id number] [facility]  Discharge Planner  [id number]

## 2023-07-10 NOTE — PLAN OF CARE
A&OX2. Denied pain. Normally takes morphine XR 15mg bid but on hold due to lethargy. 1 unit PRBC given. SLP to see. Pt/Ot to see. Premo fit. Bed bound. Total care. Qid.      Problem: Diabetes/Glucose Control  Goal: Glucose maintained within prescribed range  Description: INTERVENTIONS:  - Monitor Blood Glucose as ordered  - Assess for signs and symptoms of hyperglycemia and hypoglycemia  - Administer ordered medications to maintain glucose within target range  - Assess barriers to adequate nutritional intake and initiate nutrition consult as needed  - Instruct patient on self management of diabetes  Outcome: Progressing

## 2023-07-10 NOTE — SLP NOTE
ADULT SWALLOWING EVALUATION    ASSESSMENT    ASSESSMENT/OVERALL IMPRESSION:  Patient seen for swallowing evaluation due to concern for aspiration contributing to respiratory difficulties. Patient presented to ED due to increased difficulty breathing. Patient being treated for pneumonia at SNF. Patient paperwork indicates patient prescribed regular consistency solids and thin liquids at SNF. Documentation from nursing here indicates patient was having trouble swallowing solids and was mostly consuming liquids and soft foods like yogurt and applesauce. RN noted patient tolerated po medication well this morning. Patient received in bed, lying on his left side. SLP raised HOB and patient declined to rotate to lay on his back. Patient voice is soft and he exhibits audible upper airway congestion prior to any po. SLP attempted to utilize  services but she was largely unable to hear the patient aside from answering \"no\" when asked if he has trouble chewing solid food. Oral mechanism exam remarkable for edentulous status but no other focal findings. SLP presented thin liquids by straw, solid and puree trials. Patient adamantly declined solid trials and only agreeable to a single puree trial. Patient with intact oral retrieval and containment. Oral prep and transit were prolonged but adequate and with complete oral clearance. Laryngeal excursion judged to be of adequate strength and timeliness to palpation. There were no overt signs of aspiration noted. Limited evaluation due to reduced po acceptance. Given above, recommend puree diet and thin liquids providing assistance and encouragement for po. Note planned discharge back to SNF later today. Recommend patient to be followed by SLP to assess for resumption of regular consistency solids if clinically appropriate based on baseline diet prescription from SNF of regular consistency solids and thin liquids. Discussed above with RN. RECOMMENDATIONS   Diet Recommendations - Solids: Puree  Diet Recommendations - Liquids: Thin Liquids                           Aspiration Precautions: Upright position; Slow rate;Small bites and sips (assist with feeding as needed)  Medication Administration Recommendations: One pill at a time; Whole in puree  Treatment Plan/Recommendations:  (SLP at SNF to follow for diet advancement)  Discharge Recommendations/Plan: [facility]  Reason for Referral: R/O aspiration    Problem List  Principal Problem:    Hyponatremia  Active Problems:    Atrial fibrillation with rapid ventricular response (HCC)    Thrombocytopenia (HCC)    Hyperglycemia    Dyspnea, unspecified type    Acute pulmonary edema [facility])      Past Medical History  Past Medical History:   Diagnosis Date    Arrhythmia     Diabetes ([facility] [address]     Mitral stenosis 2011    Prostate cancer ([facility] [address] 07/2021       Prior Living Situation: Skilled nursing facility  Diet Prior to Admission: Regular; Thin liquids (no concentrated sweets)  Precautions: Aspiration    Patient/Family Goals: none stated     SWALLOWING HISTORY  Current Diet Consistency: Regular; Thin liquids  Dysphagia History: as above  Imaging Results:   CXR from 7/9/23 revealed:  CONCLUSION:  Cardiomegaly with pulmonary vascular redistribution. LOCATION:  [facility]                 Dictated by (CST): Leanne Lozada MD on 7/09/2023 at 7:59 PM      Finalized by (CST): Leanne Lozada MD on 7/09/2023 at 8:01 PM      WBC from 7/10 - 4.2  proBNP from 7/9 - 2,811  Procalcitonin from 7/9 - 0.08  SUBJECTIVE       OBJECTIVE   ORAL MOTOR EXAMINATION  Dentition: Edentulous  Symmetry: Within Functional Limits  Strength: Within Functional Limits  Tone: Within Functional Limits  Range of Motion: Within Functional Limits  Rate of Motion: Reduced    Voice Quality: Weak  Respiratory Status: Unlabored  Consistencies Trialed:  Thin liquids;Puree (patient refused solid trial)  Method of Presentation: Self presentation;Staff/Clinician assistance;Straw;Consecutive swallows  Patient Positioning: Upright;Midline (turned to left, declined repositioning)    Oral Phase of Swallow: Within Functional Limits                      Pharyngeal Phase of Swallow: Within Functional Limits           (Please note: Silent aspiration cannot be evaluated clinically.  Videofluoroscopic Swallow Study is required to rule-out silent aspiration.)    Esophageal Phase of Swallow: No complaints consistent with possible esophageal involvement              FOLLOW UP  Treatment Plan/Recommendations:  (SLP at SNF to follow for diet advancement)     Follow Up Needed (Documentation Required): No       Thank you for your referral.   If you have any questions, please contact Hayden Hernandez   Pager [phone]

## 2023-07-10 NOTE — PLAN OF CARE
Patient Eugene Crew. A fib on tele. On room air. No c/o pain. PT/OT seen. Incontinent of bowel and bladder. Possible discharge home today after speech eval.Call light within reach. Will continue to monitor. Problem: Diabetes/Glucose Control  Goal: Glucose maintained within prescribed range  Description: INTERVENTIONS:  - Monitor Blood Glucose as ordered  - Assess for signs and symptoms of hyperglycemia and hypoglycemia  - Administer ordered medications to maintain glucose within target range  - Assess barriers to adequate nutritional intake and initiate nutrition consult as needed  - Instruct patient on self management of diabetes  7/10/2023 1030 by Jesenia Marks RN  Outcome: Progressing  7/10/2023 1030 by Jesenia Marks RN  Outcome: Progressing     Problem: CARDIOVASCULAR - ADULT  Goal: Maintains optimal cardiac output and hemodynamic stability  Description: INTERVENTIONS:  - Monitor vital signs, rhythm, and trends  - Monitor for bleeding, hypotension and signs of decreased cardiac output  - Evaluate effectiveness of vasoactive medications to optimize hemodynamic stability  - Monitor arterial and/or venous puncture sites for bleeding and/or hematoma  - Assess quality of pulses, skin color and temperature  - Assess for signs of decreased coronary artery perfusion - ex.  Angina  - Evaluate fluid balance, assess for edema, trend weights  Outcome: Progressing  Goal: Absence of cardiac arrhythmias or at baseline  Description: INTERVENTIONS:  - Continuous cardiac monitoring, monitor vital signs, obtain 12 lead EKG if indicated  - Evaluate effectiveness of antiarrhythmic and heart rate control medications as ordered  - Initiate emergency measures for life threatening arrhythmias  - Monitor electrolytes and administer replacement therapy as ordered  Outcome: Progressing

## 2023-07-11 ENCOUNTER — SNF VISIT (OUTPATIENT)
Dept: INTERNAL MEDICINE CLINIC | Age: 68
End: 2023-07-11

## 2023-07-11 ENCOUNTER — LAB REQUISITION (OUTPATIENT)
Dept: LAB | Age: 68
End: 2023-07-11

## 2023-07-11 VITALS
OXYGEN SATURATION: 96 % | SYSTOLIC BLOOD PRESSURE: 102 MMHG | TEMPERATURE: 98 F | DIASTOLIC BLOOD PRESSURE: 55 MMHG | RESPIRATION RATE: 20 BRPM | HEART RATE: 78 BPM

## 2023-07-11 DIAGNOSIS — J81.0 ACUTE PULMONARY EDEMA (HCC): ICD-10-CM

## 2023-07-11 DIAGNOSIS — J18.9 PNEUMONIA DUE TO INFECTIOUS ORGANISM, UNSPECIFIED LATERALITY, UNSPECIFIED PART OF LUNG: ICD-10-CM

## 2023-07-11 DIAGNOSIS — R13.10 DYSPHAGIA, UNSPECIFIED TYPE: ICD-10-CM

## 2023-07-11 DIAGNOSIS — E11.9 TYPE 2 DIABETES MELLITUS WITHOUT COMPLICATION, WITHOUT LONG-TERM CURRENT USE OF INSULIN (HCC): ICD-10-CM

## 2023-07-11 DIAGNOSIS — R53.1 WEAKNESS: ICD-10-CM

## 2023-07-11 DIAGNOSIS — K59.03 DRUG-INDUCED CONSTIPATION: ICD-10-CM

## 2023-07-11 DIAGNOSIS — E63.9 POOR NUTRITION: ICD-10-CM

## 2023-07-11 DIAGNOSIS — E87.1 HYPONATREMIA: ICD-10-CM

## 2023-07-11 DIAGNOSIS — I48.21 PERMANENT ATRIAL FIBRILLATION (HCC): ICD-10-CM

## 2023-07-11 DIAGNOSIS — R11.2 NAUSEA AND VOMITING, UNSPECIFIED VOMITING TYPE: ICD-10-CM

## 2023-07-11 DIAGNOSIS — R10.30 LOWER ABDOMINAL PAIN: ICD-10-CM

## 2023-07-11 DIAGNOSIS — C61 PROSTATE CARCINOMA (HCC): Primary | ICD-10-CM

## 2023-07-11 DIAGNOSIS — D64.9 ANEMIA, UNSPECIFIED TYPE: ICD-10-CM

## 2023-07-11 DIAGNOSIS — R63.0 POOR APPETITE: ICD-10-CM

## 2023-07-11 LAB
ALBUMIN SERPL-MCNC: 2.3 G/DL (ref 3.6–5.1)
ALBUMIN/GLOB SERPL: 0.6 {RATIO} (ref 1–2.4)
ALP SERPL-CCNC: 170 UNITS/L (ref 45–117)
ALT SERPL-CCNC: 20 UNITS/L
ANION GAP SERPL CALC-SCNC: 17 MMOL/L (ref 7–19)
AST SERPL-CCNC: 27 UNITS/L
BILIRUB SERPL-MCNC: 0.7 MG/DL (ref 0.2–1)
BLOOD TYPE BARCODE: 5100
BUN SERPL-MCNC: 8 MG/DL (ref 6–20)
BUN/CREAT SERPL: 19 (ref 7–25)
CALCIUM SERPL-MCNC: 8.2 MG/DL (ref 8.4–10.2)
CHLORIDE SERPL-SCNC: 97 MMOL/L (ref 97–110)
CO2 SERPL-SCNC: 22 MMOL/L (ref 21–32)
CREAT SERPL-MCNC: 0.42 MG/DL (ref 0.67–1.17)
FASTING DURATION TIME PATIENT: ABNORMAL H
GFR SERPLBLD BASED ON 1.73 SQ M-ARVRAT: >90 ML/MIN
GLOBULIN SER-MCNC: 3.7 G/DL (ref 2–4)
GLUCOSE SERPL-MCNC: 106 MG/DL (ref 70–99)
POTASSIUM SERPL-SCNC: 3.5 MMOL/L (ref 3.4–5.1)
PROT SERPL-MCNC: 6 G/DL (ref 6.4–8.2)
SODIUM SERPL-SCNC: 132 MMOL/L (ref 135–145)
UNIT VOLUME: 350 ML

## 2023-07-11 PROCEDURE — 99310 SBSQ NF CARE HIGH MDM 45: CPT | Performed by: NURSE PRACTITIONER

## 2023-07-11 PROCEDURE — 80053 COMPREHEN METABOLIC PANEL: CPT | Performed by: CLINICAL MEDICAL LABORATORY

## 2023-07-11 PROCEDURE — 85027 COMPLETE CBC AUTOMATED: CPT | Performed by: CLINICAL MEDICAL LABORATORY

## 2023-07-11 PROCEDURE — PSEU8250 COMPREHENSIVE METABOLIC PANEL: Performed by: CLINICAL MEDICAL LABORATORY

## 2023-07-12 ENCOUNTER — EXTERNAL FACILITY (OUTPATIENT)
Dept: FAMILY MEDICINE CLINIC | Facility: CLINIC | Age: 68
End: 2023-07-12

## 2023-07-12 DIAGNOSIS — C61 PROSTATE CANCER METASTATIC TO BONE (HCC): ICD-10-CM

## 2023-07-12 DIAGNOSIS — R10.30 LOWER ABDOMINAL PAIN: ICD-10-CM

## 2023-07-12 DIAGNOSIS — D69.6 THROMBOCYTOPENIA (HCC): ICD-10-CM

## 2023-07-12 DIAGNOSIS — E87.1 HYPONATREMIA: ICD-10-CM

## 2023-07-12 DIAGNOSIS — I48.21 PERMANENT ATRIAL FIBRILLATION (HCC): ICD-10-CM

## 2023-07-12 DIAGNOSIS — C79.51 PROSTATE CANCER METASTATIC TO BONE (HCC): ICD-10-CM

## 2023-07-12 DIAGNOSIS — C80.1 MENINGEAL CARCINOMATOSIS (HCC): ICD-10-CM

## 2023-07-12 DIAGNOSIS — C79.49 MENINGEAL CARCINOMATOSIS (HCC): ICD-10-CM

## 2023-07-12 DIAGNOSIS — R53.1 WEAKNESS: Primary | ICD-10-CM

## 2023-07-12 LAB
BASOPHILS # BLD: 0 K/MCL (ref 0–0.3)
BASOPHILS NFR BLD: 1 %
DEPRECATED RDW RBC: 48.4 FL (ref 39–50)
EOSINOPHIL # BLD: 0 K/MCL (ref 0–0.5)
EOSINOPHIL NFR BLD: 0 %
ERYTHROCYTE [DISTWIDTH] IN BLOOD: 16 % (ref 11–15)
HCT VFR BLD CALC: 26.2 % (ref 39–51)
HGB BLD-MCNC: 8.2 G/DL (ref 13–17)
LYMPHOCYTES # BLD: 0.4 K/MCL (ref 1–4)
LYMPHOCYTES NFR BLD: 11 %
MCH RBC QN AUTO: 25.9 PG (ref 26–34)
MCHC RBC AUTO-ENTMCNC: 31.3 G/DL (ref 32–36.5)
MCV RBC AUTO: 82.6 FL (ref 78–100)
METAMYELOCYTES NFR BLD: 3 % (ref 0–2)
MICROCYTES BLD QL SMEAR: ABNORMAL
MONOCYTES # BLD: 0.2 K/MCL (ref 0.3–0.9)
MONOCYTES NFR BLD: 4 %
MYELOCYTES # BLD MANUAL: 3 %
NEUTROPHILS # BLD: 3 K/MCL (ref 1.8–7.7)
NEUTS BAND NFR BLD: 2 % (ref 0–10)
NEUTS SEG NFR BLD: 76 %
NRBC BLD MANUAL-RTO: 2 /100 WBC
PLAT MORPH BLD: NORMAL
PLATELET # BLD AUTO: 109 K/MCL (ref 140–450)
POLYCHROMASIA BLD QL SMEAR: ABNORMAL
RBC # BLD: 3.17 MIL/MCL (ref 4.5–5.9)
SPHEROCYTES BLD QL SMEAR: ABNORMAL
WBC # BLD: 3.9 K/MCL (ref 4.2–11)
WBC MORPH BLD: NORMAL
WBC TOXIC VACUOLES BLD QL SMEAR: PRESENT

## 2023-07-12 PROCEDURE — 99310 SBSQ NF CARE HIGH MDM 45: CPT | Performed by: FAMILY MEDICINE

## 2023-07-12 PROCEDURE — G0317 PROLNG NSG FAC E/M EA ADDL 15 MIN: HCPCS | Performed by: FAMILY MEDICINE

## 2023-07-13 ENCOUNTER — SNF VISIT (OUTPATIENT)
Dept: INTERNAL MEDICINE CLINIC | Age: 68
End: 2023-07-13

## 2023-07-13 VITALS
RESPIRATION RATE: 18 BRPM | TEMPERATURE: 98 F | DIASTOLIC BLOOD PRESSURE: 76 MMHG | HEART RATE: 70 BPM | SYSTOLIC BLOOD PRESSURE: 112 MMHG | OXYGEN SATURATION: 96 %

## 2023-07-13 DIAGNOSIS — R10.30 LOWER ABDOMINAL PAIN: ICD-10-CM

## 2023-07-13 DIAGNOSIS — E11.9 TYPE 2 DIABETES MELLITUS WITHOUT COMPLICATION, WITHOUT LONG-TERM CURRENT USE OF INSULIN (HCC): ICD-10-CM

## 2023-07-13 DIAGNOSIS — D64.9 ANEMIA, UNSPECIFIED TYPE: ICD-10-CM

## 2023-07-13 DIAGNOSIS — E63.9 POOR NUTRITION: ICD-10-CM

## 2023-07-13 DIAGNOSIS — J18.9 PNEUMONIA DUE TO INFECTIOUS ORGANISM, UNSPECIFIED LATERALITY, UNSPECIFIED PART OF LUNG: ICD-10-CM

## 2023-07-13 DIAGNOSIS — R53.1 WEAKNESS: ICD-10-CM

## 2023-07-13 DIAGNOSIS — J81.0 ACUTE PULMONARY EDEMA (HCC): ICD-10-CM

## 2023-07-13 DIAGNOSIS — R63.0 POOR APPETITE: ICD-10-CM

## 2023-07-13 DIAGNOSIS — I48.21 PERMANENT ATRIAL FIBRILLATION (HCC): ICD-10-CM

## 2023-07-13 DIAGNOSIS — E87.1 HYPONATREMIA: ICD-10-CM

## 2023-07-13 DIAGNOSIS — C61 PROSTATE CARCINOMA (HCC): ICD-10-CM

## 2023-07-13 DIAGNOSIS — R11.2 NAUSEA AND VOMITING, UNSPECIFIED VOMITING TYPE: Primary | ICD-10-CM

## 2023-07-13 PROCEDURE — 99310 SBSQ NF CARE HIGH MDM 45: CPT | Performed by: NURSE PRACTITIONER

## 2023-07-14 NOTE — PROGRESS NOTES
Harris Gaspar Author: Criss Swartz MD     1955 MRN [id number]   [facility]  Admission 23      Last Hospital Discharge [facility]10[facility]         CC--readmitted to [facility] from [facility], fever, A-fib with RVR    H. P.I Harris Gaspar is a 74year old male with past medical history of prostate cancer with metastasis to the brain, atrial fibrillation not on any anticoagulation due to bleeding and the brain mets, anemia, chronic hyponatremia, diabetes mellitus, gastroesophageal reflux [facility]. Patient's prostate cancer is advanced and family has refused hospice. Patient was sent out to the emergency room after he developed fever and sudden weakness as well as shortness of breath. 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 He was given IV Lasix that improved his pulmonary edema,  He was resumed on beta-blocker after his heart rate normalized  He was found to be anemic, patient was transfused 1 unit of packed red blood cells, patient improved energy wise became more alert and awake  He was continued on Decadron and pain medications for the bone pain  Family was again approached by palliative care but they refused any palliative or hospice services  Patient [facility]    Is seen today in his room with his family, daughter and son daughter-in-law.   And wife in the room  Patient is alert and awake and denies any pain  He looks depressed  Patient has been refusing physical therapy        Wt Readings from Last 3 Encounters:  07/09/23 : 225 lb 15.5 oz (102.5 kg)  07/06/23 : 226 lb (102.5 kg)  06/29/23 : 226 lb (102.5 kg)  BP Readings from Last 3 Encounters:  07/13/23 : 112/76  07/11/23 : 102/55  07/10/23 : 102/65     Past Medical History:   Diagnosis Date    Arrhythmia     Diabetes ([facility] [address]     Mitral stenosis 2011    Prostate cancer ([facility] 07/2021     No past surgical history on file. No family history on file. Social History     Socioeconomic History    Marital status:    Tobacco Use    Smoking status: Never    Smokeless tobacco: Never   Vaping Use    Vaping Use: Never used   Substance and Sexual Activity    Alcohol use: Never    Drug use: Never       ALLERGIES:  No Known Allergies    CURRENT MEDICATIONS   See the list in pointclick care    Review of Systems:   Constitutional: No fevers, chills, fatigue or night sweats. ENT: No mouth pain, neck pain, running nose, headaches or swollen glands. Skin: No rashes, pruritus or skin changes,  Respiratory: Denies cough, wheezing or shortness of breath. CV: Denies chest pain, palpitations, orthopnea, PND or dizziness. Musculoskeletal: No joint pain, stiffness or swelling. GI: No nausea, vomiting or diarrhea. No blood in stools. Neurologic: No seizures, tremors, weakness or numbness.     VITALS: Pulse 72/min respiration 18/min temperature 97.6 blood pressure 120/76  Physical exam  GENERAL: well developed, well nourished, appears to be in pain  SKIN: no rashes, no suspicious lesions  HEENT: atraumatic, normocephalic, moist mucous membranes  NECK: supple, no adenopathy, no bruits  CHEST: no chest tenderness  LUNGS: clear to auscultation  CARDIO: RRR without murmur  GI: good BS's, no masses, left lower quadrant abdominal pain, no rebound  MUSCULOSKELETAL: back is not tender, FROM of the back  EXTREMITIES: no cyanosis, clubbing or edema  NEURO: Oriented times three, cranial nerves are intact, motor and sensory are grossly intact  Recent Labs   Lab 07/09/23  1907 07/10/23  0642   WBC 4.7 4.2   HGB 7.4* 8.6*   MCV 82.5 83.4   .0* 106.0*   BAND 2 5              Recent Labs   Lab 07/09/23  1908 07/10/23  0642   * 113*   BUN 8 9   CREATSERUM 0.48* 0.39*   CA 8.6 8.5   ALB 2.3*  --    * 130*   K 4.1 3.6   CL 97* 98   CO2 22.0 22.0   ALKPHO 188*  --    AST 38*  --    ALT 16  --    BILT 0.8  --    TP 6.5  --      ASSESSMENT AND PLAN:  Diagnoses and all orders for this visit:    Weakness  Physical therapy/Occupational Therapy  I had a long discussion with the patient regarding therapy and he is motivated now to do the physical therapy  Discussed with the patient, his wife and his daughter regarding the importance  Prostate cancer metastatic to bone [facility])  Meningeal carcinomatosis ([facility] [address]  Not a candidate for chemo or radiation  Pain control, gabapentin, morphine sulfate, oxycodone  Permanent atrial fibrillation (HCC)  Rate controlled now  Continue metoprolol  Hyponatremia  Monitor sodium  Lower abdominal pain  Possibly due to constipation  Continue senna and MiraLAX  Thrombocytopenia (HCC)  We will continue to monitor him    Again I had a long discussion with the patient's family regarding importance of physical therapy to help with the healing.-   - 24h nursing for medication administration, bowel/bladder care, pain/sleep assessment  - Physician supervision for multiple medical comorbidities, fall risk, DVT risk, infection risk, pain management  - Psych for adjustment to disability and cognitive deficits  -Hospital medications reviewed reconciled and restarted   Check his labs in the morning    Time spent at appointment today is 85 minutes including preparing to see patient, reviewing test results, performing medically appropriate examination and evaluation and coordinating care, counseling and educating patient/family, ordering medications and testing, and documenting clinical information in EMR.       Sharon Beavers MD   [address] [facility] [place], [address] [place] [address] [facility] [phone]    Electronically signed

## 2023-07-17 NOTE — DISCHARGE SUMMARY
[facility] HOSPITALIST  DISCHARGE SUMMARY     Rodriguez Garcia Patient Status:  Inpatient    1955 MRN [id number]   [facility] 8NE-A Attending No att. providers found   Hosp Day # 1 PCP Carlo Chauhan MD     Date of Admission: 2023  Date of Discharge:  7/10/2023  Discharge Disposition: SNF Subacute Rehab    Discharge Diagnosis:  #Generalized weakness with failure to thrive  #Metabolic encephalopathy  #Afib RVR  #Mild pulm edema  #Recent PNA  #Chronic normocytic anemia  #Dysphagia  #Recent history of GIB   #Metastatic prostate cancer with osseous mets, pleural met, leptomeningeal disease s/p chemo and RT  #Cancer pain   #DMII   #GERD  #BPH    History of Present Illness: (per Dr. Cindy Siu), Rodriguez aGrcia is a 74year old male with PMHx atrial fibrillation not on anticoagulation due to bleeding and brain metastases, metastatic prostate cancer with leptomeningeal disease, normocytic anemia, chronic hyponatremia, diabetes mellitus type 2 and GERD who presents to the hospital with generalized weakness and shortness of breath. In regards to his metastatic prostate cancer, he is no longer a candidate for therapy. Last admission in , family was not ready to pursue hospice care. He was admitted for pneumonia and was treated with IV Unasyn and transition to Augmentin for total of 7 days at the nursing home. He returns today for shortness of breath. He was found to be in atrial fibrillation with RVR in the ER. He was given Cardizem. Chest x-ray with maybe mild pulmonary edema but no clear evidence of consolidation. Procalcitonin is negative. proBNP is 2800. Lactic acid is normal.     Brief Synopsis: admitted from SNF for generalized weakness. He was treated supportively. He was monitored off of empiric abx as his infectious w/u was unremarkable. His clinical condition improved. He was discharged back to his SNF with recommendation to f/u with his PCP and oncology in outpt setting. Lace+ Score: 81  59-90 High Risk  29-58 Medium Risk  0-28   Low Risk       TCM Follow-Up Recommendation:  LACE > 58: High Risk of readmission after discharge from the hospital.  **Certification    Admission date was 7/9/2023. Inpatient stay was shorter than expected. Patient's Hyponatremia was initially serious enough to expect a more lengthy hospitalization but patient improved faster than expected. Procedures during hospitalization:   none    Incidental or significant findings and recommendations (brief descriptions):  As above    Lab/Test results pending at Discharge:   none    Consultants:  none    Discharge Medication List:     Discharge Medications        CHANGE how you take these medications        Instructions Prescription details   metoprolol succinate ER 25 MG Tb24  Commonly known as: Toprol XL  What changed: when to take this      Take 1 tablet (25 mg total) by mouth Daily Beta Blocker. Quantity: 90 tablet  Refills: 2            CONTINUE taking these medications        Instructions Prescription details   ALIGN PREBIOTIC-PROBIOTIC OR      Take 1 capsule by mouth in the morning and 1 capsule before bedtime. Refills: 0     dexamethasone 4 MG tablet  Commonly known as: Decadron      Take 1 tablet (4 mg total) by mouth daily with breakfast.   Quantity: 40 tablet  Refills: 0     Dulcolax 10 MG Supp  Generic drug: bisacodyl      Place 1 suppository (10 mg total) rectally daily as needed (constipation). Refills: 0     finasteride 5 MG Tabs  Commonly known as: Proscar      Take 1 tablet (5 mg total) by mouth daily. Refills: 0     Flomax 0.4 MG Caps  Generic drug: tamsulosin      Take 1 capsule (0.4 mg total) by mouth daily. Refills: 0     gabapentin 100 MG Caps  Commonly known as: Neurontin      Take 1 capsule (100 mg total) by mouth daily.    Refills: 0     metFORMIN HCl 1000 MG Tabs  Commonly known as: GLUCOPHAGE      Take 1 tablet (1,000 mg total) by mouth 2 (two) times daily with meals. Refills: 0     morphINE ER 15 MG Tbcr  Commonly known as: MS Contin      Take 2 tablets (30 mg total) by mouth every 12 (twelve) hours. Quantity: 60 tablet  Refills: 0     Multi-Vitamin/Minerals Tabs      Take 1 tablet by mouth daily. Refills: 0     ondansetron 4 MG Tbdp  Commonly known as: Zofran-ODT      Take 1 tablet (4 mg total) by mouth every 8 (eight) hours as needed for Nausea. Quantity: 30 tablet  Refills: 0     oxyCODONE 15 MG Tabs      Take 1 tablet (15 mg total) by mouth every 6 (six) hours as needed for Pain. Quantity: 30 tablet  Refills: 0     pantoprazole 40 MG Tbec  Commonly known as: Protonix      Take 1 tablet (40 mg total) by mouth 2 (two) times daily before meals. Quantity: 60 tablet  Refills: 0     polyethylene glycol (PEG 3350) 17 g Pack  Commonly known as: Miralax      Take 17 g by mouth in the morning and 17 g before bedtime. Refills: 0     senna-docusate 8.6-50 MG Tabs  Commonly known as: Senokot-S      Take 1 tablet by mouth in the morning and 1 tablet before bedtime. Refills: 0            STOP taking these medications      amoxicillin clavulanate 875-125 MG Tabs  Commonly known as: Augmentin               ASK your doctor about these medications        Instructions Prescription details   doxycycline 100 MG Caps  Commonly known as: Vibramycin  Ask about: Should I take this medication? Take 1 capsule (100 mg total) by mouth 2 (two) times daily. Stop taking on: July 13, 2023  Refills: 0     ipratropium-albuterol 0.5-2.5 (3) MG/3ML Soln  Commonly known as: Duoneb  Ask about: Should I take this medication? Take 3 mL by nebulization 3 (three) times daily.    Stop taking on: July 16, 2023  Refills: 0              [place]    Follow-up appointment:   Kumar Caputo, [address] [facility]  [phone]    Follow up in 1 week(s)      Appointments for Next 30 Days 7/17/2023 - 8/16/2023      None            Vital signs: Physical Exam:    General: No acute distress   Lungs: clear to auscultation  Cardiovascular: S1, S2  Abdomen: Soft    -----------------------------------------------------------------------------------------------  PATIENT DISCHARGE INSTRUCTIONS: See electronic chart    Salome Drafts, DO    Total time spent on discharge plannin minutes     The [facility] makes medical notes like these available to patients in the interest of transparency. Please be advised this is a medical document. Medical documents are intended to carry relevant information, facts as evident, and the clinical opinion of the practitioner. The medical note is intended as peer to peer communication and may appear blunt or direct. It is written in medical language and may contain abbreviations or verbiage that are unfamiliar.

## 2023-07-18 ENCOUNTER — SNF VISIT (OUTPATIENT)
Dept: INTERNAL MEDICINE CLINIC | Age: 68
End: 2023-07-18

## 2023-07-18 VITALS
HEART RATE: 90 BPM | DIASTOLIC BLOOD PRESSURE: 58 MMHG | TEMPERATURE: 98 F | OXYGEN SATURATION: 96 % | RESPIRATION RATE: 19 BRPM | SYSTOLIC BLOOD PRESSURE: 109 MMHG

## 2023-07-18 DIAGNOSIS — D64.9 ANEMIA, UNSPECIFIED TYPE: ICD-10-CM

## 2023-07-18 DIAGNOSIS — R10.30 LOWER ABDOMINAL PAIN: ICD-10-CM

## 2023-07-18 DIAGNOSIS — E87.1 HYPONATREMIA: Primary | ICD-10-CM

## 2023-07-18 DIAGNOSIS — E11.9 TYPE 2 DIABETES MELLITUS WITHOUT COMPLICATION, WITHOUT LONG-TERM CURRENT USE OF INSULIN (HCC): ICD-10-CM

## 2023-07-18 DIAGNOSIS — R63.0 POOR APPETITE: ICD-10-CM

## 2023-07-18 DIAGNOSIS — C61 PROSTATE CARCINOMA (HCC): ICD-10-CM

## 2023-07-18 DIAGNOSIS — E63.9 POOR NUTRITION: ICD-10-CM

## 2023-07-18 DIAGNOSIS — R11.2 NAUSEA AND VOMITING, UNSPECIFIED VOMITING TYPE: ICD-10-CM

## 2023-07-18 DIAGNOSIS — R53.1 WEAKNESS: ICD-10-CM

## 2023-07-18 DIAGNOSIS — I48.21 PERMANENT ATRIAL FIBRILLATION (HCC): ICD-10-CM

## 2023-07-18 DIAGNOSIS — Z87.19 H/O: GI BLEED: ICD-10-CM

## 2023-07-18 PROCEDURE — 99310 SBSQ NF CARE HIGH MDM 45: CPT | Performed by: NURSE PRACTITIONER

## 2023-07-18 RX ORDER — SCOLOPAMINE TRANSDERMAL SYSTEM 1 MG/1
1 PATCH, EXTENDED RELEASE TRANSDERMAL
Status: ON HOLD | COMMUNITY

## 2023-07-19 PROBLEM — C79.9 METASTASIS FROM MALIGNANT TUMOR OF PROSTATE (HCC): Status: ACTIVE | Noted: 2023-07-19

## 2023-07-19 PROBLEM — C79.9 METASTASIS FROM MALIGNANT TUMOR OF PROSTATE (HCC): Status: ACTIVE | Noted: 2023-01-01

## 2023-07-19 PROBLEM — C61 METASTASIS FROM MALIGNANT TUMOR OF PROSTATE (HCC): Status: ACTIVE | Noted: 2023-07-19

## 2023-07-19 PROBLEM — C61 METASTASIS FROM MALIGNANT TUMOR OF PROSTATE (HCC): Status: ACTIVE | Noted: 2023-01-01

## 2023-07-19 PROBLEM — C79.31 MALIGNANT NEOPLASM METASTATIC TO BRAIN (HCC): Status: ACTIVE | Noted: 2023-01-01

## 2023-07-19 PROBLEM — C79.31 MALIGNANT NEOPLASM METASTATIC TO BRAIN (HCC): Status: ACTIVE | Noted: 2023-07-19

## 2023-07-19 NOTE — ED QUICK NOTES
Med onc floor not taking patient d/t patients cardiac rhythm  MD Muniz aware, ok to go to tele floor instead

## 2023-07-19 NOTE — ED INITIAL ASSESSMENT (HPI)
PATIENT TO ER VIA EMS FROM NURSING HOME D/T INCREASED WEAKNESS/FATIGUE. HX CANCER. RECENT ADMISSION TO HOSPITAL FOR SIMILAR SYMPTOMS.

## 2023-07-19 NOTE — ED QUICK NOTES
DAUGHTER AT BEDSIDE  PER DAUGHTER, PATIENT HAS BEEN MORE WEAK TODAY.  VOMITED X1 PRIOR TO ARRIVAL TO ER

## 2023-07-19 NOTE — PROGRESS NOTES
ED Antibiotic Dose Adjustment by Pharmacy    Piperacillin/tazobactam 3.375 gm IVPB x1 dose has been ordered. Pharmacy has adjusted the dose to piperacillin/tazobactam 4.5 gm IVPB x1 per hospital protocol based on actual body weight > 100 kg.     Thank you,  Lauren Li, PharmD, BCPS, [facility]  07/19/23; 1:34 PM

## 2023-07-19 NOTE — ED QUICK NOTES
Orders for admission, patient is aware of plan and ready to go upstairs. Any questions, please call ED RN Max Giron at extension [phone].      Patient Covid vaccination status: Fully vaccinated     COVID Test Ordered in ED: None    COVID Suspicion at Admission: N/A    Running Infusions:    sodium chloride 3,075 mL (07/19/23 1348)        Mental Status/LOC at time of transport: responsive to painful stimuli    Other pertinent information:   CIWA score: N/A   NIH score:  N/A     Fall risk  Afib   Iv abx given in ER, see MAR  Fluid bolus infusing, see MAR  Daughter at bedside  Son= POA

## 2023-07-20 PROBLEM — C61 METASTASIS FROM HORMONE-REFRACTORY PROSTATE CANCER (HCC): Status: ACTIVE | Noted: 2023-07-20

## 2023-07-20 PROBLEM — C61 METASTASIS FROM HORMONE-REFRACTORY PROSTATE CANCER (HCC): Status: ACTIVE | Noted: 2023-01-01

## 2023-07-20 PROBLEM — R41.82 ALTERED MENTAL STATUS: Status: ACTIVE | Noted: 2023-01-01

## 2023-07-20 PROBLEM — C79.9 METASTASIS FROM HORMONE-REFRACTORY PROSTATE CANCER (HCC): Status: ACTIVE | Noted: 2023-07-20

## 2023-07-20 PROBLEM — C78.7 CANCER, METASTATIC TO LIVER (HCC): Status: ACTIVE | Noted: 2023-07-20

## 2023-07-20 PROBLEM — C78.7 CANCER, METASTATIC TO LIVER (HCC): Status: ACTIVE | Noted: 2023-01-01

## 2023-07-20 PROBLEM — C79.9 METASTASIS FROM HORMONE-REFRACTORY PROSTATE CANCER (HCC): Status: ACTIVE | Noted: 2023-01-01

## 2023-07-20 PROBLEM — C79.49: Status: ACTIVE | Noted: 2023-07-20

## 2023-07-20 PROBLEM — C79.49: Status: ACTIVE | Noted: 2023-01-01

## 2023-07-20 PROBLEM — R41.82 ALTERED MENTAL STATUS: Status: ACTIVE | Noted: 2023-07-20

## 2023-07-20 NOTE — PROGRESS NOTES
Residential liaison received hospice referral, liaison spoke with son Magy Long and agreed to meet today to discuss hospice at [phone] bedside. Meeting scheduled.

## 2023-07-20 NOTE — CM/SW NOTE
Received consult for hospice care. SW spoke with [facility] to make aware of new referral. SW requested to have Atiya update MALLORY on status of meeting/updates. Pt admitted from [facility]. Pt has had frequent readmissions.      LEA Conroy, [facility]  Discharge Planner  [id number]

## 2023-07-20 NOTE — PROGRESS NOTES
NURSING ADMISSION NOTE      Patient admitted via Cart  Oriented to room. Safety precautions initiated. Bed in low position. Call light in reach. Patient lethargic at time of admission. Unable to safely swallow liquids/medications. Physician aware. BPA sepsis alert, physician notified. Total assist, patient unable to turn on own. On RA. A fib on tele. Incontinent of bowel and bladder. Skin assessment completed with QUAN Hughes. Skin WNL. Family at bedside. POC discussed with patient's son Salome Olguin. Fall precautions in place. Call light within reach.

## 2023-07-20 NOTE — PHYSICAL THERAPY NOTE
PHYSICAL THERAPY EVALUATION - INPATIENT     Room Number: 5373/4342-L  Evaluation Date: 7/20/2023  Type of Evaluation: Initial  Physician Order: PT Eval and Treat    Presenting Problem: incr weakness and fatigue  Co-Morbidities : afib, metastatic prostate cancer to brain, bone and pleura  Reason for Therapy: Mobility Dysfunction and Discharge Planning    Recent Admissions:   7/9-7/10: RYLEY, weakness, pneumonia -->DC [facility] --> [facility]  6/11-6/26/23- nausea and vomiting --> Dc [facility] --> [facility]   4/11-4/13/23 for Hematemsis in the setting of supratherapeutic INR --> DC'd home  3/1-3/10/23 for neoplasm related pain --> DC'd [facility]    ASSESSMENT   Pt is a 74year old male admitted on 7/19/2023 for incr weakness and fatigue. Functional outcome measures completed include AMPAC. The AM-PAC '6-Clicks' Inpatient Basic Mobility Short Form was completed and this patient is demonstrating a Approx Degree of Impairment: 68.66%  degree of impairment in mobility. Research supports that patients with this level of impairment may benefit from 24 hour care and supervision. Patient completed supine to sitting EOB at maxA x 2 requiring hand over hand initiation to complete task. Pt with incr difficulty maintaining static sitting balance, Guided LUE to hold bed rail to reach static sitting balance. Sit to stand attempt completed at maxA x 2, but pt unable to complete task as pt then had bowel movement. Sit to supine completed at modA x 2. Recent APN visit 7/13 at [facility], makes mention that patient has made little progress with therapy while at rehab. Recommendation to 24 hour care and supervision discussed with pt's son and he is in agreement. RN and SW informed. Per chart review, hospice meeting and care conference meetings schedule for today (7/20) and (7/21).    PT Discharge Recommendations: 24 hour care/supervision      PLAN  Patient has been evaluated and presents with no skilled Physical Therapy needs at this time. Patient discharged from Physical Therapy services. Please re-order if a new functional limitation presents during this admission. GOALS  Patient was able to achieve the following goals . .. Patient was able to transfer At previous, functional level   Patient able to ambulate on level surfaces At previous, functional level         HOME SITUATION  Type of Home: House   Home Layout: Two level  Stairs to Enter : 6     Stairs to Bedroom: 6  Railing: Yes    Lives With: Spouse; Son     Patient Owned Equipment: Rolling walker; Wheelchair;Hospital bed       Prior Level of Mora: From eval on 6/12/23: son reports pt lives with him and pt's wife. Pt has been requiring assist for ADLs and mobility. Son reports they have hospital bed. Son assists with bed mobility and ambulation with RW, son follows him with chair, only able to tolerate ambulating short distances. Per APN note from visit at [place] [id number], pt note doing much with therapy. Per pt's son was ambulating in parallel bars with 2 people. Was completing UE ROM exercises. SUBJECTIVE  Pt very lethargic. OBJECTIVE  Precautions: Bed/chair alarm;  needed (Tajik speaking)  Fall Risk: High fall risk    WEIGHT BEARING RESTRICTION  Weight Bearing Restriction: None                PAIN ASSESSMENT  Rating: Unable to rate  Location: pt unable to state where  Management Techniques: Activity promotion; Body mechanics;Repositioning    COGNITION  Overall Cognitive Status:  Impaired  Arousal/Alertness:  delayed responses to stimuli and inconsistent responses to stimuli  Attention Span:  difficulty attending to directions  Initiation: cues to initiate tasks    RANGE OF MOTION AND STRENGTH ASSESSMENT  Upper extremity ROM and strength are within functional limits     Lower extremity ROM is within functional limits     Lower extremity strength is within functional limits       BALANCE  Static Sitting: Fair  Dynamic Sitting: Fair -  Static Standing: Not tested  Dynamic Standing: Not tested    ADDITIONAL TESTS                                    ACTIVITY TOLERANCE                         O2 WALK       NEUROLOGICAL FINDINGS                        AM-PAC '6-Clicks' INPATIENT SHORT FORM - BASIC MOBILITY  How much difficulty does the patient currently have. .. Patient Difficulty: Turning over in bed (including adjusting bedclothes, sheets and blankets)?: A Little   Patient Difficulty: Sitting down on and standing up from a chair with arms (e.g., wheelchair, bedside commode, etc.): A Lot   Patient Difficulty: Moving from lying on back to sitting on the side of the bed?: A Little   How much help from another person does the patient currently need. .. Help from Another: Moving to and from a bed to a chair (including a wheelchair)?: A Lot   Help from Another: Need to walk in hospital room?: Total   Help from Another: Climbing 3-5 steps with a railing?: Total       AM-PAC Score:  Raw Score: 12   Approx Degree of Impairment: 68.66%   Standardized Score (AM-PAC Scale): 35.33   CMS Modifier (G-Code): CL    FUNCTIONAL ABILITY STATUS  Gait Assessment   Functional Mobility/Gait Assessment  Gait Assistance: Not tested    Skilled Therapy Provided     Bed Mobility:  Rolling: maxA x 2  Supine to sit: maxA    Sit to supine: modA x 2      Transfer Mobility:  Sit to stand: maxA x 2    Stand to sit: maxA x 2  Gait = NT    Therapist's comments: Patient presents resting in bed. Son present in room throughout session. Per pt's son, pt has been doing minimal at rehab. Bed mobility maxA x 2, sit to supine modA x 2, attempt x 1 at sit to stand maxA x 2 to RW. Pt with PCT at end of session. RN and SW made aware. Exercise/Education Provided:  Bed mobility  Body mechanics  Energy conservation  Functional activity tolerated  Transfer training    Patient End of Session: In bed; With [facility] staff;Needs met;Call light within reach;RN aware of session/findings; All patient questions and concerns addressed; Alarm set; Family present; Discussed recommendations with /    Patient Evaluation Complexity Level:  History Moderate - 1 or 2 personal factors and/or co-morbidities   Examination of body systems Low - addressing 1-2 elements   Clinical Presentation Low - Stable   Clinical Decision Making Low Complexity       PT Session Time: 20 minutes  Gait Training:  minutes  Therapeutic Activity: 10 minutes  Neuromuscular Re-education:  minutes  Therapeutic Exercise:  minutes

## 2023-07-20 NOTE — CM/SW NOTE
Voicemail message left with pt's son Chloe See to discuss care conference that was scheduled for 8am on Friday 7/21 at 8:00am with Hospitalist, Dr Ivonne Angel, hospice, and case management. Requested a call back.      LEA Lim, [facility]  Discharge Planner  [id number]

## 2023-07-20 NOTE — CM/SW NOTE
MALLORY spoke with son, Bienvenido Crowley, regarding care conference for the morning. Informed of the time and date (7/21 at 8:00am) in the 8th floor conference room. Notified treatment team of the update. Son stated that he will inform daughter and pt's brother of the meeting time.      LEA Azevedo, [facility]  Discharge Planner  [id number]

## 2023-07-20 NOTE — PLAN OF CARE
Pt lethargic but responds to verbal stimuli. O2 saturations WNL on room air. Afib on tele monitor. Family at bedside. Hospice to meet with family today. Family updated on POC. Bed in lowest position. Call light within reach. Problem: Diabetes/Glucose Control  Goal: Glucose maintained within prescribed range  Description: INTERVENTIONS:  - Monitor Blood Glucose as ordered  - Assess for signs and symptoms of hyperglycemia and hypoglycemia  - Administer ordered medications to maintain glucose within target range  - Assess barriers to adequate nutritional intake and initiate nutrition consult as needed  - Instruct patient on self management of diabetes  Outcome: Progressing     Problem: Patient/Family Goals  Goal: Patient/Family Long Term Goal  Description: Patient's Long Term Goal:     Interventions:  -   - See additional Care Plan goals for specific interventions  Outcome: Progressing  Goal: Patient/Family Short Term Goal  Description: Patient's Short Term Goal:     Interventions:   -   - See additional Care Plan goals for specific interventions  Outcome: Progressing     Problem: NEUROLOGICAL - ADULT  Goal: Achieves stable or improved neurological status  Description: INTERVENTIONS  - Assess for and report changes in neurological status  - Initiate measures to prevent increased intracranial pressure  - Maintain blood pressure and fluid volume within ordered parameters to optimize cerebral perfusion and minimize risk of hemorrhage  - Monitor temperature, glucose, and sodium.  Initiate appropriate interventions as ordered  Outcome: Progressing  Goal: Achieves maximal functionality and self care  Description: INTERVENTIONS  - Monitor swallowing and airway patency with patient fatigue and changes in neurological status  - Encourage and assist patient to increase activity and self care with guidance from PT/OT  - Encourage visually impaired, hearing impaired and aphasic patients to use assistive/communication devices  Outcome: Progressing

## 2023-07-20 NOTE — HOSPICE RN NOTE
[facility] team visit for follow up on hospice consult order. Met with [facility] and [facility]. Discussed hospice benefit, hospice philosophy, palliative care with questions and concerns addressed. Family is not in agreement with hospice at this time. [facility] will continue to follow as needed. Please call [facility] should family change the direction of care to hospice.      Tracy Diaz RN, [address] Liaison  [phone] after hours

## 2023-07-20 NOTE — PLAN OF CARE
Assumed care of pt at [address] [place] [address]. Pt lethargic and responds to pain stimuli, no signs of any pain or shortness of breath. Lung sounds diminished bilaterally, afib on tele. Abdomen rounded, non tender. Pt NPO d/t mental status and inability to swallow on command, unable to perform RN dysphagia screening. Bed locked and in lowest position, call light within reach. POC: speech to see, PT/OT to see, continue IVF, continue IV antibiotics, monitor HR. Pt updated, all questions answered, verbalized understanding. Problem: Diabetes/Glucose Control  Goal: Glucose maintained within prescribed range  Description: INTERVENTIONS:  - Monitor Blood Glucose as ordered  - Assess for signs and symptoms of hyperglycemia and hypoglycemia  - Administer ordered medications to maintain glucose within target range  - Assess barriers to adequate nutritional intake and initiate nutrition consult as needed  - Instruct patient on self management of diabetes  Outcome: Progressing     Problem: Patient/Family Goals  Goal: Patient/Family Long Term Goal  Description: Patient's Long Term Goal:     Interventions:  -   - See additional Care Plan goals for specific interventions  Outcome: Progressing  Goal: Patient/Family Short Term Goal  Description: Patient's Short Term Goal:     Interventions:   -   - See additional Care Plan goals for specific interventions  Outcome: Progressing     Problem: NEUROLOGICAL - ADULT  Goal: Achieves stable or improved neurological status  Description: INTERVENTIONS  - Assess for and report changes in neurological status  - Initiate measures to prevent increased intracranial pressure  - Maintain blood pressure and fluid volume within ordered parameters to optimize cerebral perfusion and minimize risk of hemorrhage  - Monitor temperature, glucose, and sodium.  Initiate appropriate interventions as ordered  Outcome: Progressing  Goal: Achieves maximal functionality and self care  Description: INTERVENTIONS  - Monitor swallowing and airway patency with patient fatigue and changes in neurological status  - Encourage and assist patient to increase activity and self care with guidance from PT/OT  - Encourage visually impaired, hearing impaired and aphasic patients to use assistive/communication devices  Outcome: Progressing

## 2023-07-20 NOTE — SLP NOTE
ADULT SWALLOWING EVALUATION    ASSESSMENT    ASSESSMENT/OVERALL IMPRESSION:  Pt NPO d/t mental status and inability to swallow on command, RN unable to perform dysphagia screening. Orders were received for a bedside swallowing evaluation to determine safest least restrictive diet. Patient is a 74year old male with PMHx atrial fibrillation not on anticoagulation due to bleeding and brain metastases, metastatic prostate cancer with leptomeningeal disease, normocytic anemia, chronic hyponatremia, diabetes mellitus type 2 and GERD who presents to the hospital due to generalized weakness and lethargy. He was admitted on 7/9/23 for failure to thrive and generalized weakness and was discharged back to subacute rehab the following day. Family still wants to keep patient full code due to Congregational reasons. Patient known to this department from previous admits with most recently on puree and thin liquids. Patient contacted at the bedside. He was seated upright and midline in bed. Patient lethargic and required constant cues to remain on task. Oral motor mechanism exam revealed weak and minimal movement of oral musculature with weak vocal quality and periods of aphonia. Patient is edentulous. Patient with weak cough on command. Assessed patient with thin liquids via spoon, cup, and straw, puree, and solids. Oral phase revealed functional oral acceptance, retrieval and mastication of solids with timely and complete clearing of the oral cavity. Demonstrated weak propulsion and increased oral transit times of thin liquids and puree items. Patient with apparent timely initiation of pharyngeal phase with weak hyolaryngeal elevation on palpation, no coughing or throat clearing. Patient with an impaired oral phase and apparent impaired pharyngeal phase of swallow without any overt clinical s/s of aspiration.  Recommend cautious 1:1 feeding of puree solids and thin liquids by spoon at slow rate, small bites and sips, cues to swallow. Educated family on results and recommendations with understanding verbalized. Collaborated with RN who was in agreement. RECOMMENDATIONS   Diet Recommendations - Solids: Puree  Diet Recommendations - Liquids: Thin Liquids                        Compensatory Strategies Recommended: Small bites and sips; Slow rate; Liquids via spoon  Aspiration Precautions: Upright position; Slow rate; No straw;Small bites and sips  Medication Administration Recommendations: Crushed in puree  Treatment Plan/Recommendations: Aspiration precautions  Discharge Recommendations/Plan: Undetermined    HISTORY   MEDICAL HISTORY  Reason for Referral: R/O aspiration    Problem List  Principal Problem:    Metastasis from malignant tumor of prostate [facility])  Active Problems:    Malignant neoplasm metastatic to brain [facility])    Metastasis from hormone-refractory prostate cancer ([facility]    Cancer, metastatic to liver ([facility]    Cancer with leptomeningeal spread ([facility]    Altered mental status      Past Medical History  Past Medical History:   Diagnosis Date    Arrhythmia     Diabetes ([facility]     Mitral stenosis 2011    Prostate cancer ([facility] 07/2021       Prior Living Situation: Home with spouse;Home with support  Diet Prior to Admission: Puree; Thin liquids  Precautions: Aspiration    Patient/Family Goals: To eat and drink    SWALLOWING HISTORY  Current Diet Consistency: NPO  Dysphagia History:   Imaging Results:       SUBJECTIVE       OBJECTIVE   ORAL MOTOR EXAMINATION  Dentition: Edentulous  Symmetry: Within Functional Limits  Strength: Unable to assess  Tone: Unable to assess  Range of Motion: Unable to assess (minimal movement)  Rate of Motion: Reduced    Voice Quality: Weak (periods of aphonia)  Respiratory Status: Unlabored  Consistencies Trialed:  Thin liquids;Puree  Method of Presentation: Staff/Clinician assistance;Spoon;Single sips  Patient Positioning: Upright;Midline (in bed)    Oral Phase of Swallow: Impaired  Bolus Retrieval: Intact  Bilabial Seal: Intact  Bolus Formation: Impaired  Bolus Propulsion: Impaired     Retention: Intact    Pharyngeal Phase of Swallow: Impaired  Laryngeal Elevation Timing: Appears intact  Laryngeal Elevation Strength: Appears impaired  Laryngeal Elevation Coordination: Appears intact  (Please note: Silent aspiration cannot be evaluated clinically. Videofluoroscopic Swallow Study is required to rule-out silent aspiration.)    Esophageal Phase of Swallow: No complaints consistent with possible esophageal involvement  Comments:               GOALS  Goal #1 The patient will tolerate puree consistency and thin liquids without overt signs or symptoms of aspiration with 95 % accuracy over 1-2 session(s). In Progress   Goal #2 The patient/family/caregiver will demonstrate understanding and implementation of aspiration precautions and swallow strategies independently over 1-2 session(s). In Progress   Goal #3 The patient will utilize compensatory strategies as outlined by  BSSE (clinical evaluation) including Slow rate, Small bites, Small sips, No straws, Upright 90 degrees, Feed patient with max assistance 95 % of the time across 2 sessions.   In Progress     FOLLOW UP  Treatment Plan/Recommendations: Aspiration precautions  Number of Visits to Meet Established Goals: 2  Follow Up Needed (Documentation Required): Yes  SLP Follow-up Date: 07/21/23    Thank you for your referral.   If you have any questions, please contact FERNANDEZ [facility]

## 2023-07-21 PROBLEM — D61.818 PANCYTOPENIA (HCC): Status: ACTIVE | Noted: 2023-07-21

## 2023-07-21 PROBLEM — D61.818 PANCYTOPENIA (HCC): Status: ACTIVE | Noted: 2023-01-01

## 2023-07-21 NOTE — CM/SW NOTE
CM sent skilled nursing facility referral in [facility] system per family's request.  CM/SW to follow up with patient/family when responses received. SW/CM to remain available for any further discharge planning needs.    Ally Bay RN Case Manager [id number]

## 2023-07-21 NOTE — CM/SW NOTE
Care Conference Attendance: Florence Angel and [facility], Ivana MOSES and Erika MOSES. Pt's spouse, son Bessie Salinas and daughter Tamra Barr. Dr. Ivonne Angel gave update on pt's condition, stating that there are no further treatments to offer pt. Family were given time to ask questions. Dr. Trery Sellers discussed POC for today, speech eval. Family asking about g-tube if pt is aspirating. Dr. Terry Sellers will discuss with GI if pt fails speech eval. However, inform family pt may not be a candidate for g-tube. Family verbalized understanding. Family voiced concerns regarding hastening death if sign up for [facility]. Mery Villagomez spoke to the philosophy of Hospice. Family appreciative. Ivana MOSES will send referrals to NH facilities and will present options to family to review.       Kaylene Bryan, MSN RN, CM

## 2023-07-21 NOTE — CM/SW NOTE
[facility] SW attended care conference this morning to discuss plan moving forward. Family has very clear goals of aggressive care until the patient's last breath. They stated this is their culture and Adventist belief and the patient's personal wishes. Hospice will sign off for the time being but informed family we will always be available. Please re-consult hospice if need arises.     Leslye Caballero, [facility]  [phone]

## 2023-07-21 NOTE — PLAN OF CARE
Pt is A+O 1-3. Orientation waxes and wanes based on patient lethargy at times. VSS on RA. Lungs clear/diminished. Occasionally suctioning patient via Ami Lilliam. Afib on tele. Rates controlled. BUE and BLE edema. Incontinent and briefed. Occasionally awake enough to tolerate feeding via family. Needs encouragement to eat/ take pills. Primofit applied. Skin intact. Turning patient q2h. IV ABX infusing per MAR. Pts family at bedside most of day. Pt and family updated on current POC. All needs met at this time. Problem: Diabetes/Glucose Control  Goal: Glucose maintained within prescribed range  Description: INTERVENTIONS:  - Monitor Blood Glucose as ordered  - Assess for signs and symptoms of hyperglycemia and hypoglycemia  - Administer ordered medications to maintain glucose within target range  - Assess barriers to adequate nutritional intake and initiate nutrition consult as needed  - Instruct patient on self management of diabetes  Outcome: Progressing    Problem: NEUROLOGICAL - ADULT  Goal: Achieves stable or improved neurological status  Description: INTERVENTIONS  - Assess for and report changes in neurological status  - Initiate measures to prevent increased intracranial pressure  - Maintain blood pressure and fluid volume within ordered parameters to optimize cerebral perfusion and minimize risk of hemorrhage  - Monitor temperature, glucose, and sodium.  Initiate appropriate interventions as ordered  Outcome: Progressing  Goal: Achieves maximal functionality and self care  Description: INTERVENTIONS  - Monitor swallowing and airway patency with patient fatigue and changes in neurological status  - Encourage and assist patient to increase activity and self care with guidance from PT/OT  - Encourage visually impaired, hearing impaired and aphasic patients to use assistive/communication devices  Outcome: Progressing

## 2023-07-21 NOTE — PLAN OF CARE
Patient alert and oriented x 1. On room air 98% o2sat maintains. Afib  on cardiac monitor. HR controlled 80-90's , Patient denies any chest pain or chest discomfort at this time. Early shift low grade temp 100.7 ,cold compress care and tylenol administered ,also iv zosyn infusing , temperature came down to normal 98.0 , assisted to feed  snacks on high sitting position and tolerating ,Patient voids via external catheter , shelby tea color clear urin , total 800 during this shift, , last BM 7/20. Skin intact , frequent assisted to turn position ,blood sugar controlled, ivf stopped, pureed diet encouraged and assisted,family  went home late, patient comfortable, no acute distress noted , Plan of care updated, all questions answered. Safety precautions in place. Bed alarm on. Will continue to monitor tele/labs/vital signs closely. Plan:   Care team with family meeting in am.    Problem: NEUROLOGICAL - ADULT  Goal: Achieves stable or improved neurological status  Description: INTERVENTIONS  - Assess for and report changes in neurological status  - Initiate measures to prevent increased intracranial pressure  - Maintain blood pressure and fluid volume within ordered parameters to optimize cerebral perfusion and minimize risk of hemorrhage  - Monitor temperature, glucose, and sodium.  Initiate appropriate interventions as ordered  7/21/2023 0616 by Corinne Lown, RN  Outcome: Progressing  7/21/2023 0616 by Corinne Lown, RN  Outcome: Progressing  Goal: Achieves maximal functionality and self care  Description: INTERVENTIONS  - Monitor swallowing and airway patency with patient fatigue and changes in neurological status  - Encourage and assist patient to increase activity and self care with guidance from PT/OT  - Encourage visually impaired, hearing impaired and aphasic patients to use assistive/communication devices  7/21/2023 0616 by Corinne Lown, RN  Outcome: Progressing  7/21/2023 0616 by Anthony Arizmendi Yuli Perea RN  Outcome: Progressing     Problem: Diabetes/Glucose Control  Goal: Glucose maintained within prescribed range  Description: INTERVENTIONS:  - Monitor Blood Glucose as ordered  - Assess for signs and symptoms of hyperglycemia and hypoglycemia  - Administer ordered medications to maintain glucose within target range  - Assess barriers to adequate nutritional intake and initiate nutrition consult as needed  - Instruct patient on self management of diabetes  7/21/2023 0616 by Aries Lockwood RN  Outcome: Progressing  7/21/2023 0616 by Aries Lockwood RN  Outcome: Progressing

## 2023-07-22 NOTE — PLAN OF CARE
Pt is A+O 1-3. Orientation waxes and wanes based on patient lethargy at times. VSS on RA. Lungs clear/diminished. Occasionally suctioning patient via Bridger Burner. Afib on tele. Rates controlled. BUE and BLE edema. Incontinent and briefed. Occasionally awake enough to tolerate feeding via family. Needs encouragement to eat/ take pills. Primofit applied. Skin intact. Turning patient q2h. IV ABX infusing per MAR. Pts family at bedside most of day. Pt and family updated on current POC. All needs met at this time. Problem: Diabetes/Glucose Control  Goal: Glucose maintained within prescribed range  Description: INTERVENTIONS:  - Monitor Blood Glucose as ordered  - Assess for signs and symptoms of hyperglycemia and hypoglycemia  - Administer ordered medications to maintain glucose within target range  - Assess barriers to adequate nutritional intake and initiate nutrition consult as needed  - Instruct patient on self management of diabetes  Outcome: Progressing     Problem: NEUROLOGICAL - ADULT  Goal: Achieves stable or improved neurological status  Description: INTERVENTIONS  - Assess for and report changes in neurological status  - Initiate measures to prevent increased intracranial pressure  - Maintain blood pressure and fluid volume within ordered parameters to optimize cerebral perfusion and minimize risk of hemorrhage  - Monitor temperature, glucose, and sodium.  Initiate appropriate interventions as ordered  Outcome: Progressing  Goal: Achieves maximal functionality and self care  Description: INTERVENTIONS  - Monitor swallowing and airway patency with patient fatigue and changes in neurological status  - Encourage and assist patient to increase activity and self care with guidance from PT/OT  - Encourage visually impaired, hearing impaired and aphasic patients to use assistive/communication devices  Outcome: Progressing

## 2023-07-22 NOTE — CM/SW NOTE
SW checked SNF referral status on aidin. Extended deadline as there are no accepting SNF providers at this time. Addendum 4:30pm: Extended referral deadline time again as only accepting option is [id number].    MALLORY/JOSUÉ to remain available for support and/or discharge planning.     SONY Gill  Discharge Planner  [phone]

## 2023-07-22 NOTE — PLAN OF CARE
Assumed care of patient at 1. Patient is alert and orientated x1-2. Currently room air. Lung sounds diminished. Continuous pulse ox maintained. Afib on tele. Incontinent of bowel and bladder. External catheter in place. No complaints of pain. Total assist, Q 2 turns. Call light within reach. Fall precautions in place. Plan of care:   IV abx     Problem: Diabetes/Glucose Control  Goal: Glucose maintained within prescribed range  Description: INTERVENTIONS:  - Monitor Blood Glucose as ordered  - Assess for signs and symptoms of hyperglycemia and hypoglycemia  - Administer ordered medications to maintain glucose within target range  - Assess barriers to adequate nutritional intake and initiate nutrition consult as needed  - Instruct patient on self management of diabetes  Outcome: Progressing     Problem: NEUROLOGICAL - ADULT  Goal: Achieves stable or improved neurological status  Description: INTERVENTIONS  - Assess for and report changes in neurological status  - Initiate measures to prevent increased intracranial pressure  - Maintain blood pressure and fluid volume within ordered parameters to optimize cerebral perfusion and minimize risk of hemorrhage  - Monitor temperature, glucose, and sodium.  Initiate appropriate interventions as ordered  Outcome: Progressing  Goal: Achieves maximal functionality and self care  Description: INTERVENTIONS  - Monitor swallowing and airway patency with patient fatigue and changes in neurological status  - Encourage and assist patient to increase activity and self care with guidance from PT/OT  - Encourage visually impaired, hearing impaired and aphasic patients to use assistive/communication devices  Outcome: Progressing

## 2023-07-23 NOTE — PLAN OF CARE
Pt is A+O 1-3. Orientation waxes and wanes based on patient lethargy at times. VSS on RA. Lungs clear/diminished. Occasionally suctioning patient via [place]. Afib on tele. Rates controlled. BUE and BLE edema. Incontinent and briefed. Occasionally awake enough to tolerate feeding via family. Needs encouragement to eat/ take pills. Primofit applied. Skin intact. Turning patient q2h. IV ABX transitioned to orals today. Pts family at bedside most of day. Pt and family updated on current POC. All needs met at this time. Problem: Diabetes/Glucose Control  Goal: Glucose maintained within prescribed range  Description: INTERVENTIONS:  - Monitor Blood Glucose as ordered  - Assess for signs and symptoms of hyperglycemia and hypoglycemia  - Administer ordered medications to maintain glucose within target range  - Assess barriers to adequate nutritional intake and initiate nutrition consult as needed  - Instruct patient on self management of diabetes    Problem: NEUROLOGICAL - ADULT  Goal: Achieves stable or improved neurological status  Description: INTERVENTIONS  - Assess for and report changes in neurological status  - Initiate measures to prevent increased intracranial pressure  - Maintain blood pressure and fluid volume within ordered parameters to optimize cerebral perfusion and minimize risk of hemorrhage  - Monitor temperature, glucose, and sodium.  Initiate appropriate interventions as ordered  Outcome: Progressing  Goal: Achieves maximal functionality and self care  Description: INTERVENTIONS  - Monitor swallowing and airway patency with patient fatigue and changes in neurological status  - Encourage and assist patient to increase activity and self care with guidance from PT/OT  - Encourage visually impaired, hearing impaired and aphasic patients to use assistive/communication devices  Outcome: Progressing

## 2023-07-23 NOTE — PLAN OF CARE
Assumed care of patient at 1. Oriented x1-3, very lethargic at times. On room air with adequate o2 saturations. Afib on tele with rates not sustaining above 120, mainly 100s-110s. Tylenol given for fever. Total care. Q2 turns as able. Bed locked and in lowest position, call light within reach, son at bedside. Plan:  -monitor HR (prn cardizem and metoprolol if parameters met)  -monitor temps  -IV zosyn    Problem: Diabetes/Glucose Control  Goal: Glucose maintained within prescribed range  Description: INTERVENTIONS:  - Monitor Blood Glucose as ordered  - Assess for signs and symptoms of hyperglycemia and hypoglycemia  - Administer ordered medications to maintain glucose within target range  - Assess barriers to adequate nutritional intake and initiate nutrition consult as needed  - Instruct patient on self management of diabetes  Outcome: Progressing       Problem: NEUROLOGICAL - ADULT  Goal: Achieves stable or improved neurological status  Description: INTERVENTIONS  - Assess for and report changes in neurological status  - Initiate measures to prevent increased intracranial pressure  - Maintain blood pressure and fluid volume within ordered parameters to optimize cerebral perfusion and minimize risk of hemorrhage  - Monitor temperature, glucose, and sodium.  Initiate appropriate interventions as ordered  Outcome: Progressing  Goal: Achieves maximal functionality and self care  Description: INTERVENTIONS  - Monitor swallowing and airway patency with patient fatigue and changes in neurological status  - Encourage and assist patient to increase activity and self care with guidance from PT/OT  - Encourage visually impaired, hearing impaired and aphasic patients to use assistive/communication devices  Outcome: Progressing

## 2023-07-24 PROBLEM — J18.9 PNEUMONIA OF RIGHT LUNG DUE TO INFECTIOUS ORGANISM: Status: ACTIVE | Noted: 2023-01-01

## 2023-07-24 PROBLEM — J18.9 PNEUMONIA OF RIGHT LUNG DUE TO INFECTIOUS ORGANISM: Status: ACTIVE | Noted: 2023-07-24

## 2023-07-24 NOTE — SLP NOTE
ADULT SWALLOWING EVALUATION    ASSESSMENT    ASSESSMENT/OVERALL IMPRESSION:  Orders were received for a bedside swallowing evaluation as patient has been coughing with thin liquids. Patient awoke for this writer. He demonstrated weak and reduced oral motor movements. Patient seated upright and midline in bed. Assessed patient with thin liquids, mildly thick liquids and puree via spoon. Patient would only accept small teaspoon presentations. Patient demonstrated decreased retention and decreased labial seal resulting in drooling impaired bolus transit with increased oral transit times and patient occasionally holding the bolus. Pharyngeal phase revealed an apparent timely initiation of pharyngeal phase with apparent weak hyolaryngeal elevation on palpation. Patient with an impaired oral phase and apparent impaired pharyngeal phase with patient at high risk of aspiration due to decreased mental status, generalized weakness and inability to sustain attention. Recommend:  PUREE DIET WITH THIN LIQUIDS WITH CAUTIOUS 1:1 FEEDING at slow rate, small bites and sips, no straws when patient is awake and alert. No further skilled dysphagia is warranted as patient with decreased ability to remain on task. Collaborated with RN who was in agreement. RECOMMENDATIONS   Diet Recommendations - Solids: Puree  Diet Recommendations - Liquids: Thin Liquids      Compensatory Strategies Recommended: No straws; Slow rate;Small bites and sips (Cautious 1:1 feed at a slow rate)  Aspiration Precautions: Upright position; Slow rate; No straw;Small bites and sips  Medication Administration Recommendations: Crushed in puree  Treatment Plan/Recommendations: Aspiration precautions  Discharge Recommendations/Plan: Undetermined    HISTORY   MEDICAL HISTORY  Reason for Referral: R/O aspiration    Problem List  Principal Problem:    Metastasis from malignant tumor of prostate [facility])  Active Problems:    Malignant neoplasm metastatic to brain ([facility]    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)    Cancer with leptomeningeal spread ([facility]    Altered mental status    Pancytopenia ([facility]    Pneumonia of right lung due to infectious organism      Past Medical History  Past Medical History:   Diagnosis Date    Arrhythmia     Diabetes ([facility]     Mitral stenosis 2011    Prostate cancer ([facility] 07/2021       Prior Living Situation: Home with spouse;Home with support  Diet Prior to Admission: Puree; Thin liquids; Nectar thick liquids/ Mildly thick  Precautions: Aspiration    Patient/Family Goals: To eat and drink    SWALLOWING HISTORY  Current Diet Consistency: Puree; Thin liquids  Dysphagia History: Seen by our department on this admit and previous visits with recommendation of puree diet and thin liquids. Imaging Results:  CT of Brain:  CONCLUSION:    Bihemispheric subdural processes measuring up to 0.5 cm in thickness along the right frontal lobe and up to 0.4 cm in thickness along the left frontal lobe. These may represent subdural hemorrhage or possibly leptomeningeal metastatic disease given   clinical history of malignancy. This would be best assessed with MRI. No associated sulcal effacement, regional mass effect, herniation, or hydrocephalus. SUBJECTIVE       OBJECTIVE   ORAL MOTOR EXAMINATION  Dentition: Functional (missing some teeth.)  Symmetry: Within Functional Limits  Strength: Unable to assess  Tone: Unable to assess  Range of Motion: Unable to assess (minimal movement)  Rate of Motion: Reduced    Voice Quality: Weak (periods of aphonia)  Respiratory Status: Unlabored  Consistencies Trialed: Thin liquids; Nectar thick liquids;Puree  Method of Presentation: Staff/Clinician assistance;Spoon;Single sips  Patient Positioning: Upright;Midline (in bed)    Oral Phase of Swallow: Impaired  Bolus Retrieval: Impaired  Bilabial Seal: Impaired  Bolus Formation: Impaired  Bolus Propulsion: Impaired Retention: Impaired    Pharyngeal Phase of Swallow: Impaired  Laryngeal Elevation Timing: Appears intact  Laryngeal Elevation Strength: Appears impaired  Laryngeal Elevation Coordination: Appears intact  (Please note: Silent aspiration cannot be evaluated clinically.  Videofluoroscopic Swallow Study is required to rule-out silent aspiration.)    Esophageal Phase of Swallow: No complaints consistent with possible esophageal involvement  Comments:               FOLLOW UP  Treatment Plan/Recommendations: Aspiration precautions  Follow Up Needed (Documentation Required): No      Thank you for your referral.   If you have any questions, please contact FERNANDEZ Antonio

## 2023-07-24 NOTE — PLAN OF CARE
Assumed pt care at 0730. Pt intermittently alert, room air, pt does not appear to be in any pain, lung sounds diminished, vital signs stable, Afib on tele. Incontinent of bowel and bladder, primofit in place. Q2 turning. Pt and family at bedside requesting straws for drinking, family educated on not using straws d/t aspiration precautions per SLP recommendations. Plan of care: monitor fevers, wait for blood cultures, dc back to [facility] updated with patient and family. Questions answered, pt verbalized understanding. Call light is within reach, will continue to monitor. Problem: Diabetes/Glucose Control  Goal: Glucose maintained within prescribed range  Description: INTERVENTIONS:  - Monitor Blood Glucose as ordered  - Assess for signs and symptoms of hyperglycemia and hypoglycemia  - Administer ordered medications to maintain glucose within target range  - Assess barriers to adequate nutritional intake and initiate nutrition consult as needed  - Instruct patient on self management of diabetes  Outcome: Progressing       Problem: NEUROLOGICAL - ADULT  Goal: Achieves stable or improved neurological status  Description: INTERVENTIONS  - Assess for and report changes in neurological status  - Initiate measures to prevent increased intracranial pressure  - Maintain blood pressure and fluid volume within ordered parameters to optimize cerebral perfusion and minimize risk of hemorrhage  - Monitor temperature, glucose, and sodium.  Initiate appropriate interventions as ordered  Outcome: Progressing  Goal: Achieves maximal functionality and self care  Description: INTERVENTIONS  - Monitor swallowing and airway patency with patient fatigue and changes in neurological status  - Encourage and assist patient to increase activity and self care with guidance from PT/OT  - Encourage visually impaired, hearing impaired and aphasic patients to use assistive/communication devices  Outcome: Progressing

## 2023-07-24 NOTE — CM/SW NOTE
The only available SNF provider at this time is [facility]. Extended SNF referrals in [facility] as well.      LEA Chauhan, [facility]  Discharge Planner  [id number]

## 2023-07-24 NOTE — PLAN OF CARE
Assumed care of patient at 1. Pt is very lethargic. On room air with adequate O2 saturations, does not appear dyspneic. Afib on tele, rates controlled. Incontinent, primofit in place. Turning as able. Pt is a total care. Needs met at this time, bed locked and in lowest position. Family not at bedside tonight. Not safe for PO medication administration, Dr. Edmonds Friend notified of this and CXR results, PO abx switched back to IV. Plan:  -blood cultures  -IV zosyn      Problem: Diabetes/Glucose Control  Goal: Glucose maintained within prescribed range  Description: INTERVENTIONS:  - Monitor Blood Glucose as ordered  - Assess for signs and symptoms of hyperglycemia and hypoglycemia  - Administer ordered medications to maintain glucose within target range  - Assess barriers to adequate nutritional intake and initiate nutrition consult as needed  - Instruct patient on self management of diabetes  Outcome: Progressing    Problem: NEUROLOGICAL - ADULT  Goal: Achieves stable or improved neurological status  Description: INTERVENTIONS  - Assess for and report changes in neurological status  - Initiate measures to prevent increased intracranial pressure  - Maintain blood pressure and fluid volume within ordered parameters to optimize cerebral perfusion and minimize risk of hemorrhage  - Monitor temperature, glucose, and sodium.  Initiate appropriate interventions as ordered  Outcome: Progressing  Goal: Achieves maximal functionality and self care  Description: INTERVENTIONS  - Monitor swallowing and airway patency with patient fatigue and changes in neurological status  - Encourage and assist patient to increase activity and self care with guidance from PT/OT  - Encourage visually impaired, hearing impaired and aphasic patients to use assistive/communication devices  Outcome: Progressing     Outcome: Progressing

## 2023-07-24 NOTE — CM/SW NOTE
MALLORY spoke with son, Lilibeth Flores, over the phone regarding discharge planning. Informed son that the only available provider for SNF is [facility]. Son agreeable to returning to [facility] at discharge. Reserved facility in [address] [place] [address]. Message sent to Hospitalist to inform. ST to see pt today. Await discharge clearance.      Martínez Andrews, LEA, [facility]  Discharge Planner  [id number]

## 2023-07-25 NOTE — PLAN OF CARE
Problem: Diabetes/Glucose Control  Goal: Glucose maintained within prescribed range  Description: INTERVENTIONS:  - Monitor Blood Glucose as ordered  - Assess for signs and symptoms of hyperglycemia and hypoglycemia  - Administer ordered medications to maintain glucose within target range  - Assess barriers to adequate nutritional intake and initiate nutrition consult as needed  - Instruct patient on self management of diabetes  7/24/2023 2251 by Val Nelson RN  Outcome: Progressing  7/24/2023 2251 by Val Nelson RN  Outcome: Progressing     Problem: Patient/Family Goals  Goal: Patient/Family Long Term Goal  Description: Patient's Long Term Goal: able to go home     Interventions  - See additional Care Plan goals for specific interventions  7/24/2023 2251 by Val Nelson RN  Outcome: Progressing  7/24/2023 2251 by Val Nelson RN  Outcome: Progressing  Goal: Patient/Family Short Term Goal  Description: Patient's Short Term Goal:able to eat    Interventions:   Speech/swallow eval, aspiration precaution  - See additional Care Plan goals for specific interventions  7/24/2023 2251 by Val Nelson RN  Outcome: Progressing  7/24/2023 2251 by Val Nelson RN  Outcome: Progressing     Problem: CARDIOVASCULAR - ADULT  Goal: Maintains optimal cardiac output and hemodynamic stability  Description: INTERVENTIONS:  - Monitor vital signs, rhythm, and trends  - Monitor for bleeding, hypotension and signs of decreased cardiac output  - Evaluate effectiveness of vasoactive medications to optimize hemodynamic stability  - Monitor arterial and/or venous puncture sites for bleeding and/or hematoma  - Assess quality of pulses, skin color and temperature  - Assess for signs of decreased coronary artery perfusion - ex.  Angina  - Evaluate fluid balance, assess for edema, trend weights  7/24/2023 2251 by Val Nelson RN  Outcome: Progressing  7/24/2023 2251 by Val Nelson RN  Outcome: Progressing     Problem: NEUROLOGICAL - ADULT  Goal: Achieves stable or improved neurological status  Description: INTERVENTIONS  - Assess for and report changes in neurological status  - Initiate measures to prevent increased intracranial pressure  - Maintain blood pressure and fluid volume within ordered parameters to optimize cerebral perfusion and minimize risk of hemorrhage  - Monitor temperature, glucose, and sodium.  Initiate appropriate interventions as ordered  7/24/2023 2251 by Iris Gardner RN  Outcome: Progressing  7/24/2023 2251 by Iris Gardner RN  Outcome: Progressing  Goal: Achieves maximal functionality and self care  Description: INTERVENTIONS  - Monitor swallowing and airway patency with patient fatigue and changes in neurological status  - Encourage and assist patient to increase activity and self care with guidance from PT/OT  - Encourage visually impaired, hearing impaired and aphasic patients to use assistive/communication devices  7/24/2023 2251 by Iris Gardner RN  Outcome: Progressing  7/24/2023 2251 by Iris Gardner RN  Outcome: Progressing     Problem: GASTROINTESTINAL - ADULT  Goal: Maintains adequate nutritional intake (undernourished)  Description: INTERVENTIONS:  - Monitor percentage of each meal consumed  - Identify factors contributing to decreased intake, treat as appropriate  - Assist with meals as needed  - Monitor I&O, WT and lab values  - Obtain nutritional consult as needed  - Optimize oral hygiene and moisture  - Encourage food from home; allow for food preferences  - Enhance eating environment  7/24/2023 2251 by Iris Gardner RN  Outcome: Progressing  7/24/2023 2251 by Iris Gardner RN  Outcome: Progressing

## 2023-07-25 NOTE — PLAN OF CARE
Received pt at 0730. A/Ox1, alert intermittently, with a language barrier. Pt does not express pain. Lung sounds diminished bilaterally. Afib on monitor. Incontinent to bowel and bladder, primofit in place. Pt family member at bedside, spent night. Was sleeping during assessment and med pass. Pt unable to tolerate oral medications, held them due to fear of aspiration. Plan to wait for family member to wake up so I can ask simple questions in Turkmen to reassess orientation.      46 - family is concerned about pt aspirating and wishes no PO meds given    Problem: Diabetes/Glucose Control  Goal: Glucose maintained within prescribed range  Description: INTERVENTIONS:  - Monitor Blood Glucose as ordered  - Assess for signs and symptoms of hyperglycemia and hypoglycemia  - Administer ordered medications to maintain glucose within target range  - Assess barriers to adequate nutritional intake and initiate nutrition consult as needed  - Instruct patient on self management of diabetes  7/25/2023 1114 by Contreras Montague RN  Outcome: Progressing  7/25/2023 1114 by Contreras Montague RN  Outcome: Progressing     Problem: Patient/Family Goals  Goal: Patient/Family Long Term Goal  Description: Patient's Long Term Goal: go home    Interventions:  - MD to see, medications  - See additional Care Plan goals for specific interventions  7/25/2023 1114 by Contreras Montague RN  Outcome: Progressing  7/25/2023 1114 by Contreras Montague RN  Outcome: Progressing  Goal: Patient/Family Short Term Goal  Description: Patient's Short Term Goal: stable vital signs    Interventions:   - medications  - See additional Care Plan goals for specific interventions  7/25/2023 1114 by Contreras Montague RN  Outcome: Progressing  7/25/2023 1114 by Contreras Montague RN  Outcome: Progressing     Problem: CARDIOVASCULAR - ADULT  Goal: Maintains optimal cardiac output and hemodynamic stability  Description: INTERVENTIONS:  - Monitor vital signs, rhythm, and trends  - Monitor for bleeding, hypotension and signs of decreased cardiac output  - Evaluate effectiveness of vasoactive medications to optimize hemodynamic stability  - Monitor arterial and/or venous puncture sites for bleeding and/or hematoma  - Assess quality of pulses, skin color and temperature  - Assess for signs of decreased coronary artery perfusion - ex. Angina  - Evaluate fluid balance, assess for edema, trend weights  7/25/2023 1114 by Contreras Montague RN  Outcome: Progressing  7/25/2023 1114 by Contreras Montague RN  Outcome: Progressing  Goal: Absence of cardiac arrhythmias or at baseline  Description: INTERVENTIONS:  - Continuous cardiac monitoring, monitor vital signs, obtain 12 lead EKG if indicated  - Evaluate effectiveness of antiarrhythmic and heart rate control medications as ordered  - Initiate emergency measures for life threatening arrhythmias  - Monitor electrolytes and administer replacement therapy as ordered  7/25/2023 1114 by Contreras Montague RN  Outcome: Progressing  7/25/2023 1114 by Contreras Montague RN  Outcome: Progressing     Problem: NEUROLOGICAL - ADULT  Goal: Achieves stable or improved neurological status  Description: INTERVENTIONS  - Assess for and report changes in neurological status  - Initiate measures to prevent increased intracranial pressure  - Maintain blood pressure and fluid volume within ordered parameters to optimize cerebral perfusion and minimize risk of hemorrhage  - Monitor temperature, glucose, and sodium.  Initiate appropriate interventions as ordered  7/25/2023 1114 by Contreras Montague RN  Outcome: Progressing  7/25/2023 1114 by Contreras Montague RN  Outcome: Progressing  Goal: Achieves maximal functionality and self care  Description: INTERVENTIONS  - Monitor swallowing and airway patency with patient fatigue and changes in neurological status  - Encourage and assist patient to increase activity and self care with guidance from PT/OT  - Encourage visually impaired, hearing impaired and aphasic patients to use assistive/communication devices  7/25/2023 1114 by Sujit Israel RN  Outcome: Progressing  7/25/2023 1114 by Sujit Israel RN  Outcome: Progressing     Problem: GASTROINTESTINAL - ADULT  Goal: Maintains adequate nutritional intake (undernourished)  Description: INTERVENTIONS:  - Monitor percentage of each meal consumed  - Identify factors contributing to decreased intake, treat as appropriate  - Assist with meals as needed  - Monitor I&O, WT and lab values  - Obtain nutritional consult as needed  - Optimize oral hygiene and moisture  - Encourage food from home; allow for food preferences  - Enhance eating environment  7/25/2023 1114 by Sujit Israel RN  Outcome: Progressing  7/25/2023 1114 by Sujit Israel RN  Outcome: Progressing     Problem: METABOLIC/FLUID AND ELECTROLYTES - ADULT  Goal: Electrolytes maintained within normal limits  Description: INTERVENTIONS:  - Monitor labs and rhythm and assess patient for signs and symptoms of electrolyte imbalances  - Administer electrolyte replacement as ordered  - Monitor response to electrolyte replacements, including rhythm and repeat lab results as appropriate  - Fluid restriction as ordered  - Instruct patient on fluid and nutrition restrictions as appropriate  7/25/2023 1114 by Sujit Israel RN  Outcome: Progressing  7/25/2023 1114 by Sujit Israel RN  Outcome: Progressing     Problem: Impaired Swallowing  Goal: Minimize aspiration risk  Description: Interventions:  - Patient should be alert and upright for all feedings (90 degrees preferred)  - Offer food and liquids at a slow rate  - No straws  - Encourage small bites of food and small sips of liquid  - Offer pills one at a time, crush or deliver with applesauce as needed  - Discontinue feeding and notify MD (or speech pathologist) if coughing or persistent throat clearing or wet/gurgly vocal quality is noted  7/25/2023 1114 by Oma Rafy Bryant RN  Outcome: Progressing  7/25/2023 1114 by Laly Clarke RN  Outcome: Progressing     Problem: Impaired Cognition  Goal: Patient will exhibit improved attention, thought processing and/or memory  Description: Interventions:    7/25/2023 1114 by Laly Clarke RN  Outcome: Progressing  7/25/2023 1114 by Laly Calrke RN  Outcome: Progressing

## 2023-07-26 NOTE — PLAN OF CARE
Received pt at 0700. A/Ox3. Pt is aware of person, place, not time. He will respond to questions in [place]ian, but is not understandable. However, he is aware of his wants, as he will point and respond to questions appropriately. He is intermittently confused. A-fib on monitor, room air. Lung sounds diminished bilaterally, some congestion/rhonchi noted. Incontinent to bladder and bowel. Pt still unable to tolerate oral medications or food. Vomited the few bites I gave him, small amount, immeasurable. Family updated with plan of care. 1459 - Mepilex placed on sacrum due to bedfast state.      Problem: Diabetes/Glucose Control  Goal: Glucose maintained within prescribed range  Description: INTERVENTIONS:  - Monitor Blood Glucose as ordered  - Assess for signs and symptoms of hyperglycemia and hypoglycemia  - Administer ordered medications to maintain glucose within target range  - Assess barriers to adequate nutritional intake and initiate nutrition consult as needed  - Instruct patient on self management of diabetes  Outcome: Progressing     Problem: Patient/Family Goals  Goal: Patient/Family Long Term Goal  Description: Patient's Long Term Goal: go home    Interventions:  - MD to see, medications  - See additional Care Plan goals for specific interventions  Outcome: Progressing  Goal: Patient/Family Short Term Goal  Description: Patient's Short Term Goal: stable vital signs    Interventions:   - medications  - See additional Care Plan goals for specific interventions  Outcome: Progressing     Problem: CARDIOVASCULAR - ADULT  Goal: Maintains optimal cardiac output and hemodynamic stability  Description: INTERVENTIONS:  - Monitor vital signs, rhythm, and trends  - Monitor for bleeding, hypotension and signs of decreased cardiac output  - Evaluate effectiveness of vasoactive medications to optimize hemodynamic stability  - Monitor arterial and/or venous puncture sites for bleeding and/or hematoma  - Assess quality of pulses, skin color and temperature  - Assess for signs of decreased coronary artery perfusion - ex. Angina  - Evaluate fluid balance, assess for edema, trend weights  Outcome: Progressing  Goal: Absence of cardiac arrhythmias or at baseline  Description: INTERVENTIONS:  - Continuous cardiac monitoring, monitor vital signs, obtain 12 lead EKG if indicated  - Evaluate effectiveness of antiarrhythmic and heart rate control medications as ordered  - Initiate emergency measures for life threatening arrhythmias  - Monitor electrolytes and administer replacement therapy as ordered  Outcome: Progressing     Problem: NEUROLOGICAL - ADULT  Goal: Achieves stable or improved neurological status  Description: INTERVENTIONS  - Assess for and report changes in neurological status  - Initiate measures to prevent increased intracranial pressure  - Maintain blood pressure and fluid volume within ordered parameters to optimize cerebral perfusion and minimize risk of hemorrhage  - Monitor temperature, glucose, and sodium.  Initiate appropriate interventions as ordered  Outcome: Progressing  Goal: Achieves maximal functionality and self care  Description: INTERVENTIONS  - Monitor swallowing and airway patency with patient fatigue and changes in neurological status  - Encourage and assist patient to increase activity and self care with guidance from PT/OT  - Encourage visually impaired, hearing impaired and aphasic patients to use assistive/communication devices  Outcome: Progressing     Problem: GASTROINTESTINAL - ADULT  Goal: Maintains adequate nutritional intake (undernourished)  Description: INTERVENTIONS:  - Monitor percentage of each meal consumed  - Identify factors contributing to decreased intake, treat as appropriate  - Assist with meals as needed  - Monitor I&O, WT and lab values  - Obtain nutritional consult as needed  - Optimize oral hygiene and moisture  - Encourage food from home; allow for food preferences  - Enhance eating environment  Outcome: Progressing     Problem: METABOLIC/FLUID AND ELECTROLYTES - ADULT  Goal: Electrolytes maintained within normal limits  Description: INTERVENTIONS:  - Monitor labs and rhythm and assess patient for signs and symptoms of electrolyte imbalances  - Administer electrolyte replacement as ordered  - Monitor response to electrolyte replacements, including rhythm and repeat lab results as appropriate  - Fluid restriction as ordered  - Instruct patient on fluid and nutrition restrictions as appropriate  Outcome: Progressing     Problem: Impaired Swallowing  Goal: Minimize aspiration risk  Description: Interventions:  - Patient should be alert and upright for all feedings (90 degrees preferred)  - Offer food and liquids at a slow rate  - No straws  - Encourage small bites of food and small sips of liquid  - Offer pills one at a time, crush or deliver with applesauce as needed  - Discontinue feeding and notify MD (or speech pathologist) if coughing or persistent throat clearing or wet/gurgly vocal quality is noted  Outcome: Progressing     Problem: Impaired Cognition  Goal: Patient will exhibit improved attention, thought processing and/or memory  Description: Interventions:    Outcome: Progressing

## 2023-07-26 NOTE — PLAN OF CARE
Assumed care of pt at 1930  Pt is somnolent w/ minimal response to stimuli. Pt tolerated care given fairly well. Bedrest w/ q2 turns implemented. Room air, Afib on tele. HR 90s-100s. No cardiac symptoms. Pt incontinent of bladder and bowel. Primofit in place. Last BM 7/25. Legs elevated with pillow. IV abx running per orders. Bed locked and in lowest position w/ bed alarm on. Call light in reach. Pt updated on plan of care. Problem: Diabetes/Glucose Control  Goal: Glucose maintained within prescribed range  Description: INTERVENTIONS:  - Monitor Blood Glucose as ordered  - Assess for signs and symptoms of hyperglycemia and hypoglycemia  - Administer ordered medications to maintain glucose within target range  - Assess barriers to adequate nutritional intake and initiate nutrition consult as needed  - Instruct patient on self management of diabetes  Outcome: Progressing     Problem: Patient/Family Goals  Goal: Patient/Family Long Term Goal  Description: Patient's Long Term Goal: go home    Interventions:  - MD to see, medications  - See additional Care Plan goals for specific interventions  Outcome: Progressing  Goal: Patient/Family Short Term Goal  Description: Patient's Short Term Goal: stable vital signs    Interventions:   - medications  - See additional Care Plan goals for specific interventions  Outcome: Progressing     Problem: CARDIOVASCULAR - ADULT  Goal: Maintains optimal cardiac output and hemodynamic stability  Description: INTERVENTIONS:  - Monitor vital signs, rhythm, and trends  - Monitor for bleeding, hypotension and signs of decreased cardiac output  - Evaluate effectiveness of vasoactive medications to optimize hemodynamic stability  - Monitor arterial and/or venous puncture sites for bleeding and/or hematoma  - Assess quality of pulses, skin color and temperature  - Assess for signs of decreased coronary artery perfusion - ex.  Angina  - Evaluate fluid balance, assess for edema, trend weights  Outcome: Progressing  Goal: Absence of cardiac arrhythmias or at baseline  Description: INTERVENTIONS:  - Continuous cardiac monitoring, monitor vital signs, obtain 12 lead EKG if indicated  - Evaluate effectiveness of antiarrhythmic and heart rate control medications as ordered  - Initiate emergency measures for life threatening arrhythmias  - Monitor electrolytes and administer replacement therapy as ordered  Outcome: Progressing     Problem: METABOLIC/FLUID AND ELECTROLYTES - ADULT  Goal: Electrolytes maintained within normal limits  Description: INTERVENTIONS:  - Monitor labs and rhythm and assess patient for signs and symptoms of electrolyte imbalances  - Administer electrolyte replacement as ordered  - Monitor response to electrolyte replacements, including rhythm and repeat lab results as appropriate  - Fluid restriction as ordered  - Instruct patient on fluid and nutrition restrictions as appropriate  Outcome: Progressing     Problem: Impaired Swallowing  Goal: Minimize aspiration risk  Description: Interventions:  - Patient should be alert and upright for all feedings (90 degrees preferred)  - Offer food and liquids at a slow rate  - No straws  - Encourage small bites of food and small sips of liquid  - Offer pills one at a time, crush or deliver with applesauce as needed  - Discontinue feeding and notify MD (or speech pathologist) if coughing or persistent throat clearing or wet/gurgly vocal quality is noted  Outcome: Progressing

## 2023-07-27 NOTE — PLAN OF CARE
Assumed care for patient at 0700  Alert, incomprehensible speech. Generalized weakness. Passive ROM with extremities. Dependent care. Max assist with repositioning q2h. Afib on cardiac monitor, HR sustaining low 100's. Increased secretions with frequent suctioning. Unrelieved with oral Yankauer suction. Respiratory contacted for flexible suction. Notified Hospitalist of increased secretions. Adequate saturation on RA. Asked patient if he had pain, patient nodded yes. Pt unable to specify nature and location of pain. Morphine prn dose given. Incontinent. Primo fit in place for urine suction. LBM today. Notified hospitalist that patient not tolerating PO intake due to signs of aspiration and suction overnight. With a sip of water and juice pt holds fluid in his mouth and coughing. Coughs with mouth swabs as well. Speech therapy paged 3 times, asked about video swallow. Pt spiked a temp 100.6, diaphoretic. Tylenol suppository given. Problem: Diabetes/Glucose Control  Goal: Glucose maintained within prescribed range  Description: INTERVENTIONS:  - Monitor Blood Glucose as ordered  - Assess for signs and symptoms of hyperglycemia and hypoglycemia  - Administer ordered medications to maintain glucose within target range  - Assess barriers to adequate nutritional intake and initiate nutrition consult as needed  - Instruct patient on self management of diabetes  Outcome: Progressing     Problem: CARDIOVASCULAR - ADULT  Goal: Maintains optimal cardiac output and hemodynamic stability  Description: INTERVENTIONS:  - Monitor vital signs, rhythm, and trends  - Monitor for bleeding, hypotension and signs of decreased cardiac output  - Evaluate effectiveness of vasoactive medications to optimize hemodynamic stability  - Monitor arterial and/or venous puncture sites for bleeding and/or hematoma  - Assess quality of pulses, skin color and temperature  - Assess for signs of decreased coronary artery perfusion - ex. Angina  - Evaluate fluid balance, assess for edema, trend weights  Outcome: Progressing  Goal: Absence of cardiac arrhythmias or at baseline  Description: INTERVENTIONS:  - Continuous cardiac monitoring, monitor vital signs, obtain 12 lead EKG if indicated  - Evaluate effectiveness of antiarrhythmic and heart rate control medications as ordered  - Initiate emergency measures for life threatening arrhythmias  - Monitor electrolytes and administer replacement therapy as ordered  Outcome: Progressing     Problem: NEUROLOGICAL - ADULT  Goal: Achieves stable or improved neurological status  Description: INTERVENTIONS  - Assess for and report changes in neurological status  - Initiate measures to prevent increased intracranial pressure  - Maintain blood pressure and fluid volume within ordered parameters to optimize cerebral perfusion and minimize risk of hemorrhage  - Monitor temperature, glucose, and sodium.  Initiate appropriate interventions as ordered  Outcome: Progressing  Goal: Achieves maximal functionality and self care  Description: INTERVENTIONS  - Monitor swallowing and airway patency with patient fatigue and changes in neurological status  - Encourage and assist patient to increase activity and self care with guidance from PT/OT  - Encourage visually impaired, hearing impaired and aphasic patients to use assistive/communication devices  Outcome: Progressing     Problem: GASTROINTESTINAL - ADULT  Goal: Maintains adequate nutritional intake (undernourished)  Description: INTERVENTIONS:  - Monitor percentage of each meal consumed  - Identify factors contributing to decreased intake, treat as appropriate  - Assist with meals as needed  - Monitor I&O, WT and lab values  - Obtain nutritional consult as needed  - Optimize oral hygiene and moisture  - Encourage food from home; allow for food preferences  - Enhance eating environment  Outcome: Progressing     Problem: METABOLIC/FLUID AND ELECTROLYTES - ADULT  Goal: Electrolytes maintained within normal limits  Description: INTERVENTIONS:  - Monitor labs and rhythm and assess patient for signs and symptoms of electrolyte imbalances  - Administer electrolyte replacement as ordered  - Monitor response to electrolyte replacements, including rhythm and repeat lab results as appropriate  - Fluid restriction as ordered  - Instruct patient on fluid and nutrition restrictions as appropriate  Outcome: Progressing     Problem: Impaired Swallowing  Goal: Minimize aspiration risk  Description: Interventions:  - Patient should be alert and upright for all feedings (90 degrees preferred)  - Offer food and liquids at a slow rate  - No straws  - Encourage small bites of food and small sips of liquid  - Offer pills one at a time, crush or deliver with applesauce as needed  - Discontinue feeding and notify MD (or speech pathologist) if coughing or persistent throat clearing or wet/gurgly vocal quality is noted  Outcome: Progressing     Problem: Impaired Cognition  Goal: Patient will exhibit improved attention, thought processing and/or memory  Description: Interventions:  - Minimize distractions in the room when full attention is required  - Allow additional time for processing after asking questions or providing instructions  Problem: Patient/Family Goals  Goal: Patient/Family Long Term Goal  Description: Patient's Long Term Goal: go home    Interventions:  - MD to see, medications  - See additional Care Plan goals for specific interventions  Outcome: Progressing  Goal: Patient/Family Short Term Goal  Description: Patient's Short Term Goal: stable vital signs    Interventions:   - increase PO intake-discuss nutrition goals. Speech therapy for video swallow.  IR for possible PEG tube  - See additional Care Plan goals for specific interventions  Outcome: Progressing     Outcome: Progressing

## 2023-07-27 NOTE — PLAN OF CARE
Assumed care of patient at 1. Pt lethargic, family at bedside. O2 sats maintained on room air, Pt has congested cough, PRN suctioning. Afib on tele. Last BM 7/26. Incontinent, primofit in place. Pt reports no pain. Bedrest, very weak. Pt updated on plan of care. Care needs met. Bed in lowest position, Call light within reach. Bed alarm on. POC: IV antibiotics    Problem: Diabetes/Glucose Control  Goal: Glucose maintained within prescribed range  Description: INTERVENTIONS:  - Monitor Blood Glucose as ordered  - Assess for signs and symptoms of hyperglycemia and hypoglycemia  - Administer ordered medications to maintain glucose within target range  - Assess barriers to adequate nutritional intake and initiate nutrition consult as needed  - Instruct patient on self management of diabetes  Outcome: Progressing     Problem: Patient/Family Goals  Goal: Patient/Family Long Term Goal  Description: Patient's Long Term Goal: go home    Interventions:  - MD to see, medications  - See additional Care Plan goals for specific interventions  Outcome: Progressing  Goal: Patient/Family Short Term Goal  Description: Patient's Short Term Goal: stable vital signs    Interventions:   - medications  - See additional Care Plan goals for specific interventions  Outcome: Progressing     Problem: CARDIOVASCULAR - ADULT  Goal: Maintains optimal cardiac output and hemodynamic stability  Description: INTERVENTIONS:  - Monitor vital signs, rhythm, and trends  - Monitor for bleeding, hypotension and signs of decreased cardiac output  - Evaluate effectiveness of vasoactive medications to optimize hemodynamic stability  - Monitor arterial and/or venous puncture sites for bleeding and/or hematoma  - Assess quality of pulses, skin color and temperature  - Assess for signs of decreased coronary artery perfusion - ex.  Angina  - Evaluate fluid balance, assess for edema, trend weights  Outcome: Progressing  Goal: Absence of cardiac arrhythmias or at baseline  Description: INTERVENTIONS:  - Continuous cardiac monitoring, monitor vital signs, obtain 12 lead EKG if indicated  - Evaluate effectiveness of antiarrhythmic and heart rate control medications as ordered  - Initiate emergency measures for life threatening arrhythmias  - Monitor electrolytes and administer replacement therapy as ordered  Outcome: Progressing     Problem: NEUROLOGICAL - ADULT  Goal: Achieves stable or improved neurological status  Description: INTERVENTIONS  - Assess for and report changes in neurological status  - Initiate measures to prevent increased intracranial pressure  - Maintain blood pressure and fluid volume within ordered parameters to optimize cerebral perfusion and minimize risk of hemorrhage  - Monitor temperature, glucose, and sodium.  Initiate appropriate interventions as ordered  Outcome: Progressing  Goal: Achieves maximal functionality and self care  Description: INTERVENTIONS  - Monitor swallowing and airway patency with patient fatigue and changes in neurological status  - Encourage and assist patient to increase activity and self care with guidance from PT/OT  - Encourage visually impaired, hearing impaired and aphasic patients to use assistive/communication devices  Outcome: Progressing     Problem: GASTROINTESTINAL - ADULT  Goal: Maintains adequate nutritional intake (undernourished)  Description: INTERVENTIONS:  - Monitor percentage of each meal consumed  - Identify factors contributing to decreased intake, treat as appropriate  - Assist with meals as needed  - Monitor I&O, WT and lab values  - Obtain nutritional consult as needed  - Optimize oral hygiene and moisture  - Encourage food from home; allow for food preferences  - Enhance eating environment  Outcome: Progressing     Problem: METABOLIC/FLUID AND ELECTROLYTES - ADULT  Goal: Electrolytes maintained within normal limits  Description: INTERVENTIONS:  - Monitor labs and rhythm and assess patient for signs and symptoms of electrolyte imbalances  - Administer electrolyte replacement as ordered  - Monitor response to electrolyte replacements, including rhythm and repeat lab results as appropriate  - Fluid restriction as ordered  - Instruct patient on fluid and nutrition restrictions as appropriate  Outcome: Progressing     Problem: Impaired Swallowing  Goal: Minimize aspiration risk  Description: Interventions:  - Patient should be alert and upright for all feedings (90 degrees preferred)  - Offer food and liquids at a slow rate  - No straws  - Encourage small bites of food and small sips of liquid  - Offer pills one at a time, crush or deliver with applesauce as needed  - Discontinue feeding and notify MD (or speech pathologist) if coughing or persistent throat clearing or wet/gurgly vocal quality is noted  Outcome: Progressing     Problem: Impaired Cognition  Goal: Patient will exhibit improved attention, thought processing and/or memory    Outcome: Progressing

## 2023-07-27 NOTE — CM/SW NOTE
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 Oncology continues to discuss [address] with pt's family. Hospice attended care conference on 7/21 and pt's family are not interested in Hospice care. Pt remains full code.   -FTT. Pt with poor po intake and dyspahgia. Noted to have episode of aspiration w/ PO meds today. GI evaluated pt for G-tube on 7/21. At that time, IR placement vs. IV nutrition were their recommendation based on pt's metastatic disease and lymphadenopathy. IR placement of G-tube ordered this am.     Addendum:  1400- IR contacted re: PEG placement. They will not be able to place PEG until 7/28. Discharge Barriers: G-tube placement. Medical clearance   Expected discharge date: 1-2 days   [facility] [place] [address] [facility]. / available for discharge planning.      CHANNING Cortez, VIA [facility]    [id number]

## 2023-07-27 NOTE — CM/SW NOTE
MALLORY sent updates to [facility] in [place].      LEA Medrano, [facility]  Discharge Planner  [id number]

## 2023-07-28 NOTE — PLAN OF CARE
Assumed care of pt at [id number] [place] [address],[address], unable to communicate with , requires total assist, appears restless and uncomfortable at times, pain relieved by PRN morphine  R/A, A fib with controlled rates  Secretions greatly improved with scopolamine patch, still requiring occasional suctioning with [place]  Skin is clean, dry, and intact, no wounds present  Incontinent of bowel and bladder, last BM 7/27, voiding per external suction catheter  Fall precautions in place, bed and chair alarm on, call light within reach, pt and family updated on plan of care, will continue to monitor                        Problem: Diabetes/Glucose Control  Goal: Glucose maintained within prescribed range  Description: INTERVENTIONS:  - Monitor Blood Glucose as ordered  - Assess for signs and symptoms of hyperglycemia and hypoglycemia  - Administer ordered medications to maintain glucose within target range  - Assess barriers to adequate nutritional intake and initiate nutrition consult as needed  - Instruct patient on self management of diabetes  Outcome: Progressing     Problem: Patient/Family Goals  Goal: Patient/Family Long Term Goal  Description: Patient's Long Term Goal: go home    Interventions:  - MD to see, medications  - See additional Care Plan goals for specific interventions  Outcome: Progressing  Goal: Patient/Family Short Term Goal  Description: Patient's Short Term Goal: stable vital signs    Interventions:   - medications  - See additional Care Plan goals for specific interventions  Outcome: Progressing     Problem: CARDIOVASCULAR - ADULT  Goal: Maintains optimal cardiac output and hemodynamic stability  Description: INTERVENTIONS:  - Monitor vital signs, rhythm, and trends  - Monitor for bleeding, hypotension and signs of decreased cardiac output  - Evaluate effectiveness of vasoactive medications to optimize hemodynamic stability  - Monitor arterial and/or venous puncture sites for bleeding and/or hematoma  - Assess quality of pulses, skin color and temperature  - Assess for signs of decreased coronary artery perfusion - ex. Angina  - Evaluate fluid balance, assess for edema, trend weights  Outcome: Progressing  Goal: Absence of cardiac arrhythmias or at baseline  Description: INTERVENTIONS:  - Continuous cardiac monitoring, monitor vital signs, obtain 12 lead EKG if indicated  - Evaluate effectiveness of antiarrhythmic and heart rate control medications as ordered  - Initiate emergency measures for life threatening arrhythmias  - Monitor electrolytes and administer replacement therapy as ordered  Outcome: Progressing     Problem: NEUROLOGICAL - ADULT  Goal: Achieves stable or improved neurological status  Description: INTERVENTIONS  - Assess for and report changes in neurological status  - Initiate measures to prevent increased intracranial pressure  - Maintain blood pressure and fluid volume within ordered parameters to optimize cerebral perfusion and minimize risk of hemorrhage  - Monitor temperature, glucose, and sodium.  Initiate appropriate interventions as ordered  Outcome: Progressing  Goal: Achieves maximal functionality and self care  Description: INTERVENTIONS  - Monitor swallowing and airway patency with patient fatigue and changes in neurological status  - Encourage and assist patient to increase activity and self care with guidance from PT/OT  - Encourage visually impaired, hearing impaired and aphasic patients to use assistive/communication devices  Outcome: Progressing     Problem: GASTROINTESTINAL - ADULT  Goal: Maintains adequate nutritional intake (undernourished)  Description: INTERVENTIONS:  - Monitor percentage of each meal consumed  - Identify factors contributing to decreased intake, treat as appropriate  - Assist with meals as needed  - Monitor I&O, WT and lab values  - Obtain nutritional consult as needed  - Optimize oral hygiene and moisture  - Encourage food from home; allow for food preferences  - Enhance eating environment  Outcome: Progressing     Problem: METABOLIC/FLUID AND ELECTROLYTES - ADULT  Goal: Electrolytes maintained within normal limits  Description: INTERVENTIONS:  - Monitor labs and rhythm and assess patient for signs and symptoms of electrolyte imbalances  - Administer electrolyte replacement as ordered  - Monitor response to electrolyte replacements, including rhythm and repeat lab results as appropriate  - Fluid restriction as ordered  - Instruct patient on fluid and nutrition restrictions as appropriate  Outcome: Progressing     Problem: Impaired Swallowing  Goal: Minimize aspiration risk  Description: Interventions:  - Patient should be alert and upright for all feedings (90 degrees preferred)  - Offer food and liquids at a slow rate  - No straws  - Encourage small bites of food and small sips of liquid  - Offer pills one at a time, crush or deliver with applesauce as needed  - Discontinue feeding and notify MD (or speech pathologist) if coughing or persistent throat clearing or wet/gurgly vocal quality is noted  Outcome: Progressing     Problem: Impaired Cognition  Goal: Patient will exhibit improved attention, thought processing and/or memory  Description: Interventions:  Outcome: Progressing

## 2023-07-28 NOTE — PLAN OF CARE
Received patient at 1. Patient drowsy/lethargic, arrousable to stimuli. Afib on tele, rates controlled. O2 saturation 98% on RA, 2L for supplement. Looks comfortable when laying still, however with turns pt will moan or grimace. PRN pain meds given with relief. Patient voiding with no issue. Incontinent of bowel and bladder. Bed is locked and in low position. Call light and personal items within reach. Tried to do RN dysphagia, patient failed. Plan is for PEG tube placement today, dobhoff needed prior. Clarified with hospitalist if it's ok if IR can place both. Dr. Sharmaine Lara agreed. Will pass on to day shift.     Problem: Diabetes/Glucose Control  Goal: Glucose maintained within prescribed range  Description: INTERVENTIONS:  - Monitor Blood Glucose as ordered  - Assess for signs and symptoms of hyperglycemia and hypoglycemia  - Administer ordered medications to maintain glucose within target range  - Assess barriers to adequate nutritional intake and initiate nutrition consult as needed  - Instruct patient on self management of diabetes  Outcome: Progressing     Problem: Patient/Family Goals  Goal: Patient/Family Long Term Goal  Description: Patient's Long Term Goal: go home    Interventions:  - MD to see, medications  - See additional Care Plan goals for specific interventions  Outcome: Progressing  Goal: Patient/Family Short Term Goal  Description: Patient's Short Term Goal: stable vital signs    Interventions:   - medications  - See additional Care Plan goals for specific interventions  Outcome: Progressing     Problem: CARDIOVASCULAR - ADULT  Goal: Maintains optimal cardiac output and hemodynamic stability  Description: INTERVENTIONS:  - Monitor vital signs, rhythm, and trends  - Monitor for bleeding, hypotension and signs of decreased cardiac output  - Evaluate effectiveness of vasoactive medications to optimize hemodynamic stability  - Monitor arterial and/or venous puncture sites for bleeding and/or hematoma  - Assess quality of pulses, skin color and temperature  - Assess for signs of decreased coronary artery perfusion - ex. Angina  - Evaluate fluid balance, assess for edema, trend weights  Outcome: Progressing  Goal: Absence of cardiac arrhythmias or at baseline  Description: INTERVENTIONS:  - Continuous cardiac monitoring, monitor vital signs, obtain 12 lead EKG if indicated  - Evaluate effectiveness of antiarrhythmic and heart rate control medications as ordered  - Initiate emergency measures for life threatening arrhythmias  - Monitor electrolytes and administer replacement therapy as ordered  Outcome: Progressing     Problem: NEUROLOGICAL - ADULT  Goal: Achieves stable or improved neurological status  Description: INTERVENTIONS  - Assess for and report changes in neurological status  - Initiate measures to prevent increased intracranial pressure  - Maintain blood pressure and fluid volume within ordered parameters to optimize cerebral perfusion and minimize risk of hemorrhage  - Monitor temperature, glucose, and sodium.  Initiate appropriate interventions as ordered  Outcome: Progressing  Goal: Achieves maximal functionality and self care  Description: INTERVENTIONS  - Monitor swallowing and airway patency with patient fatigue and changes in neurological status  - Encourage and assist patient to increase activity and self care with guidance from PT/OT  - Encourage visually impaired, hearing impaired and aphasic patients to use assistive/communication devices  Outcome: Progressing

## 2023-07-29 NOTE — PLAN OF CARE
Pt alert and opens eyes to speech. O2 saturations WNL. Afib on tele monitor. Q2 turns. NG tube in right nare. Awaiting recommendations from dietician. Morphine given per MAR. Bed in lowest position. Call light within reach. Problem: Diabetes/Glucose Control  Goal: Glucose maintained within prescribed range  Description: INTERVENTIONS:  - Monitor Blood Glucose as ordered  - Assess for signs and symptoms of hyperglycemia and hypoglycemia  - Administer ordered medications to maintain glucose within target range  - Assess barriers to adequate nutritional intake and initiate nutrition consult as needed  - Instruct patient on self management of diabetes  Outcome: Progressing     Problem: Patient/Family Goals  Goal: Patient/Family Long Term Goal  Description: Patient's Long Term Goal: go home    Interventions:  - MD to see, medications  - See additional Care Plan goals for specific interventions  Outcome: Progressing  Goal: Patient/Family Short Term Goal  Description: Patient's Short Term Goal: stable vital signs    Interventions:   - medications  - See additional Care Plan goals for specific interventions  Outcome: Progressing     Problem: CARDIOVASCULAR - ADULT  Goal: Maintains optimal cardiac output and hemodynamic stability  Description: INTERVENTIONS:  - Monitor vital signs, rhythm, and trends  - Monitor for bleeding, hypotension and signs of decreased cardiac output  - Evaluate effectiveness of vasoactive medications to optimize hemodynamic stability  - Monitor arterial and/or venous puncture sites for bleeding and/or hematoma  - Assess quality of pulses, skin color and temperature  - Assess for signs of decreased coronary artery perfusion - ex.  Angina  - Evaluate fluid balance, assess for edema, trend weights  Outcome: Progressing  Goal: Absence of cardiac arrhythmias or at baseline  Description: INTERVENTIONS:  - Continuous cardiac monitoring, monitor vital signs, obtain 12 lead EKG if indicated  - Evaluate effectiveness of antiarrhythmic and heart rate control medications as ordered  - Initiate emergency measures for life threatening arrhythmias  - Monitor electrolytes and administer replacement therapy as ordered  Outcome: Progressing     Problem: NEUROLOGICAL - ADULT  Goal: Achieves stable or improved neurological status  Description: INTERVENTIONS  - Assess for and report changes in neurological status  - Initiate measures to prevent increased intracranial pressure  - Maintain blood pressure and fluid volume within ordered parameters to optimize cerebral perfusion and minimize risk of hemorrhage  - Monitor temperature, glucose, and sodium.  Initiate appropriate interventions as ordered  Outcome: Progressing  Goal: Achieves maximal functionality and self care  Description: INTERVENTIONS  - Monitor swallowing and airway patency with patient fatigue and changes in neurological status  - Encourage and assist patient to increase activity and self care with guidance from PT/OT  - Encourage visually impaired, hearing impaired and aphasic patients to use assistive/communication devices  Outcome: Progressing     Problem: GASTROINTESTINAL - ADULT  Goal: Maintains adequate nutritional intake (undernourished)  Description: INTERVENTIONS:  - Monitor percentage of each meal consumed  - Identify factors contributing to decreased intake, treat as appropriate  - Assist with meals as needed  - Monitor I&O, WT and lab values  - Obtain nutritional consult as needed  - Optimize oral hygiene and moisture  - Encourage food from home; allow for food preferences  - Enhance eating environment  Outcome: Progressing     Problem: METABOLIC/FLUID AND ELECTROLYTES - ADULT  Goal: Electrolytes maintained within normal limits  Description: INTERVENTIONS:  - Monitor labs and rhythm and assess patient for signs and symptoms of electrolyte imbalances  - Administer electrolyte replacement as ordered  - Monitor response to electrolyte replacements, including rhythm and repeat lab results as appropriate  - Fluid restriction as ordered  - Instruct patient on fluid and nutrition restrictions as appropriate  Outcome: Progressing     Problem: Impaired Swallowing  Goal: Minimize aspiration risk  Description: Interventions:  - Patient should be alert and upright for all feedings (90 degrees preferred)  - Offer food and liquids at a slow rate  - No straws  - Encourage small bites of food and small sips of liquid  - Offer pills one at a time, crush or deliver with applesauce as needed  - Discontinue feeding and notify MD (or speech pathologist) if coughing or persistent throat clearing or wet/gurgly vocal quality is noted  Outcome: Progressing     Problem: Impaired Cognition  Goal: Patient will exhibit improved attention, thought processing and/or memory  Description: Interventions:    Outcome: Progressing     Problem: Safety Risk - Non-Violent Restraints  Goal: Patient will remain free from self-harm  Description: INTERVENTIONS:  - Apply the least restrictive restraint to prevent harm  - Notify patient and family of reasons restraints applied  - Assess for any contributing factors to confusion (electrolyte disturbances, delirium, medications)  - Discontinue any unnecessary medical devices as soon as possible  - Assess the patient's physical comfort, circulation, skin condition, hydration, nutrition and elimination needs   - Reorient and redirection as needed  - Assess for the need to continue restraints  Outcome: Progressing

## 2023-07-30 PROBLEM — E46 MALNUTRITION (HCC): Status: ACTIVE | Noted: 2023-01-01

## 2023-07-30 NOTE — PLAN OF CARE
Pt. On mod high back , drowsy / lethargic ; open eyes with speech . O2 at 3 li via nc , O2 sat at mid 90's. Pt. with productive cough , audible gurgling secretions , RR 27 . Suctioned pt. via oral pharyngeal using a catheter , with large thick milky secretions. IV Morphine given , pt. restless / grimacing. . Chest xray repeated. Turned and repositioned for comfort. Tele shows Afib on the monitor , HR at 110's. Pt. Needs total care ; skin and incontinency care provided. R nare NGT intact with on going feeding , franc[place]., it well. Cont. Monitor per tele/labs/v/s. Meds as ordered. Family at bedside ; POC discussed . Instructed to call staff when needed help.     0615 HGB 6.6 . Pageedna CURRIE  [id number] with order for blood transfusion.

## 2023-07-30 NOTE — PLAN OF CARE
Assumed pt care at 0730. DIDI orientation, lethargic. 4L NC, pt does not to appear to be in pain, resting comfortably. Lung sounds: crackles heard. Secretions suctioned via yankauer, nebs and CPT ordered. RR at 40 breaths/min, sepsis BPA firing, MD made aware. Lactic and blood cultures ordered. Afib on tele. Incontinent of bowel and bladder. NG tube in R nare; tube feedings running; increase Q8H as tolerated til goal of 70ml/hr. Total assist. Non-violent wrist restraints in place. Plan of care: blood transfusion, blood cultures pending, PEG tube tomorrow     Plan of care updated with patient and son (POA) at bedside. Questions answered, family verbalized understanding. Call light is within reach, will continue to monitor.      Problem: Diabetes/Glucose Control  Goal: Glucose maintained within prescribed range  Description: INTERVENTIONS:  - Monitor Blood Glucose as ordered  - Assess for signs and symptoms of hyperglycemia and hypoglycemia  - Administer ordered medications to maintain glucose within target range  - Assess barriers to adequate nutritional intake and initiate nutrition consult as needed  - Instruct patient on self management of diabetes  Outcome: Progressing     Problem: Patient/Family Goals  Goal: Patient/Family Long Term Goal  Description: Patient's Long Term Goal: go home    Interventions:  - MD to see, medications  - See additional Care Plan goals for specific interventions  Outcome: Progressing  Goal: Patient/Family Short Term Goal  Description: Patient's Short Term Goal: stable vital signs    Interventions:   - medications  - See additional Care Plan goals for specific interventions  Outcome: Progressing     Problem: CARDIOVASCULAR - ADULT  Goal: Maintains optimal cardiac output and hemodynamic stability  Description: INTERVENTIONS:  - Monitor vital signs, rhythm, and trends  - Monitor for bleeding, hypotension and signs of decreased cardiac output  - Evaluate effectiveness of vasoactive medications to optimize hemodynamic stability  - Monitor arterial and/or venous puncture sites for bleeding and/or hematoma  - Assess quality of pulses, skin color and temperature  - Assess for signs of decreased coronary artery perfusion - ex. Angina  - Evaluate fluid balance, assess for edema, trend weights  Outcome: Progressing  Goal: Absence of cardiac arrhythmias or at baseline  Description: INTERVENTIONS:  - Continuous cardiac monitoring, monitor vital signs, obtain 12 lead EKG if indicated  - Evaluate effectiveness of antiarrhythmic and heart rate control medications as ordered  - Initiate emergency measures for life threatening arrhythmias  - Monitor electrolytes and administer replacement therapy as ordered  Outcome: Progressing     Problem: NEUROLOGICAL - ADULT  Goal: Achieves stable or improved neurological status  Description: INTERVENTIONS  - Assess for and report changes in neurological status  - Initiate measures to prevent increased intracranial pressure  - Maintain blood pressure and fluid volume within ordered parameters to optimize cerebral perfusion and minimize risk of hemorrhage  - Monitor temperature, glucose, and sodium.  Initiate appropriate interventions as ordered  Outcome: Progressing  Goal: Achieves maximal functionality and self care  Description: INTERVENTIONS  - Monitor swallowing and airway patency with patient fatigue and changes in neurological status  - Encourage and assist patient to increase activity and self care with guidance from PT/OT  - Encourage visually impaired, hearing impaired and aphasic patients to use assistive/communication devices  Outcome: Progressing

## 2023-07-31 PROBLEM — A41.9 SEPSIS (HCC): Status: ACTIVE | Noted: 2023-01-01

## 2023-07-31 NOTE — PLAN OF CARE
Pt responsive to painful stimuli. O2 saturations WNL on 4L high flow nasal cannula. Respirations shallow and tachypneic. Afib on tele monitor. Pain medications given per eMAR. Pt transferred to ICU for higher level of care. Family updated on POC. Problem: Diabetes/Glucose Control  Goal: Glucose maintained within prescribed range  Description: INTERVENTIONS:  - Monitor Blood Glucose as ordered  - Assess for signs and symptoms of hyperglycemia and hypoglycemia  - Administer ordered medications to maintain glucose within target range  - Assess barriers to adequate nutritional intake and initiate nutrition consult as needed  - Instruct patient on self management of diabetes  Outcome: Progressing     Problem: Patient/Family Goals  Goal: Patient/Family Long Term Goal  Description: Patient's Long Term Goal: go home    Interventions:  - MD to see, medications  - See additional Care Plan goals for specific interventions  Outcome: Progressing  Goal: Patient/Family Short Term Goal  Description: Patient's Short Term Goal: stable vital signs    Interventions:   - medications  - See additional Care Plan goals for specific interventions  Outcome: Progressing     Problem: CARDIOVASCULAR - ADULT  Goal: Maintains optimal cardiac output and hemodynamic stability  Description: INTERVENTIONS:  - Monitor vital signs, rhythm, and trends  - Monitor for bleeding, hypotension and signs of decreased cardiac output  - Evaluate effectiveness of vasoactive medications to optimize hemodynamic stability  - Monitor arterial and/or venous puncture sites for bleeding and/or hematoma  - Assess quality of pulses, skin color and temperature  - Assess for signs of decreased coronary artery perfusion - ex.  Angina  - Evaluate fluid balance, assess for edema, trend weights  Outcome: Progressing  Goal: Absence of cardiac arrhythmias or at baseline  Description: INTERVENTIONS:  - Continuous cardiac monitoring, monitor vital signs, obtain 12 lead EKG if indicated  - Evaluate effectiveness of antiarrhythmic and heart rate control medications as ordered  - Initiate emergency measures for life threatening arrhythmias  - Monitor electrolytes and administer replacement therapy as ordered  Outcome: Progressing     Problem: NEUROLOGICAL - ADULT  Goal: Achieves stable or improved neurological status  Description: INTERVENTIONS  - Assess for and report changes in neurological status  - Initiate measures to prevent increased intracranial pressure  - Maintain blood pressure and fluid volume within ordered parameters to optimize cerebral perfusion and minimize risk of hemorrhage  - Monitor temperature, glucose, and sodium.  Initiate appropriate interventions as ordered  Outcome: Progressing  Goal: Achieves maximal functionality and self care  Description: INTERVENTIONS  - Monitor swallowing and airway patency with patient fatigue and changes in neurological status  - Encourage and assist patient to increase activity and self care with guidance from PT/OT  - Encourage visually impaired, hearing impaired and aphasic patients to use assistive/communication devices  Outcome: Progressing     Problem: GASTROINTESTINAL - ADULT  Goal: Maintains adequate nutritional intake (undernourished)  Description: INTERVENTIONS:  - Monitor percentage of each meal consumed  - Identify factors contributing to decreased intake, treat as appropriate  - Assist with meals as needed  - Monitor I&O, WT and lab values  - Obtain nutritional consult as needed  - Optimize oral hygiene and moisture  - Encourage food from home; allow for food preferences  - Enhance eating environment  Outcome: Progressing     Problem: METABOLIC/FLUID AND ELECTROLYTES - ADULT  Goal: Electrolytes maintained within normal limits  Description: INTERVENTIONS:  - Monitor labs and rhythm and assess patient for signs and symptoms of electrolyte imbalances  - Administer electrolyte replacement as ordered  - Monitor response to electrolyte replacements, including rhythm and repeat lab results as appropriate  - Fluid restriction as ordered  - Instruct patient on fluid and nutrition restrictions as appropriate  Outcome: Progressing     Problem: Impaired Swallowing  Goal: Minimize aspiration risk  Description: Interventions:  - Patient should be alert and upright for all feedings (90 degrees preferred)  - Offer food and liquids at a slow rate  - No straws  - Encourage small bites of food and small sips of liquid  - Offer pills one at a time, crush or deliver with applesauce as needed  - Discontinue feeding and notify MD (or speech pathologist) if coughing or persistent throat clearing or wet/gurgly vocal quality is noted  Outcome: Progressing     Problem: Impaired Cognition  Goal: Patient will exhibit improved attention, thought processing and/or memory  Description: Interventions:    Outcome: Progressing     Problem: Safety Risk - Non-Violent Restraints  Goal: Patient will remain free from self-harm  Description: INTERVENTIONS:  - Apply the least restrictive restraint to prevent harm  - Notify patient and family of reasons restraints applied  - Assess for any contributing factors to confusion (electrolyte disturbances, delirium, medications)  - Discontinue any unnecessary medical devices as soon as possible  - Assess the patient's physical comfort, circulation, skin condition, hydration, nutrition and elimination needs   - Reorient and redirection as needed  - Assess for the need to continue restraints  Outcome: Progressing

## 2023-07-31 NOTE — PLAN OF CARE
Received patient at [address]. Patient lethargic and weak. Unable to follow commands. Responds to name and/or pain stimuli. AFib  on tele. Prn meds given to control rate. O2 saturation 95% on 4L NC. Audible wet/gurgled sound. Suctioning as needed to clear secretions. Secretions are very thick and white. Patient incontinent. Briefed and primo in place. Bed is locked and in low position. Call light and personal items within reach. 2000: Lactic acid came back 3.8. notified on call hospitalist. Also notified of patient's conditions with 44 respirations and increased HR. Fever with temperature as high as 101F. Blood pressure stable. Ordered for another lactic to be drawn and continue to monitor of patient's status. 0400: lactic acid: 2.7. continued to apply cold packs and give prn iv morphine for pain since pt winces and grimaces with movement or even at rest at times. HR: 110-20s, prn cardizem given as tolerated.

## 2023-07-31 NOTE — PROGRESS NOTES
Received pt from [facility] around 1235 this afternoon. Pt lethargic, grimaces with stimulation/care but not withdrawing from pain in extremities. On 4L O2, tachypneic with RR 30s. Cardizem gtt ordered, HR improved after starting. CTA chest/head completed, MD notified of results. Primofit in place. NG to right nare, TFs held per CCAPN. See flowsheets and MAR for further data.

## 2023-08-01 NOTE — PLAN OF CARE
Assumed pt care at 0730. Pt in bed resting quietly on 8L/HFNC. No s/s of acute distress noted at this time. VSS. Pt reported pain mid-day, generalized, medicated per MAR. NGT in place for tube feedings and medication administration, tolerating feedings well. Family updated at the bedside throughout the day. POC continues. Call light in reach. Will continue to monitor.

## 2023-08-01 NOTE — CM/SW NOTE
Care Progression Note:  Length of stay: 13  GMLOS: 4.9  Avoidable Delays: Family/Patient related  Code Status: Full    Acute Medical Issue/Factors:   Metastasis from malignant tumor of prostate ([address]   Pt transferred to ICU for increased O2 needs and tachypnea. All providers have discussed poor prognosis and son/family continue to want aggressive measures    Discharge Barriers: Readiness for acceptance/change  Expected discharge date:    Expected next site of care: [facility] / available for discharge planning.      Tayla VELASQUEZA MSN, RN CTL/  [id number]

## 2023-08-01 NOTE — PROGRESS NOTES
08/01/23 1132   Clinical Encounter Type   Visited With Patient; Family   Taxonomy   Intended Effects Preserve dignity and respect   Methods Offer spiritual/Jew support   Interventions Acknowledge current situation   Trigger for Consult   Trigger for Spiritual Care Consult No      responded to trigger for consult. Patient in bed sleeping. Son at bedside advised that patient is connected with local [employer] community and has a very supportive family.  advised spiritual care is available should they need any further assistance.  remains available for support. Spiritual Care support can be requested via an Epic consult. For urgent/immediate needs, please contact the On Call  at ext. [phone]. MABLE Block. Div  Extension:[phone]

## 2023-08-01 NOTE — PLAN OF CARE
Received patient lethargic, grimaces to pain but does not WTP. 7L HF increased to 9L. Tachypneic. Afebrile. Afib on the monitor. Cardizem gtt infusing. NG in place and clamped.

## 2023-08-02 NOTE — PLAN OF CARE
Assumed care of pt following shift report. Pt is currently lethargic. Unable to assess orientation. Partially follows commands. Afebrile and normotensive for shift. ECG shows A-fib with occasional PVC's. Remains on cardizem drip. Received pt on 9L HFNC, however, pt had labored breathing and would frequently desat. Pt placed back on BiPAP. BENNIE Quinteros aware. Continues to receive scheduled CPT. No BM. Bowel sounds active. TF infusing and being increased as ordered. TF held after midnight d/t possible PEG tube placement tomorrow. Primofit in place with adequate urine output.

## 2023-08-02 NOTE — PLAN OF CARE
Received patient this morning non responsive  Atrial fib on monitor,cardizem remains at 10/hr  On Bipap, able to wean to 50% throughout day  Lungs remain diminished bilaterally  Tube feeds restarted this afternoon as no PEG tube placed  Family at bedside throughout shift  All needs met  Will monitor.

## 2023-08-03 NOTE — PLAN OF CARE
Assumed care of pt following shift report. Pt is lethargic and not following commands. Does not open eyes to voice. Non-purposeful movements. Prn Morphine administered x1. Afebrile and normotensive. ECG shows A-fib with occasional PVC's. Cardizem gtt infusing and titrated. On BiPAP and tolerating. Primofit in place with adequate urine output. Tube feedings infusing and being increased per orders with 200 mL FWF q 4 hrs. Bowel sounds active. No BM on shift. Q6 accuchecks. Reviewed plan of care and current medications with pt's family. Explained interventions in place and goals of care. Low Hgb and Plts this morning. RG AVERY notified. Orders received to transfuse. Awaiting type and screen. See MAR and flowsheets for additional assessments.

## 2023-08-03 NOTE — PLAN OF CARE
Assumed care of patient this am, potassium replaced per protocol. Weaned to VT 25L/40%. Cardizem gtt off/transitioned to PO. X1 BM. 1 unit PRBCs given/1 unit Platelets given. DHT with TF. Family at bedside in agreement with plan of care.

## 2023-08-04 NOTE — PLAN OF CARE
Alert. Intermittently able to follow simple commands. Nonverbal. Nods Y/N. Weaned to 2L NC. CPT/nebs. Afebrile. Hemodynamically stable. Afib. TF via NG. External urinary catheter. Tentative plan for PEG Monday 8/7 @ 1000.

## 2023-08-04 NOTE — IVS NOTE
Gastrostomy Tube Placement by IR on Monday 8/7/23, tentatively scheduled for 1000. Things needed prior to procedure   NPO at midnight before procedure  No Anticoagulants the night before and the day of procedure  Barium, from Xray, 1 bottle of 6oz or 8oz, given on Sunday 8/6/23 at 2300    Will reach out Monday morning 8/7/23. Thank you.

## 2023-08-04 NOTE — PLAN OF CARE
Problem: CARDIOVASCULAR - ADULT  Goal: Maintains optimal cardiac output and hemodynamic stability  Description: INTERVENTIONS:  - Monitor vital signs, rhythm, and trends  - Monitor for bleeding, hypotension and signs of decreased cardiac output  - Evaluate effectiveness of vasoactive medications to optimize hemodynamic stability  - Monitor arterial and/or venous puncture sites for bleeding and/or hematoma  - Assess quality of pulses, skin color and temperature  - Assess for signs of decreased coronary artery perfusion - ex.  Angina  - Evaluate fluid balance, assess for edema, trend weights  Outcome: Progressing  Goal: Absence of cardiac arrhythmias or at baseline  Description: INTERVENTIONS:  - Continuous cardiac monitoring, monitor vital signs, obtain 12 lead EKG if indicated  - Evaluate effectiveness of antiarrhythmic and heart rate control medications as ordered  - Initiate emergency measures for life threatening arrhythmias  - Monitor electrolytes and administer replacement therapy as ordered  Outcome: Progressing     Problem: GASTROINTESTINAL - ADULT  Goal: Maintains adequate nutritional intake (undernourished)  Description: INTERVENTIONS:  - Monitor percentage of each meal consumed  - Identify factors contributing to decreased intake, treat as appropriate  - Assist with meals as needed  - Monitor I&O, WT and lab values  - Obtain nutritional consult as needed  - Optimize oral hygiene and moisture  - Encourage food from home; allow for food preferences  - Enhance eating environment  Outcome: Progressing     Problem: METABOLIC/FLUID AND ELECTROLYTES - ADULT  Goal: Electrolytes maintained within normal limits  Description: INTERVENTIONS:  - Monitor labs and rhythm and assess patient for signs and symptoms of electrolyte imbalances  - Administer electrolyte replacement as ordered  - Monitor response to electrolyte replacements, including rhythm and repeat lab results as appropriate  - Fluid restriction as ordered  - Instruct patient on fluid and nutrition restrictions as appropriate  Outcome: Progressing     Problem: Safety Risk - Non-Violent Restraints  Goal: Patient will remain free from self-harm  Description: INTERVENTIONS:  - Apply the least restrictive restraint to prevent harm  - Notify patient and family of reasons restraints applied  - Assess for any contributing factors to confusion (electrolyte disturbances, delirium, medications)  - Discontinue any unnecessary medical devices as soon as possible  - Assess the patient's physical comfort, circulation, skin condition, hydration, nutrition and elimination needs   - Reorient and redirection as needed  - Assess for the need to continue restraints  Outcome: Progressing     Problem: RESPIRATORY - ADULT  Goal: Achieves optimal ventilation and oxygenation  Description: INTERVENTIONS:  - Assess for changes in respiratory status  - Assess for changes in mentation and behavior  - Position to facilitate oxygenation and minimize respiratory effort  - Oxygen supplementation based on oxygen saturation or ABGs  - Provide Smoking Cessation handout, if applicable  - Encourage broncho-pulmonary hygiene including cough, deep breathe, Incentive Spirometry  - Assess the need for suctioning and perform as needed  - Assess and instruct to report SOB or any respiratory difficulty  - Respiratory Therapy support as indicated  - Manage/alleviate anxiety  - Monitor for signs/symptoms of CO2 retention  Outcome: Progressing

## 2023-08-04 NOTE — PLAN OF CARE
Assumed care of patient following shift report. Received patient GCS 10, lethargic, nonpurposeful movement/vocalization. VSS on vapotherm, 25 L at 40%, weaned down to 30% this shift. Congested cough, requiring deep oral suction to remove secretions. Afib with PVCs on monitor, BP stable. Afebrile. External catheter in place, urine output sufficient, yellow with sediment. Multiple creamy/loose bowel movements this shift. Tolerating tube feeds, rate advanced to goal. BG uncontrolled, accuchecks Q6. CPOT 0-2 s/p PRN Tylenol. Safety maintained. cooperative with exam, normal mood with an appropriate affect

## 2023-08-05 NOTE — PLAN OF CARE
Assumed care of pt for the night shift. Pt alert, nonverbal, incomprehensible sounds. Pt remained on 2L NC. See flowsheets for further assessment.

## 2023-08-05 NOTE — PLAN OF CARE
Assumed care of patient this am, weaned off of O2, tolerating well. Multiple Bms - CDIF sent per MD (-). Tolerating Tfs through NG. External cath in place with adequate uo. Accuchecks reviewed and insulin dosage adjusted per MD. Transfer orders in place.

## 2023-08-06 NOTE — PROGRESS NOTES
Received patient from ICU and assumed care for patient [phone]. Pt made comfortable. O2 sats at >90% Restarted tube feeding to PEG tube on R nare. Tube feeding at goal. See MAR for glucose control. On tele-afib. Family at bedside. Updated on POC. Safety measures. Care ongoing.

## 2023-08-06 NOTE — PLAN OF CARE
Non verbal but opens eyes w/ physical & verbal stimulation. Tube feedings maintained at goal of 60cc/hr. Kept NPO,oral care rendered. Head of bed kept elevated. Vital signs stable. 1 unit platelet transfusion completed. W/ no adverse reactions from transfusion. Vital signs remain WNL. Zosyn for PNA administered w/ non adverse reactions Turned & repositioned. Incotinence care needs rendered. Frequent roundings done.

## 2023-08-06 NOTE — PLAN OF CARE
Assumed care of pt at approximately 1930. Receieved pt resting in bed, RA, O2 sats >90%. Pt alert, nonverbal, incomprehensible sounds. Afebrile. Loose BM x1. NG tube intact, tolerating TF at goal. Accu check Q6 hours. See flowsheet. Transfer orders. Called report to [facility], [facility] RN. Transferred pt w/belongings at approximately [phone].

## 2023-08-07 PROBLEM — R13.10 DYSPHAGIA: Status: ACTIVE | Noted: 2023-01-01

## 2023-08-07 NOTE — PLAN OF CARE
Patient is nonverbal, on RA, vitals are stable. Tube feedings stopped at midnight, pt started on D10 continous infusion. NPO, plan for PEG tube placement. Low platelets, 12 U of platelets transfused overnight. Pt incontinent, changed and repositioned. Pt very congested, deep suction done by respiratory. All meds given per Mar, will continue to monitor.      Problem: Diabetes/Glucose Control  Goal: Glucose maintained within prescribed range  Description: INTERVENTIONS:  - Monitor Blood Glucose as ordered  - Assess for signs and symptoms of hyperglycemia and hypoglycemia  - Administer ordered medications to maintain glucose within target range  - Assess barriers to adequate nutritional intake and initiate nutrition consult as needed  - Instruct patient on self management of diabetes  Outcome: Progressing     Problem: CARDIOVASCULAR - ADULT  Goal: Absence of cardiac arrhythmias or at baseline  Description: INTERVENTIONS:  - Continuous cardiac monitoring, monitor vital signs, obtain 12 lead EKG if indicated  - Evaluate effectiveness of antiarrhythmic and heart rate control medications as ordered  - Initiate emergency measures for life threatening arrhythmias  - Monitor electrolytes and administer replacement therapy as ordered  Outcome: Progressing

## 2023-08-08 NOTE — CM/SW NOTE
Care Progression Note:  Length of stay: 20  GMLOS: 4.9  Avoidable Delays: Family/Patient related  Code Status: Full Code    Acute Medical Issue/Factors:   Metastasis from malignant tumor of prostate ([address]   IR placed g-tube 8/7. Tube feedings on hold for 24 hrs (placed at 3:49 8/7) Pt remains NPO due to aspiration. Pt continues to have increased secretions and require suctioning. RN noted congestion and rales 8/7. IV lasix given. Febrile 8/7 at 2354 Temp 100.6. Afebrile since this time. Contacted pt's son Juan Daniel Grimm [facility]) to discuss plans for DC when patient is medically cleared. Juan Daniel Grimm reports the plan is to return to [facility]. We discussed pt going home alternatively and that if he were to go home we would need time to get DME in place, including supplies for his tube feeding. Pt's son with questions re: tube feeding process in the home setting, for after DC from [facility]. Will reach out to Dietitian to get recs for home TF. Encouraged pt's son to inform SW/CM if his wishes for DC disposition changes to allow timely ordering of any needed DME.      1500 Pt with new episode of respiratory distress and hypoxia this PM. Per RNLance oxygen saturation was in the 70's on room air. RT collected ABGs on room air. Results given to Dr. Derek Snowden. Pt placed on BIPAP. Dr. Andersen Safe updated. STAT Portable CXR ordered. Dr Derek Snowden also notified. Agrees w/ plan to repeat CXR and eval.      Discharge Barriers: Readiness for acceptance/change  Expected discharge date: TBD   Expected next site of care: [facility]    / available for discharge planning.      TIN SalazarN, VIA [facility]    [id number]

## 2023-08-08 NOTE — PLAN OF CARE
Assumed care of pt at [address]. Pt alert, nonverbal. On 2L nc, satting 98%. Post-op vitals completed per orders. ST on tele. Max temp 101.4, tylenol given. Sepsis BPA fired, MD notified. Cadizem dose given per MD. PEG tube in place, dressing c/d/I. Medications given via NG tube, NPO maintained. RT to bedside for pharyngeal suctioning. Repositioned frequently. IVF & IV abx infusing per MAR. Family at bedside, updated on progressing POC. Safety precautions in place. 0500: G-tube patent. Flushed per orders. Site cleansed with peroxide & betadine. Dressing changed, split gauze & abd applied.      Problem: CARDIOVASCULAR - ADULT  Goal: Absence of cardiac arrhythmias or at baseline  Description: INTERVENTIONS:  - Continuous cardiac monitoring, monitor vital signs, obtain 12 lead EKG if indicated  - Evaluate effectiveness of antiarrhythmic and heart rate control medications as ordered  - Initiate emergency measures for life threatening arrhythmias  - Monitor electrolytes and administer replacement therapy as ordered  Outcome: Progressing     Problem: GASTROINTESTINAL - ADULT  Goal: Maintains adequate nutritional intake (undernourished)  Description: INTERVENTIONS:  - Monitor percentage of each meal consumed  - Identify factors contributing to decreased intake, treat as appropriate  - Assist with meals as needed  - Monitor I&O, WT and lab values  - Obtain nutritional consult as needed  - Optimize oral hygiene and moisture  - Encourage food from home; allow for food preferences  - Enhance eating environment  Outcome: Progressing     Problem: RESPIRATORY - ADULT  Goal: Achieves optimal ventilation and oxygenation  Description: INTERVENTIONS:  - Assess for changes in respiratory status  - Assess for changes in mentation and behavior  - Position to facilitate oxygenation and minimize respiratory effort  - Oxygen supplementation based on oxygen saturation or ABGs  - Provide Smoking Cessation handout, if applicable  - Encourage broncho-pulmonary hygiene including cough, deep breathe, Incentive Spirometry  - Assess the need for suctioning and perform as needed  - Assess and instruct to report SOB or any respiratory difficulty  - Respiratory Therapy support as indicated  - Manage/alleviate anxiety  - Monitor for signs/symptoms of CO2 retention  Outcome: Progressing

## 2023-08-08 NOTE — PROGRESS NOTES
Patient npo during shift for g-tube insertion, obtained verbal consent on phone from power of  with another nurse as witness. Patient non-verbal in poor condition. Notified Dr Marta Becerril of rales and congestion, and obtained an order for lasix iv during shift. Patient on telemetry rate 115. Suctioned patient with yankauer catheter and obtained beige secretions which were thick.  Patient returned from g-tube insertion at 1800, DRESSING DRY AND INTACT, PATIENT INCOENTIENT OF URINE

## 2023-08-08 NOTE — CM/SW NOTE
SW sent updated clinical information to [facility] for eventual patient admission. SW will continue to follow for medical clearance. SW will continue to follow for plan of care changes and remain available for any additional DC needs or concerns.      Niya Paige MSW, [facility]  Discharge Planner   [id number]

## 2023-08-08 NOTE — PROGRESS NOTES
Received patient alert and nonverbal. Medications given per MAR. Patient had episode of desating 78-80%. Nonrebreather placed to bring oxygen up. MD and pulm and respiratory notified. ABG. Patient placed on bipap. NG tube pulled. Cleared to use g tube and resume feedings. G tube site cleansed and dressing changed. Patient tolerating feedings. Safety precautions in place. Call light within reach.

## 2023-08-09 NOTE — PLAN OF CARE
Remain lethargic. Open his eyes to verbal stimuli. Afebrile overnight. Oral care done. Oxygen saturation 100 on AVAPS. Iv antibiotic continues.    Problem: RESPIRATORY - ADULT  Goal: Achieves optimal ventilation and oxygenation  Description: INTERVENTIONS:  - Assess for changes in respiratory status  - Assess for changes in mentation and behavior  - Position to facilitate oxygenation and minimize respiratory effort  - Oxygen supplementation based on oxygen saturation or ABGs  - Provide Smoking Cessation handout, if applicable  - Encourage broncho-pulmonary hygiene including cough, deep breathe, Incentive Spirometry  - Assess the need for suctioning and perform as needed  - Assess and instruct to report SOB or any respiratory difficulty  - Respiratory Therapy support as indicated  - Manage/alleviate anxiety  - Monitor for signs/symptoms of CO2 retention  Outcome: Not Progressing

## 2023-08-09 NOTE — PROGRESS NOTES
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 This has been discussed with family    Critical care time: 35 minutes      Subjective:  Remains on AVAPS    Objective:   08/09/23  0539 08/09/23  0732 08/09/23  0735 08/09/23  0925   BP: 120/75  114/69    BP Location: Right arm  Right arm    Pulse: 88 94 102 105   Resp: 21 26 22 (!) 28   Temp:       TempSrc:       SpO2: 100% 100% 100% 100%   Weight:         Physical Exam:  General: on AVAPS  Skin: no rash, ulcers or subcutaneous nodules  Eyes: anicteric sclerae, moist conjunctivae  Head, ears, nose, throat: atraumatic, oropharynx clear with moist mucous membranes  Neck: trachea midline with no thyromegaly  Heart: regular rate and rhythm, no murmurs / rubs / gallops  Lungs: bibasilar rhonchi  Extremities: no edema or cyanosis  Psych: opens eyes to voice    Medications:  Reviewed in EMR    Lab Data:  Reviewed in EMR    Imaging:  I independently visualized all relevant chest imaging in PACS and agree with radiology interpretation except where noted.

## 2023-08-10 NOTE — PLAN OF CARE
Pt drowsy, arousable to verbal stimuli. Responds to name. Gestures to simple questions. Lungs diminished bilaterally. AVAP overnight, still on as of this morning. O2 sat 100% while on AVAPs. Transitioned to nasal cannula at 4L. O2 sat maintained at %, weaned as tolerated, O2 at 2L by this evening. Breathing nonlabored, no cough noted. Afib on telemetry, HR 90-100s. Glucerna TF at 60cc/hour. Tolerating tube feeding, no residuals noted. Meds given per [facility] via Gtube. Redness to periarea, incontinent of bowel and bladder, large bowel movement this evening. Pericare given, cream applied. Repositioned at timed intervals. Family at bedside visiting. Updated re: plan of care, verbalizes understanding. Needs addressed.     Problem: Diabetes/Glucose Control  Goal: Glucose maintained within prescribed range  Description: INTERVENTIONS:  - Monitor Blood Glucose as ordered  - Assess for signs and symptoms of hyperglycemia and hypoglycemia  - Administer ordered medications to maintain glucose within target range  - Assess barriers to adequate nutritional intake and initiate nutrition consult as needed  - Instruct patient on self management of diabetes  Outcome: Progressing     Problem: CARDIOVASCULAR - ADULT  Goal: Absence of cardiac arrhythmias or at baseline  Description: INTERVENTIONS:  - Continuous cardiac monitoring, monitor vital signs, obtain 12 lead EKG if indicated  - Evaluate effectiveness of antiarrhythmic and heart rate control medications as ordered  - Initiate emergency measures for life threatening arrhythmias  - Monitor electrolytes and administer replacement therapy as ordered  Outcome: Progressing     Problem: NEUROLOGICAL - ADULT  Goal: Achieves stable or improved neurological status  Description: INTERVENTIONS  - Assess for and report changes in neurological status  - Initiate measures to prevent increased intracranial pressure  - Maintain blood pressure and fluid volume within ordered parameters to optimize cerebral perfusion and minimize risk of hemorrhage  - Monitor temperature, glucose, and sodium.  Initiate appropriate interventions as ordered  Outcome: Progressing  Goal: Achieves maximal functionality and self care  Description: INTERVENTIONS  - Monitor swallowing and airway patency with patient fatigue and changes in neurological status  - Encourage and assist patient to increase activity and self care with guidance from PT/OT  - Encourage visually impaired, hearing impaired and aphasic patients to use assistive/communication devices  Outcome: Not Progressing     Problem: GASTROINTESTINAL - ADULT  Goal: Maintains adequate nutritional intake (undernourished)  Description: INTERVENTIONS:  - Monitor percentage of each meal consumed  - Identify factors contributing to decreased intake, treat as appropriate  - Assist with meals as needed  - Monitor I&O, WT and lab values  - Obtain nutritional consult as needed  - Optimize oral hygiene and moisture  - Encourage food from home; allow for food preferences  - Enhance eating environment  Outcome: Progressing     Problem: METABOLIC/FLUID AND ELECTROLYTES - ADULT  Goal: Electrolytes maintained within normal limits  Description: INTERVENTIONS:  - Monitor labs and rhythm and assess patient for signs and symptoms of electrolyte imbalances  - Administer electrolyte replacement as ordered  - Monitor response to electrolyte replacements, including rhythm and repeat lab results as appropriate  - Fluid restriction as ordered  - Instruct patient on fluid and nutrition restrictions as appropriate  Outcome: Progressing     Problem: Impaired Swallowing  Goal: Minimize aspiration risk  Description: Interventions:  - Patient should be alert and upright for all feedings (90 degrees preferred)  - Offer food and liquids at a slow rate  - No straws  - Encourage small bites of food and small sips of liquid  - Offer pills one at a time, crush or deliver with applesauce as needed  - Discontinue feeding and notify MD (or speech pathologist) if coughing or persistent throat clearing or wet/gurgly vocal quality is noted  Outcome: Progressing     Problem: RESPIRATORY - ADULT  Goal: Achieves optimal ventilation and oxygenation  Description: INTERVENTIONS:  - Assess for changes in respiratory status  - Assess for changes in mentation and behavior  - Position to facilitate oxygenation and minimize respiratory effort  - Oxygen supplementation based on oxygen saturation or ABGs  - Provide Smoking Cessation handout, if applicable  - Encourage broncho-pulmonary hygiene including cough, deep breathe, Incentive Spirometry  - Assess the need for suctioning and perform as needed  - Assess and instruct to report SOB or any respiratory difficulty  - Respiratory Therapy support as indicated  - Manage/alleviate anxiety  - Monitor for signs/symptoms of CO2 retention  Outcome: Progressing

## 2023-08-10 NOTE — PROGRESS NOTES
Patient admitted to [facility]. He was sent with Morphine script 6 tablets. Nurse requesting medication be sent to [facility]

## 2023-08-10 NOTE — PLAN OF CARE
Pt drowsy, easily arousable. Lungs diminished bilaterally, congested cough. On 2L nasal cannula, O2 sat %. Oral suction and oral care as needed. Afib on tele, HR controlled, on scheduled cardizem. Gtube with glucerna 1.5 feedings at 60cc/hr. 60ml residual noted this morning. NPO, HOB at 30 degrees at all time. Aspiration precautions maintained. Repositioned at timed intervals, max assist x 3 with care provided. Periarea red, pericare given, cream applied. Seen by Dr. Suad Yao and Dr. Kennyth Meckel. Ok to transferred to [facility] today. Report called to RN at [facility]. Scheduled meds given per MAR. Vitals stable, afebrile. Updated pt's family re: plan of care, discharge plans. Verbalizes understanding. Needs attended.      Problem: Diabetes/Glucose Control  Goal: Glucose maintained within prescribed range  Description: INTERVENTIONS:  - Monitor Blood Glucose as ordered  - Assess for signs and symptoms of hyperglycemia and hypoglycemia  - Administer ordered medications to maintain glucose within target range  - Assess barriers to adequate nutritional intake and initiate nutrition consult as needed  - Instruct patient on self management of diabetes  Outcome: Progressing     Problem: CARDIOVASCULAR - ADULT  Goal: Absence of cardiac arrhythmias or at baseline  Description: INTERVENTIONS:  - Continuous cardiac monitoring, monitor vital signs, obtain 12 lead EKG if indicated  - Evaluate effectiveness of antiarrhythmic and heart rate control medications as ordered  - Initiate emergency measures for life threatening arrhythmias  - Monitor electrolytes and administer replacement therapy as ordered  Outcome: Progressing     Problem: NEUROLOGICAL - ADULT  Goal: Achieves stable or improved neurological status  Description: INTERVENTIONS  - Assess for and report changes in neurological status  - Initiate measures to prevent increased intracranial pressure  - Maintain blood pressure and fluid volume within ordered parameters to optimize cerebral perfusion and minimize risk of hemorrhage  - Monitor temperature, glucose, and sodium.  Initiate appropriate interventions as ordered  Outcome: Progressing     Problem: GASTROINTESTINAL - ADULT  Goal: Maintains adequate nutritional intake (undernourished)  Description: INTERVENTIONS:  - Monitor percentage of each meal consumed  - Identify factors contributing to decreased intake, treat as appropriate  - Assist with meals as needed  - Monitor I&O, WT and lab values  - Obtain nutritional consult as needed  - Optimize oral hygiene and moisture  - Encourage food from home; allow for food preferences  - Enhance eating environment  Outcome: Progressing     Problem: METABOLIC/FLUID AND ELECTROLYTES - ADULT  Goal: Electrolytes maintained within normal limits  Description: INTERVENTIONS:  - Monitor labs and rhythm and assess patient for signs and symptoms of electrolyte imbalances  - Administer electrolyte replacement as ordered  - Monitor response to electrolyte replacements, including rhythm and repeat lab results as appropriate  - Fluid restriction as ordered  - Instruct patient on fluid and nutrition restrictions as appropriate  Outcome: Progressing     Problem: Impaired Swallowing  Goal: Minimize aspiration risk  Description: Interventions:  - Patient should be alert and upright for all feedings (90 degrees preferred)  - Offer food and liquids at a slow rate  - No straws  - Encourage small bites of food and small sips of liquid  - Offer pills one at a time, crush or deliver with applesauce as needed  - Discontinue feeding and notify MD (or speech pathologist) if coughing or persistent throat clearing or wet/gurgly vocal quality is noted  Outcome: Progressing     Problem: RESPIRATORY - ADULT  Goal: Achieves optimal ventilation and oxygenation  Description: INTERVENTIONS:  - Assess for changes in respiratory status  - Assess for changes in mentation and behavior  - Position to facilitate oxygenation and minimize respiratory effort  - Oxygen supplementation based on oxygen saturation or ABGs  - Provide Smoking Cessation handout, if applicable  - Encourage broncho-pulmonary hygiene including cough, deep breathe, Incentive Spirometry  - Assess the need for suctioning and perform as needed  - Assess and instruct to report SOB or any respiratory difficulty  - Respiratory Therapy support as indicated  - Manage/alleviate anxiety  - Monitor for signs/symptoms of CO2 retention  Outcome: Progressing

## 2023-08-10 NOTE — PLAN OF CARE
Pt lethargic, arousable to verbal stimuli. Satting 100% on 3L nasal cannula. VSS overnight. Tele Afib. Glucerna TF @ 60 ml/hr. Pt tolerating feeds. Accucheck Q6. IV ABX infusing. All meds given per [facility] through G tube. Flushed per orders. Incontinent x2, incontinent associated dermatitis present, creams applied. Needs addressed. Will continue plan of care.     Problem: Diabetes/Glucose Control  Goal: Glucose maintained within prescribed range  Description: INTERVENTIONS:  - Monitor Blood Glucose as ordered  - Assess for signs and symptoms of hyperglycemia and hypoglycemia  - Administer ordered medications to maintain glucose within target range  - Assess barriers to adequate nutritional intake and initiate nutrition consult as needed  - Instruct patient on self management of diabetes  Outcome: Progressing     Problem: CARDIOVASCULAR - ADULT  Goal: Absence of cardiac arrhythmias or at baseline  Description: INTERVENTIONS:  - Continuous cardiac monitoring, monitor vital signs, obtain 12 lead EKG if indicated  - Evaluate effectiveness of antiarrhythmic and heart rate control medications as ordered  - Initiate emergency measures for life threatening arrhythmias  - Monitor electrolytes and administer replacement therapy as ordered  Outcome: Progressing     Problem: NEUROLOGICAL - ADULT  Goal: Achieves maximal functionality and self care  Description: INTERVENTIONS  - Monitor swallowing and airway patency with patient fatigue and changes in neurological status  - Encourage and assist patient to increase activity and self care with guidance from PT/OT  - Encourage visually impaired, hearing impaired and aphasic patients to use assistive/communication devices  Outcome: Progressing     Problem: METABOLIC/FLUID AND ELECTROLYTES - ADULT  Goal: Electrolytes maintained within normal limits  Description: INTERVENTIONS:  - Monitor labs and rhythm and assess patient for signs and symptoms of electrolyte imbalances  - Administer electrolyte replacement as ordered  - Monitor response to electrolyte replacements, including rhythm and repeat lab results as appropriate  - Fluid restriction as ordered  - Instruct patient on fluid and nutrition restrictions as appropriate  Outcome: Progressing

## 2023-08-10 NOTE — CM/SW NOTE
08/10/23 1300   Discharge disposition   Expected discharge disposition subacute   Discharge transportation [facility] noted that patient is cleared for DC to [address] today. SW confirmed with [address] that they are able to accept patient back today. SW met with patient's family at bedside to confirm that they are agreeable with DC. [facility] [phone] has been requested to arrange ambulance for  time of 4pm. PCS form completed. [address] [phone]     SW will continue to follow for plan of care changes and remain available for any additional DC needs or concerns.      Gina Gasca [facility], [facility]  Discharge Planner   [id number]

## 2023-08-12 PROBLEM — R06.02 SHORTNESS OF BREATH: Status: ACTIVE | Noted: 2023-01-01

## 2023-08-12 PROBLEM — J98.11 ATELECTASIS: Status: ACTIVE | Noted: 2023-01-01

## 2023-08-12 PROBLEM — D64.9 ANEMIA, UNSPECIFIED TYPE: Status: ACTIVE | Noted: 2023-01-01

## 2023-08-12 PROBLEM — J69.0 ASPIRATION PNEUMONITIS (HCC): Status: ACTIVE | Noted: 2023-01-01

## 2023-08-12 PROBLEM — R09.02 HYPOXIA: Status: ACTIVE | Noted: 2023-01-01

## 2023-08-12 NOTE — ED QUICK NOTES
Orders for admission, patient is aware of plan and ready to go upstairs. Any questions, please call ED RN Michele Hartley at extension [phone].      Patient Covid vaccination status: Fully vaccinated     COVID Test Ordered in ED: SARS-CoV-2/Flu A and B/RSV by PCR (GeneXpert)    COVID Suspicion at Admission: N/A    Running Infusions:  None    Mental Status/LOC at time of transport:     Other pertinent information:   CIWA score: N/A   NIH score:  N/A

## 2023-08-12 NOTE — PROGRESS NOTES
Critical Care Addendum Note    - over the course of the day, pt's condition continued to deteriorate despite aggressive interventions including transfusion of blood products, IV abx, IVF boluses, cardizem IVP prn for afib with RVR, NIPPV with increasing FIO2 and ventilatory support and maxed 2 pressor support. Pt remained in SBP ~40's for majority of the day and was completely unresponsive. At this time, I explained to pt's son and family members that death was imminent. Given the inability to improve with all interventions provided, and taking into consideration pt's underlying medical illnesses, I felt that doing CPR/chest compressions would be futile. As such, we maintained all levels of care but when patient developed sudden asystolic arrest, pt was allowed to . Family was allowed to grieve with patient. All questions were answered.   Time of death: 13:46    Marika Riley MD  23

## 2023-08-12 NOTE — PLAN OF CARE
Assumed care of patient this am, worsening resp status, increased sbp support. Mds at bedside, discussion with family. Patient  1350. No audible breath sounds, no palpable pulses, asystole confirmed on all leads. Family at bedside.  home designated. Post mortem care provided. Patient to [facility] 1630.

## 2023-08-12 NOTE — ED QUICK NOTES
Family at bedside, consent obtained from [facility] AND [facility] for blood transfusion.

## 2023-08-12 NOTE — ED INITIAL ASSESSMENT (HPI)
Pt arrives via EMS from [facility], hx of prostate cancer with mets. Chronically on 2L NC but has been having increased WOB and was placed on 4L. Pt is awake to painful stimuli only per norm.

## 2023-08-12 NOTE — PLAN OF CARE
[facility] [address] [facility].fib on tele. Patient is alert to self, family member (POA) at the bedside talks to the Pt in Macedonian, Pt looks at the family member, does not nod. Pt admitted on 4L oxygen, Bps stable. Pt is incontinent, ER reported Pt had BM in ER today. About 10min after Pt was transferred to CTU, Pt started to vomit and had a hard time clearing secretions. Called RT for assistance. Pt was suctioned repeatedly via nose and mouth; however, Pt oxygen stats started to drop to 70s. Pt placed on 15L HF, O2 stats in low 80s. Pt had orders to receive RBC, started the infusion and Pt tolerated the ongoing infusion. However, the cont. Secretion clearing was not successful and Pt 02 stats remained btw 70-80s, pageedna CURRIE and received order to transfer to ICU. RT placed PT on NR mask 15L HF and pt 02 stats improved to 90s. Called RN in ICU and provided over the phone report. Assisted with transferring PT to ICU-family at the bedside.

## 2023-08-12 NOTE — PROGRESS NOTES
Pt transferred from [address] [place] [address] for increased O2 demand and work of breathing. Upon arrival, pt was being transfused with 1st unit of PRBCs that was previously ordered. Pt opened eyes to physical stimuli, moaning. Not moving any extremities even to pain. Not following commands. Pt was tachypneic and wheezy; HR was in Afib RVR on the monitor. It was difficult to get accurate O2 saturation. Bipap was applied. Pt was found to be hypotensive. Levophed and vasopressin was started and soon the pressors were maxed. Arterial line inserted. ABG done.

## 2023-08-12 NOTE — ED QUICK NOTES
Per ED Physician - Dr. Stephani Ortega, Pt can go up to assigned CTU bed after 1 unit of Platelet transfusion. Floor RN to start PRBC blood transfusion.

## 2023-08-13 LAB
BLOOD TYPE BARCODE: 5100
BLOOD TYPE BARCODE: 9500
Q-T INTERVAL: 366 MS
QRS DURATION: 98 MS
QTC CALCULATION (BEZET): 452 MS
R AXIS: 14 DEGREES
T AXIS: 81 DEGREES
UNIT VOLUME: 187 ML
UNIT VOLUME: 335 ML
VENTRICULAR RATE: 92 BPM

## 2023-08-13 NOTE — DISCHARGE SUMMARY
[facility] HOSPITALIST  DISCHARGE SUMMARY     Racquel Wilson Patient Status:  Inpatient    1955 MRN [id number]   [facility] 4SW-A Attending No att. providers found   Hosp Day # 1 PCP Logan Ocampo MD     Date of Admission: 2023  Date of Discharge:  2023     Discharge Disposition:     Discharge Diagnosis:  Septic shock d/t pneumonia  Acute on chronic hypoxic respiratory failure d/t aspiration pneumonia  Pancytopenia  Metastatic prostate cancer with leptomeningeal disease  Diabetes mellitus  Atrial fibrillation not on anticoagulation d/t brain mets  Mitral stenosis, mild-moderate  BPH  Dementia    History of Present Illness: Racquel Wilson is a 74year old male with past medical history significant for metastatic prostate cancer with leptomeningeal spread, atrial fibrillation not on anticoagulation due to brain mets and previous bleed, pancytopenia, type 2 diabetes, was recently admitted from -8/10 due to hypoxemic respiratory failure due to aspiration pneumonia. Oncology has recommended that patient be hospice as he is not a candidate for further systemic treatment given his poor performance status. Family have declined this. He presents today with acute onset hypoxia with O2 sats down to 70%. He was placed on oxygen and transferred to the ER. He is unable to provide additional history. There have been no reported fevers, chills, chest pain, or cough. Shortly after admission to CTU, pt developed worsening o2 sats and required suctioning with copious amount of bloody secretions suctioned. He was placed on NRB per respiratory and will be transferred to the ICU. Brief Synopsis: Patient presented with hypoxia. He was admitted for acute respiratory failure d/t aspiration pneumonia. Hospital course complicated by septic shock. Patient with continued decline. GOC discussed by [facility]. Patient  on 2023.      Lorne Lloyd MD    [employer] makes medical notes like these available to patients in the interest of transparency. Please be advised this is a medical document. Medical documents are intended to carry relevant information, facts as evident, and the clinical opinion of the practitioner. The medical note is intended as peer to peer communication and may appear blunt or direct. It is written in medical language and may contain abbreviations or verbiage that are unfamiliar.

## 2023-08-14 NOTE — CDS QUERY
Conflicting Diagnoses  [facility]    Dear Dr. Nitin Castro,    Clinical information (provided below) contains conflicting documentation. For accurate ICD-10-CM coding assignment, please clarify the acuity of the Hypoxic Respiratory Failure. (   )  Acute Hypoxic Respiratory Faillure    ( x)  Acute on Chronic Hypoxic Respiratory Faillure    (   )  Other - please specify:         Risk Factors/Clinical Indicators:     74year old male with metastatic prostate cancer, admitted from nursing home where EMT reports O2 sat was 70% and he was placed on 4L/nc. His baseline O2 is room air.       H&P: Shortly after admission to CTU, pt developed worsening o2 sats and required suctioning with copious amount of bloody secretions suctioned. He was placed on NRB per respiratory and will be transferred to the ICU. Acute hypoxic respiratory failure due to aspiration PNA      Pulmonology Consult: Acute hypoxic resp failure- due to recurrent aspiration events despite PEG placement. - cont with BIPAP support      Discharge Summary:  Septic shock d/t pneumonia  Acute on chronic hypoxic respiratory failure d/t aspiration pneumonia  Pancytopenia  Metastatic prostate cancer with leptomeningeal disease  Diabetes mellitus  Atrial fibrillation not on anticoagulation d/t brain mets  Mitral stenosis, mild-moderate  BPH  Dementia  Brief Synopsis: Patient presented with hypoxia. He was admitted for acute respiratory failure d/t aspiration pneumonia. Hospital course complicated by septic shock. Patient with continued decline. GOC discussed by [facility]. Patient  on 2023. Treatment: IVAB, IV Vasopressors, Supplemental O2. For questions regarding this query, please contact Clinical Documentation :   Abril Almonte RN  Cell# [phone]                          Thank you!

## 2023-12-11 ENCOUNTER — TELEPHONE (OUTPATIENT)
Dept: FAMILY MEDICINE CLINIC | Facility: CLINIC | Age: 68
End: 2023-12-11

## (undated) DEVICE — BIOGUARD CLEANING ADAPTER

## (undated) DEVICE — 10FT COMBINED O2 DELIVERY/CO2 MONITORING. FILTER WITH MICROSTREAM TYPE LUER: Brand: DUAL ADULT NASAL CANNULA

## (undated) DEVICE — 3M™ RED DOT™ MONITORING ELECTRODE WITH FOAM TAPE AND STICKY GEL, 50/BAG, 20/CASE, 72/PLT 2570: Brand: RED DOT™

## (undated) DEVICE — 1200CC GUARDIAN II: Brand: GUARDIAN

## (undated) DEVICE — KIT ENDO ORCAPOD 160/180/190

## (undated) DEVICE — FORCEP BIOPSY RJ4 LG CAP W/ND

## (undated) DEVICE — ENDOSCOPY PACK - LOWER: Brand: MEDLINE INDUSTRIES, INC.

## (undated) NOTE — LETTER
[facility] FOR BLOOD OR BLOOD COMPONENTS      In the course of your treatment, it may become necessary to administer a transfusion of blood or blood components. This form provides basic information concerning this procedure and, if signed by you, authorizes its performance by qualified medical personnel. DESCRIPTION OF PROCEDURE:  Blood is introduced into one of your veins, commonly in the arm, using a sterilized disposable needle. The amount of blood transfused, and whether the transfusion will be of blood or blood components is a judgment the physician will make based on your particular needs. RISKS:  The transfusion is a common procedure of low risk. MINOR AND TEMPORARY REACTIONS ARE NOT UNCOMMON, including a slight bruise, swelling or local reaction in the area where the needle pierces your skin, or a non-serious reaction to the transfused material itself, including headache, fever or a mild skin reaction, such as rash. Serious reactions are possible, though very unlikely and include severe allergic reaction (shock)  and destruction (hemolysis) of transfused blood cells. Infectious diseases which are known to be transmitted by blood transfusion include CERTAIN TYPES OF VIRAL HEPATITIS, a viral infection of the liver, HUMAN IMMUNODEFICIENCY VIRUS (HIV-1,2) infection, a viral infection known to cause ACQUIRED IMMUNODEFICIENCY SYNDROME (AIDS) AS WELL AS CERTAIN OTHER BACTERIAL, VIRAL AND PARASITIC DISEASES. While a minimal risk of acquiring an infectious disease from transfused blood exists, in accordance with Federal and State law all due care has been taken in donor selection and testing to avoid transmission of disease. ALTERNATIVES:  If loss of blood poses serious threats in the course of your treatment, THERE IS NO EFFECTIVE ALTERNATIVE TO BLOOD TRANSFUSION.  However, if you have any further questions on this matter, your physician will fully explain the alternatives to you if it has not already been done. I,Kael Bishop, have read/had read to me the above. I understand the matters bearing on the decision whether or not to authorize a transfusion of blood or blood components. I have no questions which have not been answered to my full satisfaction.  I hereby consent to such transfusion as  my physician may deem necessary or advisable in the course of my treatment.        _______________   __________________________________________________  Date     Signature of Patient, Parent or Legal Guardian      ([employer])      __________________________________________  Witness to Signature (title or relationship to patient)    Patient Name: Cardiad Ballard     : 1955                 Printed: 2023     Medical Record #: [id number]                    Page 1 of 1

## (undated) NOTE — LETTER
[facility]  Diony Huynh 63 [address]  Consent for Procedure/Sedation  Date: 8/7/23         Time: 65    I hereby authorize Dr. Kim Person, my physician and his/her assistants (if applicable), which may include medical students, residents, and/or fellows, to perform the following surgical operation/ procedure and administer such anesthesia as may be determined necessary by my physician: Gastric tube insertion on [address]  2. I recognize that during the surgical operation/procedure, unforeseen conditions may necessitate additional or different procedures than those listed above. I, therefore, further authorize and request that the above-named surgeon, assistants, or designees perform such procedures as are, in their judgment, necessary and desirable. 3.   My surgeon/physician has discussed prior to my surgery the potential benefits, risks and side effects of this procedure; the likelihood of achieving goals; and potential problems that might occur during recuperation. They also discussed reasonable alternatives to the procedure, including risks, benefits, and side effects related to the alternatives and risks related to not receiving this procedure. I have had all my questions answered and I acknowledge that no guarantee has been made as to the result that may be obtained. 4.   Should the need arise during my operation/procedure, which includes change of level of care prior to discharge, I also consent to the administration of blood and/or blood products. Further, I understand that despite careful testing and screening of blood or blood products by collecting agencies, I may still be subject to ill effects as a result of receiving a blood transfusion and/or blood products.   The following are some, but not all, of the potential risks that can occur: fever and allergic reactions, hemolytic reactions, transmission of diseases such as Hepatitis, AIDS and Cytomegalovirus (CMV) and fluid overload. In the event that I wish to have an autologous transfusion of my own blood, or a directed donor transfusion, I will discuss this with my physician. Check only if Refusing Blood or Blood Products  I understand refusal of blood or blood products as deemed necessary by my physician may have serious consequences to my condition to include possible death. I hereby assume responsibility for my refusal and release the hospital, its personnel, and my physicians from any responsibility for the consequences of my refusal.         o  Refuse         5. I authorize the use of any specimen, organs, tissues, body parts or foreign objects that may be removed from my body during the operation/procedure for diagnosis, research or teaching purposes and their subsequent disposal by hospital authorities. I also authorize the release of specimen test results and/or written reports to my treating physician on the hospital medical staff or other referring or consulting physicians involved in my care, at the discretion of the Pathologist or my treating physician. 6.   I consent to the photographing or videotaping of the operations or procedures to be performed, including appropriate portions of my body for medical, scientific, or educational purposes, provided my identity is not revealed by the pictures or by descriptive texts accompanying them. If the procedure has been photographed/videotaped, the surgeon will obtain the original picture, image, videotape or CD. The hospital will not be responsible for storage, release or maintenance of the picture, image, tape or CD.    7.   I consent to the presence of a  or observers in the operating room as deemed necessary by my physician or their designees. 8.   I recognize that in the event my procedure results in extended X-Ray/fluoroscopy time, I may develop a skin reaction. 9.  If I have a Do Not Attempt Resuscitation (DNAR) order in place, that status will be suspended while in the operating room, procedural suite, and during the recovery period unless otherwise explicitly stated by me (or a person authorized to consent on my behalf). The surgeon or my attending physician will determine when the applicable recovery period ends for purposes of reinstating the DNAR order. 10. Patients having a sterilization procedure: I understand that if the procedure is successful the results will be permanent and it will therefore be impossible for me to inseminate, conceive, or bear children. I also understand that the procedure is intended to result in sterility, although the result has not been guaranteed. 11. I acknowledge that my physician has explained sedation/analgesia administration to me including the risk and benefits I consent to the administration of sedation/analgesia as may be necessary or desirable in the judgment of my physician.     I CERTIFY THAT I HAVE READ AND FULLY UNDERSTAND THE ABOVE CONSENT TO OPERATION and/or OTHER PROCEDURE.        ____________________________________       _________________________________      ______________________________  Signature of Patient         Signature of Responsible Person        Printed Name of Responsible Person        ____________________________________      _________________________________      ______________________________       Signature of Witness          Relationship to Patient                       Date                                       Time  Patient Name: Ana Mcdaniel     : 1955                 Printed: 2023      Medical Record #: [id number]                      Page 1 of 1

## (undated) NOTE — LETTER
[address] [facility] FOR BLOOD OR BLOOD COMPONENTS      In the course of your treatment, it may become necessary to administer a transfusion of blood or blood components. This form provides basic information concerning this procedure and, if signed by you, authorizes its performance by qualified medical personnel. DESCRIPTION OF PROCEDURE:  Blood is introduced into one of your veins, commonly in the arm, using a sterilized disposable needle. The amount of blood transfused, and whether the transfusion will be of blood or blood components is a judgment the physician will make based on your particular needs. RISKS:  The transfusion is a common procedure of low risk. MINOR AND TEMPORARY REACTIONS ARE NOT UNCOMMON, including a slight bruise, swelling or local reaction in the area where the needle pierces your skin, or a non-serious reaction to the transfused material itself, including headache, fever or a mild skin reaction, such as rash. Serious reactions are possible, though very unlikely and include severe allergic reaction (shock)  and destruction (hemolysis) of transfused blood cells. Infectious diseases which are known to be transmitted by blood transfusion include CERTAIN TYPES OF VIRAL HEPATITIS, a viral infection of the liver, HUMAN IMMUNODEFICIENCY VIRUS (HIV-1,2) infection, a viral infection known to cause ACQUIRED IMMUNODEFICIENCY SYNDROME (AIDS) AS WELL AS CERTAIN OTHER BACTERIAL, VIRAL AND PARASITIC DISEASES. While a minimal risk of acquiring an infectious disease from transfused blood exists, in accordance with Federal and State law all due care has been taken in donor selection and testing to avoid transmission of disease. ALTERNATIVES:  If loss of blood poses serious threats in the course of your treatment, THERE IS NO EFFECTIVE ALTERNATIVE TO BLOOD TRANSFUSION.  However, if you have any further questions on this matter, your physician will fully explain the alternatives to you if it has not already been done. I,Kael Bishop, have read/had read to me the above. I understand the matters bearing on the decision whether or not to authorize a transfusion of blood or blood components. I have no questions which have not been answered to my full satisfaction.  I hereby consent to such transfusion as  my physician may deem necessary or advisable in the course of my treatment.        _______________   __________________________________________________  Date     Signature of Patient, Parent or Legal Guardian      ([employer])      __________________________________________  Witness to Signature (title or relationship to patient)    Patient Name: Jerome Bennett     : 1955                 Printed: 2023     Medical Record #: [id number]                    Page 1 of 1

## (undated) NOTE — LETTER
[address] [facility] FOR BLOOD OR BLOOD COMPONENTS      In the course of your treatment, it may become necessary to administer a transfusion of blood or blood components. This form provides basic information concerning this procedure and, if signed by you, authorizes its performance by qualified medical personnel. DESCRIPTION OF PROCEDURE:  Blood is introduced into one of your veins, commonly in the arm, using a sterilized disposable needle. The amount of blood transfused, and whether the transfusion will be of blood or blood components is a judgment the physician will make based on your particular needs. RISKS:  The transfusion is a common procedure of low risk. MINOR AND TEMPORARY REACTIONS ARE NOT UNCOMMON, including a slight bruise, swelling or local reaction in the area where the needle pierces your skin, or a non-serious reaction to the transfused material itself, including headache, fever or a mild skin reaction, such as rash. Serious reactions are possible, though very unlikely and include severe allergic reaction (shock)  and destruction (hemolysis) of transfused blood cells. Infectious diseases which are known to be transmitted by blood transfusion include CERTAIN TYPES OF VIRAL HEPATITIS, a viral infection of the liver, HUMAN IMMUNODEFICIENCY VIRUS (HIV-1,2) infection, a viral infection known to cause ACQUIRED IMMUNODEFICIENCY SYNDROME (AIDS) AS WELL AS CERTAIN OTHER BACTERIAL, VIRAL AND PARASITIC DISEASES. While a minimal risk of acquiring an infectious disease from transfused blood exists, in accordance with Federal and State law all due care has been taken in donor selection and testing to avoid transmission of disease. ALTERNATIVES:  If loss of blood poses serious threats in the course of your treatment, THERE IS NO EFFECTIVE ALTERNATIVE TO BLOOD TRANSFUSION.  However, if you have any further questions on this matter, your physician will fully explain the alternatives to you if it has not already been done. I,Kael Bishop, have read/had read to me the above. I understand the matters bearing on the decision whether or not to authorize a transfusion of blood or blood components. I have no questions which have not been answered to my full satisfaction.  I hereby consent to such transfusion as  my physician may deem necessary or advisable in the course of my treatment.        _______________   __________________________________________________  Date     Signature of Patient, Parent or Legal Guardian      ([employer])      __________________________________________  Witness to Signature (title or relationship to patient)    Patient Name: Safia Verdugo     : 1955                 Printed: 2023     Medical Record #: [id number]                    Page 1 of 1

## (undated) NOTE — LETTER
[address] [facility] FOR BLOOD OR BLOOD COMPONENTS      In the course of your treatment, it may become necessary to administer a transfusion of blood or blood components. This form provides basic information concerning this procedure and, if signed by you, authorizes its performance by qualified medical personnel. DESCRIPTION OF PROCEDURE:  Blood is introduced into one of your veins, commonly in the arm, using a sterilized disposable needle. The amount of blood transfused, and whether the transfusion will be of blood or blood components is a judgment the physician will make based on your particular needs. RISKS:  The transfusion is a common procedure of low risk. MINOR AND TEMPORARY REACTIONS ARE NOT UNCOMMON, including a slight bruise, swelling or local reaction in the area where the needle pierces your skin, or a non-serious reaction to the transfused material itself, including headache, fever or a mild skin reaction, such as rash. Serious reactions are possible, though very unlikely and include severe allergic reaction (shock)  and destruction (hemolysis) of transfused blood cells. Infectious diseases which are known to be transmitted by blood transfusion include CERTAIN TYPES OF VIRAL HEPATITIS, a viral infection of the liver, HUMAN IMMUNODEFICIENCY VIRUS (HIV-1,2) infection, a viral infection known to cause ACQUIRED IMMUNODEFICIENCY SYNDROME (AIDS) AS WELL AS CERTAIN OTHER BACTERIAL, VIRAL AND PARASITIC DISEASES. While a minimal risk of acquiring an infectious disease from transfused blood exists, in accordance with Federal and State law all due care has been taken in donor selection and testing to avoid transmission of disease. ALTERNATIVES:  If loss of blood poses serious threats in the course of your treatment, THERE IS NO EFFECTIVE ALTERNATIVE TO BLOOD TRANSFUSION.  However, if you have any further questions on this matter, your physician will fully explain the alternatives to you if it has not already been done. I,Kael Bishop, have read/had read to me the above. I understand the matters bearing on the decision whether or not to authorize a transfusion of blood or blood components. I have no questions which have not been answered to my full satisfaction.  I hereby consent to such transfusion as  my physician may deem necessary or advisable in the course of my treatment.        _______________   __________________________________________________  Date     Signature of Patient, Parent or Legal Guardian      ([place] [employer])      __________________________________________  Witness to Signature (title or relationship to patient)    Patient Name: Nilam Rivas     : 1955                 Printed: 2023     Medical Record #: [id number]                    Page 1 of 1